# Patient Record
Sex: FEMALE | Race: WHITE | NOT HISPANIC OR LATINO | Employment: OTHER | URBAN - METROPOLITAN AREA
[De-identification: names, ages, dates, MRNs, and addresses within clinical notes are randomized per-mention and may not be internally consistent; named-entity substitution may affect disease eponyms.]

---

## 2023-03-01 PROBLEM — H43.399 VITREOUS FLOATERS: Status: ACTIVE | Noted: 2023-03-01

## 2023-03-01 PROBLEM — M19.049 ARTHRITIS OF HAND: Status: ACTIVE | Noted: 2023-03-01

## 2023-03-01 PROBLEM — I48.92 PAROXYSMAL ATRIAL FLUTTER (MULTI): Status: ACTIVE | Noted: 2023-03-01

## 2023-03-01 PROBLEM — G40.909 EPILEPSY, UNSPECIFIED, NOT INTRACTABLE, WITHOUT STATUS EPILEPTICUS (MULTI): Status: ACTIVE | Noted: 2023-03-01

## 2023-03-01 PROBLEM — M19.90 OSTEOARTHRITIS: Status: ACTIVE | Noted: 2023-03-01

## 2023-03-01 PROBLEM — Z97.3 WEARS EYEGLASSES: Status: ACTIVE | Noted: 2023-03-01

## 2023-03-01 PROBLEM — E78.5 HYPERLIPIDEMIA: Status: ACTIVE | Noted: 2023-03-01

## 2023-03-01 PROBLEM — H52.203 ASTIGMATISM OF BOTH EYES: Status: ACTIVE | Noted: 2023-03-01

## 2023-03-01 PROBLEM — E06.3 HASHIMOTO'S THYROIDITIS: Status: ACTIVE | Noted: 2023-03-01

## 2023-03-01 PROBLEM — H40.1190 POAG (PRIMARY OPEN-ANGLE GLAUCOMA): Status: ACTIVE | Noted: 2023-03-01

## 2023-03-01 PROBLEM — H26.499 PCO (POSTERIOR CAPSULAR OPACIFICATION): Status: ACTIVE | Noted: 2023-03-01

## 2023-03-01 PROBLEM — Z96.1 PRESENCE OF ARTIFICIAL INTRA-OCULAR LENS: Status: ACTIVE | Noted: 2023-03-01

## 2023-03-01 PROBLEM — G47.33 OBSTRUCTIVE SLEEP APNEA OF ADULT: Status: ACTIVE | Noted: 2023-03-01

## 2023-03-01 PROBLEM — G40.409: Status: ACTIVE | Noted: 2023-03-01

## 2023-03-01 PROBLEM — H04.123 DRY EYE SYNDROME OF BOTH LACRIMAL GLANDS: Status: ACTIVE | Noted: 2023-03-01

## 2023-03-01 PROBLEM — I10 HYPERTENSION: Status: ACTIVE | Noted: 2023-03-01

## 2023-03-01 PROBLEM — M81.0 OSTEOPOROSIS: Status: ACTIVE | Noted: 2023-03-01

## 2023-03-01 PROBLEM — F32.0 DEPRESSION, MAJOR, SINGLE EPISODE, MILD (CMS-HCC): Status: ACTIVE | Noted: 2023-03-01

## 2023-03-01 PROBLEM — N39.41 URGE INCONTINENCE OF URINE: Status: ACTIVE | Noted: 2023-03-01

## 2023-03-01 PROBLEM — N32.81 OVERACTIVE BLADDER: Status: ACTIVE | Noted: 2023-03-01

## 2023-03-01 PROBLEM — M10.9 GOUT: Status: ACTIVE | Noted: 2023-03-01

## 2023-03-01 PROBLEM — E03.9 HYPOTHYROIDISM: Status: ACTIVE | Noted: 2023-03-01

## 2023-03-01 RX ORDER — OXYBUTYNIN CHLORIDE 5 MG/1
5 TABLET ORAL DAILY
COMMUNITY
Start: 2022-10-06 | End: 2023-10-04 | Stop reason: SDUPTHER

## 2023-03-01 RX ORDER — ELECTROLYTES/DEXTROSE
SOLUTION, ORAL ORAL
COMMUNITY
End: 2024-03-07 | Stop reason: HOSPADM

## 2023-03-01 RX ORDER — ZONISAMIDE 100 MG/1
CAPSULE ORAL
COMMUNITY
Start: 2016-09-14

## 2023-03-01 RX ORDER — TRAZODONE HYDROCHLORIDE 50 MG/1
TABLET ORAL
COMMUNITY
Start: 2020-08-03 | End: 2023-11-08 | Stop reason: SDUPTHER

## 2023-03-01 RX ORDER — ACETAMINOPHEN, DIPHENHYDRAMINE HCL, PHENYLEPHRINE HCL 325; 25; 5 MG/1; MG/1; MG/1
1 TABLET ORAL NIGHTLY
COMMUNITY
Start: 2018-12-05 | End: 2024-02-19 | Stop reason: ALTCHOICE

## 2023-03-01 RX ORDER — AMLODIPINE BESYLATE 5 MG/1
TABLET ORAL
COMMUNITY
Start: 2013-03-11 | End: 2023-08-01 | Stop reason: SDUPTHER

## 2023-03-01 RX ORDER — ESCITALOPRAM OXALATE 5 MG/1
TABLET ORAL
COMMUNITY
Start: 2020-11-30 | End: 2023-11-08

## 2023-03-01 RX ORDER — ACETAMINOPHEN 500 MG
TABLET ORAL
COMMUNITY
End: 2024-03-07 | Stop reason: HOSPADM

## 2023-03-01 RX ORDER — ASPIRIN 325 MG
TABLET, DELAYED RELEASE (ENTERIC COATED) ORAL
COMMUNITY
Start: 2018-10-08 | End: 2024-03-07 | Stop reason: HOSPADM

## 2023-03-01 RX ORDER — LEVOTHYROXINE SODIUM 112 UG/1
TABLET ORAL
COMMUNITY
Start: 2016-06-01 | End: 2023-08-01 | Stop reason: SDUPTHER

## 2023-03-01 RX ORDER — TRIAMCINOLONE ACETONIDE 1 MG/G
OINTMENT TOPICAL
COMMUNITY
End: 2024-03-07 | Stop reason: HOSPADM

## 2023-03-01 RX ORDER — ASCORBIC ACID 500 MG
TABLET ORAL
COMMUNITY
Start: 2017-11-08 | End: 2024-01-25 | Stop reason: SDUPTHER

## 2023-03-01 RX ORDER — LANOLIN ALCOHOL/MO/W.PET/CERES
CREAM (GRAM) TOPICAL
COMMUNITY
Start: 2016-04-26 | End: 2024-03-07 | Stop reason: HOSPADM

## 2023-03-01 RX ORDER — LAMOTRIGINE 100 MG/1
TABLET ORAL
COMMUNITY
Start: 2022-10-12 | End: 2024-01-29 | Stop reason: SDUPTHER

## 2023-03-01 RX ORDER — LANOLIN ALCOHOL/MO/W.PET/CERES
CREAM (GRAM) TOPICAL
COMMUNITY
End: 2024-02-19 | Stop reason: ALTCHOICE

## 2023-03-01 RX ORDER — CHOLECALCIFEROL (VITAMIN D3) 125 MCG
CAPSULE ORAL
COMMUNITY
Start: 2021-08-13 | End: 2023-09-27 | Stop reason: WASHOUT

## 2023-03-01 RX ORDER — DESOXIMETASONE 0.5 MG/G
GEL TOPICAL
COMMUNITY
Start: 2012-12-29 | End: 2024-02-19 | Stop reason: ALTCHOICE

## 2023-04-25 RX ORDER — METOPROLOL SUCCINATE 25 MG/1
TABLET, EXTENDED RELEASE ORAL
COMMUNITY
End: 2023-04-26 | Stop reason: SDUPTHER

## 2023-04-26 ENCOUNTER — TELEPHONE (OUTPATIENT)
Dept: PRIMARY CARE | Facility: CLINIC | Age: 83
End: 2023-04-26
Payer: MEDICARE

## 2023-04-26 DIAGNOSIS — I10 HYPERTENSION, UNSPECIFIED TYPE: Primary | ICD-10-CM

## 2023-04-26 RX ORDER — METOPROLOL SUCCINATE 25 MG/1
25 TABLET, EXTENDED RELEASE ORAL DAILY
Qty: 30 TABLET | Refills: 3 | Status: SHIPPED | OUTPATIENT
Start: 2023-04-26 | End: 2023-05-08 | Stop reason: SDUPTHER

## 2023-05-08 ENCOUNTER — OFFICE VISIT (OUTPATIENT)
Dept: PRIMARY CARE | Facility: CLINIC | Age: 83
End: 2023-05-08
Payer: MEDICARE

## 2023-05-08 ENCOUNTER — LAB (OUTPATIENT)
Dept: LAB | Facility: LAB | Age: 83
End: 2023-05-08
Payer: MEDICARE

## 2023-05-08 VITALS
WEIGHT: 164 LBS | HEART RATE: 62 BPM | OXYGEN SATURATION: 96 % | BODY MASS INDEX: 28 KG/M2 | HEIGHT: 64 IN | SYSTOLIC BLOOD PRESSURE: 127 MMHG | DIASTOLIC BLOOD PRESSURE: 68 MMHG

## 2023-05-08 DIAGNOSIS — E11.9 NEWLY DIAGNOSED DIABETES (MULTI): ICD-10-CM

## 2023-05-08 DIAGNOSIS — I10 HYPERTENSION, UNSPECIFIED TYPE: Primary | ICD-10-CM

## 2023-05-08 DIAGNOSIS — E03.9 HYPOTHYROIDISM, UNSPECIFIED TYPE: ICD-10-CM

## 2023-05-08 DIAGNOSIS — E78.5 HYPERLIPIDEMIA, UNSPECIFIED HYPERLIPIDEMIA TYPE: ICD-10-CM

## 2023-05-08 DIAGNOSIS — S00.469A TICK BITE OF EAR, UNSPECIFIED LATERALITY, INITIAL ENCOUNTER: ICD-10-CM

## 2023-05-08 DIAGNOSIS — F32.0 DEPRESSION, MAJOR, SINGLE EPISODE, MILD (CMS-HCC): ICD-10-CM

## 2023-05-08 DIAGNOSIS — G47.33 OBSTRUCTIVE SLEEP APNEA OF ADULT: ICD-10-CM

## 2023-05-08 DIAGNOSIS — I10 HYPERTENSION, UNSPECIFIED TYPE: ICD-10-CM

## 2023-05-08 DIAGNOSIS — M19.90 OSTEOARTHRITIS, UNSPECIFIED OSTEOARTHRITIS TYPE, UNSPECIFIED SITE: ICD-10-CM

## 2023-05-08 DIAGNOSIS — M10.9 GOUT, UNSPECIFIED CAUSE, UNSPECIFIED CHRONICITY, UNSPECIFIED SITE: ICD-10-CM

## 2023-05-08 DIAGNOSIS — K59.00 CONSTIPATION, UNSPECIFIED CONSTIPATION TYPE: ICD-10-CM

## 2023-05-08 DIAGNOSIS — G40.909 NONINTRACTABLE EPILEPSY WITHOUT STATUS EPILEPTICUS, UNSPECIFIED EPILEPSY TYPE (MULTI): ICD-10-CM

## 2023-05-08 DIAGNOSIS — Z00.00 HEALTHCARE MAINTENANCE: ICD-10-CM
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
## 2023-05-08 DIAGNOSIS — Z13.6 SCREENING FOR CARDIOVASCULAR CONDITION: ICD-10-CM

## 2023-05-08 DIAGNOSIS — M81.0 OSTEOPOROSIS, UNSPECIFIED OSTEOPOROSIS TYPE, UNSPECIFIED PATHOLOGICAL FRACTURE PRESENCE: ICD-10-CM

## 2023-05-08 DIAGNOSIS — Z13.21 ENCOUNTER FOR VITAMIN DEFICIENCY SCREENING: ICD-10-CM

## 2023-05-08 DIAGNOSIS — R73.03 PREDIABETES: ICD-10-CM

## 2023-05-08 PROBLEM — G47.21 CIRCADIAN RHYTHM SLEEP DISORDER, DELAYED SLEEP PHASE TYPE: Status: ACTIVE | Noted: 2023-05-08

## 2023-05-08 PROBLEM — Z90.12 HISTORY OF LEFT MASTECTOMY: Status: ACTIVE | Noted: 2023-05-08

## 2023-05-08 PROBLEM — F41.9 CHRONIC ANXIETY: Status: ACTIVE | Noted: 2023-05-08

## 2023-05-08 LAB
ALANINE AMINOTRANSFERASE (SGPT) (U/L) IN SER/PLAS: 15 U/L (ref 7–45)
ALBUMIN (G/DL) IN SER/PLAS: 4.5 G/DL (ref 3.4–5)
ALKALINE PHOSPHATASE (U/L) IN SER/PLAS: 83 U/L (ref 33–136)
ANION GAP IN SER/PLAS: 15 MMOL/L (ref 10–20)
ASPARTATE AMINOTRANSFERASE (SGOT) (U/L) IN SER/PLAS: 16 U/L (ref 9–39)
BILIRUBIN TOTAL (MG/DL) IN SER/PLAS: 0.6 MG/DL (ref 0–1.2)
CALCIUM (MG/DL) IN SER/PLAS: 10.3 MG/DL (ref 8.6–10.6)
CARBON DIOXIDE, TOTAL (MMOL/L) IN SER/PLAS: 28 MMOL/L (ref 21–32)
CHLORIDE (MMOL/L) IN SER/PLAS: 106 MMOL/L (ref 98–107)
CHOLESTEROL (MG/DL) IN SER/PLAS: 170 MG/DL (ref 0–199)
CHOLESTEROL IN HDL (MG/DL) IN SER/PLAS: 65.9 MG/DL
CHOLESTEROL/HDL RATIO: 2.6
CREATININE (MG/DL) IN SER/PLAS: 0.77 MG/DL (ref 0.5–1.05)
ERYTHROCYTE DISTRIBUTION WIDTH (RATIO) BY AUTOMATED COUNT: 14.6 % (ref 11.5–14.5)
ERYTHROCYTE MEAN CORPUSCULAR HEMOGLOBIN CONCENTRATION (G/DL) BY AUTOMATED: 31.4 G/DL (ref 32–36)
ERYTHROCYTE MEAN CORPUSCULAR VOLUME (FL) BY AUTOMATED COUNT: 102 FL (ref 80–100)
ERYTHROCYTES (10*6/UL) IN BLOOD BY AUTOMATED COUNT: 4.61 X10E12/L (ref 4–5.2)
ESTIMATED AVERAGE GLUCOSE FOR HBA1C: 123 MG/DL
GFR FEMALE: 77 ML/MIN/1.73M2
GLUCOSE (MG/DL) IN SER/PLAS: 117 MG/DL (ref 74–99)
HEMATOCRIT (%) IN BLOOD BY AUTOMATED COUNT: 46.8 % (ref 36–46)
HEMOGLOBIN (G/DL) IN BLOOD: 14.7 G/DL (ref 12–16)
HEMOGLOBIN A1C/HEMOGLOBIN TOTAL IN BLOOD: 5.9 %
LDL: 89 MG/DL (ref 0–99)
LEUKOCYTES (10*3/UL) IN BLOOD BY AUTOMATED COUNT: 8.9 X10E9/L (ref 4.4–11.3)
NRBC (PER 100 WBCS) BY AUTOMATED COUNT: 0 /100 WBC (ref 0–0)
PLATELETS (10*3/UL) IN BLOOD AUTOMATED COUNT: 544 X10E9/L (ref 150–450)
POTASSIUM (MMOL/L) IN SER/PLAS: 4.7 MMOL/L (ref 3.5–5.3)
PROTEIN TOTAL: 6.9 G/DL (ref 6.4–8.2)
SODIUM (MMOL/L) IN SER/PLAS: 144 MMOL/L (ref 136–145)
THYROTROPIN (MIU/L) IN SER/PLAS BY DETECTION LIMIT <= 0.05 MIU/L: 1.32 MIU/L (ref 0.44–3.98)
TRIGLYCERIDE (MG/DL) IN SER/PLAS: 78 MG/DL (ref 0–149)
URATE (MG/DL) IN SER/PLAS: 4.5 MG/DL (ref 2.3–6.7)
UREA NITROGEN (MG/DL) IN SER/PLAS: 23 MG/DL (ref 6–23)
VLDL: 16 MG/DL (ref 0–40)

## 2023-05-08 PROCEDURE — 80061 LIPID PANEL: CPT

## 2023-05-08 PROCEDURE — 1126F AMNT PAIN NOTED NONE PRSNT: CPT | Performed by: INTERNAL MEDICINE

## 2023-05-08 PROCEDURE — 36415 COLL VENOUS BLD VENIPUNCTURE: CPT

## 2023-05-08 PROCEDURE — 83036 HEMOGLOBIN GLYCOSYLATED A1C: CPT

## 2023-05-08 PROCEDURE — 85027 COMPLETE CBC AUTOMATED: CPT

## 2023-05-08 PROCEDURE — 3074F SYST BP LT 130 MM HG: CPT | Performed by: INTERNAL MEDICINE

## 2023-05-08 PROCEDURE — 1159F MED LIST DOCD IN RCRD: CPT | Performed by: INTERNAL MEDICINE

## 2023-05-08 PROCEDURE — 99417 PROLNG OP E/M EACH 15 MIN: CPT | Performed by: INTERNAL MEDICINE

## 2023-05-08 PROCEDURE — 1160F RVW MEDS BY RX/DR IN RCRD: CPT | Performed by: INTERNAL MEDICINE

## 2023-05-08 PROCEDURE — 99215 OFFICE O/P EST HI 40 MIN: CPT | Performed by: INTERNAL MEDICINE

## 2023-05-08 PROCEDURE — 82652 VIT D 1 25-DIHYDROXY: CPT

## 2023-05-08 PROCEDURE — 84550 ASSAY OF BLOOD/URIC ACID: CPT

## 2023-05-08 PROCEDURE — 84443 ASSAY THYROID STIM HORMONE: CPT

## 2023-05-08 PROCEDURE — 3078F DIAST BP <80 MM HG: CPT | Performed by: INTERNAL MEDICINE

## 2023-05-08 PROCEDURE — 80053 COMPREHEN METABOLIC PANEL: CPT

## 2023-05-08 RX ORDER — DENOSUMAB 60 MG/ML
60 INJECTION SUBCUTANEOUS ONCE
Qty: 1 ML | Refills: 0
Start: 2023-05-08 | End: 2024-03-22

## 2023-05-08 RX ORDER — DOXYCYCLINE 100 MG/1
CAPSULE ORAL
Qty: 20 CAPSULE | Refills: 0 | Status: SHIPPED | OUTPATIENT
Start: 2023-05-08 | End: 2023-11-07 | Stop reason: ALTCHOICE

## 2023-05-08 RX ORDER — METOPROLOL SUCCINATE 25 MG/1
25 TABLET, EXTENDED RELEASE ORAL DAILY
Qty: 90 TABLET | Refills: 1 | Status: SHIPPED | OUTPATIENT
Start: 2023-05-08 | End: 2024-01-06

## 2023-05-08 ASSESSMENT — ENCOUNTER SYMPTOMS
EYES NEGATIVE: 1
GASTROINTESTINAL NEGATIVE: 1
NEUROLOGICAL NEGATIVE: 1
PSYCHIATRIC NEGATIVE: 1
CARDIOVASCULAR NEGATIVE: 1
MUSCULOSKELETAL NEGATIVE: 1
DEPRESSION: 0
HEMATOLOGIC/LYMPHATIC NEGATIVE: 1
ENDOCRINE NEGATIVE: 1
RESPIRATORY NEGATIVE: 1
CONSTITUTIONAL NEGATIVE: 1

## 2023-05-08 ASSESSMENT — PATIENT HEALTH QUESTIONNAIRE - PHQ9
SUM OF ALL RESPONSES TO PHQ9 QUESTIONS 1 AND 2: 0
2. FEELING DOWN, DEPRESSED OR HOPELESS: NOT AT ALL
1. LITTLE INTEREST OR PLEASURE IN DOING THINGS: NOT AT ALL

## 2023-05-08 NOTE — PROGRESS NOTES
Subjective   Patient ID: Flower Lucas is a 82 y.o. female who presents for Follow-up (Pt here for fuv, pt complains of tick bite on left ear x 1 week and constipation issues ).  HPI    Co  constipation.    Takes magnesium  and calcium.   No stool softener.     Recent tick bite  ear.  5  d   thought it  was a scab.  Now  feels something a little  tender.           Refill meds.   Metorpolol  25 mg.       Get lab.       Review of Systems   Constitutional: Negative.    HENT: Negative.     Eyes: Negative.    Respiratory: Negative.     Cardiovascular: Negative.    Gastrointestinal: Negative.    Endocrine: Negative.    Musculoskeletal: Negative.    Skin: Negative.    Neurological: Negative.    Hematological: Negative.    Psychiatric/Behavioral: Negative.     All other systems reviewed and are negative.      Objective   Physical Exam  Vitals and nursing note reviewed.   Constitutional:       Appearance: Normal appearance.   HENT:      Head: Normocephalic and atraumatic.      Right Ear: Tympanic membrane, ear canal and external ear normal.      Left Ear: Tympanic membrane, ear canal and external ear normal.      Nose: Nose normal.      Mouth/Throat:      Mouth: Mucous membranes are moist.      Pharynx: Oropharynx is clear.   Eyes:      Extraocular Movements: Extraocular movements intact.      Conjunctiva/sclera: Conjunctivae normal.      Pupils: Pupils are equal, round, and reactive to light.   Cardiovascular:      Rate and Rhythm: Normal rate and regular rhythm.      Pulses: Normal pulses.      Heart sounds: Normal heart sounds.   Pulmonary:      Effort: Pulmonary effort is normal.      Breath sounds: Normal breath sounds.   Abdominal:      General: Abdomen is flat. Bowel sounds are normal.      Palpations: Abdomen is soft.   Musculoskeletal:         General: Normal range of motion.      Cervical back: Neck supple.   Skin:     General: Skin is warm and dry.      Capillary Refill: Capillary refill takes 2 to 3 seconds.    Neurological:      General: No focal deficit present.      Mental Status: She is alert and oriented to person, place, and time. Mental status is at baseline.   Psychiatric:         Mood and Affect: Mood normal.         Behavior: Behavior normal.         Thought Content: Thought content normal.         Judgment: Judgment normal.         Assessment/Plan   Problem List Items Addressed This Visit          Medium    Depression, major, single episode, mild (CMS/HCC)    Epilepsy, unspecified, not intractable, without status epilepticus (CMS/HCC)    Gout    Relevant Orders    Uric Acid (Completed)    Hyperlipidemia    Relevant Orders    Comprehensive Metabolic Panel (Completed)    Lipid Panel (Completed)    CBC (Completed)    Vitamin D 1,25 Dihydroxy (Completed)    TSH with reflex to Free T4 if abnormal (Completed)    Uric Acid (Completed)    Hemoglobin A1c (Completed)    Hypertension - Primary    Relevant Medications    metoprolol succinate XL (Toprol-XL) 25 mg 24 hr tablet    Other Relevant Orders    Comprehensive Metabolic Panel (Completed)    CBC (Completed)    TSH with reflex to Free T4 if abnormal (Completed)    Hypothyroidism    Relevant Orders    TSH with reflex to Free T4 if abnormal (Completed)    Newly diagnosed diabetes (CMS/[facility])    Obstructive sleep apnea of adult    Osteoarthritis    Osteoporosis    Relevant Orders    Comprehensive Metabolic Panel (Completed)    CBC (Completed)    Vitamin D 1,25 Dihydroxy (Completed)    TSH with reflex to Free T4 if abnormal (Completed)    Prediabetes    Relevant Orders    Hemoglobin A1c (Completed)    Tick bite of ear    Relevant Medications    doxycycline (Vibramycin) 100 mg capsule    Other Relevant Orders    Comprehensive Metabolic Panel (Completed)    TSH with reflex to Free T4 if abnormal (Completed)     Other Visit Diagnoses       Constipation, unspecified constipation type        Healthcare maintenance        Relevant Orders    Comprehensive Metabolic Panel  (Completed)    Lipid Panel (Completed)    CBC (Completed)    Vitamin D 1,25 Dihydroxy (Completed)    TSH with reflex to Free T4 if abnormal (Completed)    Uric Acid (Completed)    Hemoglobin A1c (Completed)    Screening for cardiovascular condition        Relevant Orders    Lipid Panel (Completed)    Encounter for vitamin deficiency screening        Relevant Orders    Vitamin D 1,25 Dihydroxy (Completed)          CHART  REVIEWED AND  RECONCILED WITH OLD DATA / UPDATED IN NEW SYSTEM:  MEDS,  PROBLEMS,  ALLERGIES, SOC HISTORY.  Chronic problems controlled  on current treatment  unless otherwise noted.

## 2023-05-10 ENCOUNTER — TELEPHONE (OUTPATIENT)
Dept: PRIMARY CARE | Facility: CLINIC | Age: 83
End: 2023-05-10
Payer: MEDICARE

## 2023-05-10 LAB — VITAMIN D 1,25-DIHYDROXY: 47.1 PG/ML (ref 19.9–79.3)

## 2023-05-10 NOTE — TELEPHONE ENCOUNTER
----- Message from Bhavna Costa MD sent at 5/10/2023  1:16 PM EDT -----  Please call the patient regarding her abnormal result.

## 2023-06-14 DIAGNOSIS — I10 HYPERTENSION, UNSPECIFIED TYPE: Primary | ICD-10-CM

## 2023-06-14 RX ORDER — HYDROCHLOROTHIAZIDE 12.5 MG/1
12.5 TABLET ORAL DAILY
Qty: 90 TABLET | Refills: 1 | Status: SHIPPED | OUTPATIENT
Start: 2023-06-14 | End: 2023-12-12 | Stop reason: SDUPTHER

## 2023-06-14 RX ORDER — HYDROCHLOROTHIAZIDE 12.5 MG/1
12.5 TABLET ORAL DAILY
COMMUNITY
End: 2023-06-14 | Stop reason: SDUPTHER

## 2023-07-19 ENCOUNTER — HOSPITAL ENCOUNTER (OUTPATIENT)
Dept: DATA CONVERSION | Facility: HOSPITAL | Age: 83
End: 2023-07-20
Attending: ORTHOPAEDIC SURGERY | Admitting: ORTHOPAEDIC SURGERY
Payer: MEDICARE

## 2023-07-19 DIAGNOSIS — I10 ESSENTIAL (PRIMARY) HYPERTENSION: ICD-10-CM

## 2023-07-19 DIAGNOSIS — E78.5 HYPERLIPIDEMIA, UNSPECIFIED: ICD-10-CM

## 2023-07-19 DIAGNOSIS — F41.9 ANXIETY DISORDER, UNSPECIFIED: ICD-10-CM

## 2023-07-19 DIAGNOSIS — E03.9 HYPOTHYROIDISM, UNSPECIFIED: ICD-10-CM

## 2023-07-19 DIAGNOSIS — M25.752 OSTEOPHYTE, LEFT HIP: ICD-10-CM

## 2023-07-19 DIAGNOSIS — M65.88 OTHER SYNOVITIS AND TENOSYNOVITIS, OTHER SITE: ICD-10-CM

## 2023-07-19 DIAGNOSIS — H40.9 UNSPECIFIED GLAUCOMA: ICD-10-CM

## 2023-07-19 DIAGNOSIS — F32.A DEPRESSION, UNSPECIFIED: ICD-10-CM

## 2023-07-19 DIAGNOSIS — G40.919 EPILEPSY, UNSPECIFIED, INTRACTABLE, WITHOUT STATUS EPILEPTICUS (MULTI): ICD-10-CM

## 2023-07-19 DIAGNOSIS — M85.88 OTHER SPECIFIED DISORDERS OF BONE DENSITY AND STRUCTURE, OTHER SITE: ICD-10-CM

## 2023-07-19 DIAGNOSIS — Z87.891 PERSONAL HISTORY OF NICOTINE DEPENDENCE: ICD-10-CM

## 2023-07-19 DIAGNOSIS — M16.12 UNILATERAL PRIMARY OSTEOARTHRITIS, LEFT HIP: ICD-10-CM

## 2023-07-19 LAB
ALANINE AMINOTRANSFERASE (SGPT) (U/L) IN SER/PLAS: 23 U/L (ref 7–45)
ALBUMIN (G/DL) IN SER/PLAS: 3.9 G/DL (ref 3.4–5)
ALKALINE PHOSPHATASE (U/L) IN SER/PLAS: 63 U/L (ref 33–136)
ANION GAP IN SER/PLAS: 12 MMOL/L (ref 10–20)
ASPARTATE AMINOTRANSFERASE (SGOT) (U/L) IN SER/PLAS: 28 U/L (ref 9–39)
BILIRUBIN TOTAL (MG/DL) IN SER/PLAS: 0.4 MG/DL (ref 0–1.2)
CALCIUM (MG/DL) IN SER/PLAS: 8.4 MG/DL (ref 8.6–10.3)
CARBON DIOXIDE, TOTAL (MMOL/L) IN SER/PLAS: 26 MMOL/L (ref 21–32)
CHLORIDE (MMOL/L) IN SER/PLAS: 105 MMOL/L (ref 98–107)
CREATININE (MG/DL) IN SER/PLAS: 0.78 MG/DL (ref 0.5–1.05)
ERYTHROCYTE DISTRIBUTION WIDTH (RATIO) BY AUTOMATED COUNT: 14.8 % (ref 11.5–14.5)
ERYTHROCYTE MEAN CORPUSCULAR HEMOGLOBIN CONCENTRATION (G/DL) BY AUTOMATED: 31.5 G/DL (ref 32–36)
ERYTHROCYTE MEAN CORPUSCULAR VOLUME (FL) BY AUTOMATED COUNT: 102 FL (ref 80–100)
ERYTHROCYTES (10*6/UL) IN BLOOD BY AUTOMATED COUNT: 3.69 X10E12/L (ref 4–5.2)
GFR FEMALE: 75 ML/MIN/1.73M2
GLUCOSE (MG/DL) IN SER/PLAS: 141 MG/DL (ref 74–99)
HEMATOCRIT (%) IN BLOOD BY AUTOMATED COUNT: 37.8 % (ref 36–46)
HEMOGLOBIN (G/DL) IN BLOOD: 11.9 G/DL (ref 12–16)
LACTATE (MMOL/L) IN SER/PLAS: 1.7 MMOL/L (ref 0.4–2)
LAMOTRIGINE LEVEL: NORMAL
LEUKOCYTES (10*3/UL) IN BLOOD BY AUTOMATED COUNT: 15.1 X10E9/L (ref 4.4–11.3)
MAGNESIUM (MG/DL) IN SER/PLAS: 1.85 MG/DL (ref 1.6–2.4)
PHOSPHATE (MG/DL) IN SER/PLAS: 3.3 MG/DL (ref 2.5–4.9)
PLATELETS (10*3/UL) IN BLOOD AUTOMATED COUNT: 367 X10E9/L (ref 150–450)
POCT GLUCOSE: 148 MG/DL (ref 74–99)
POTASSIUM (MMOL/L) IN SER/PLAS: 3.9 MMOL/L (ref 3.5–5.3)
PROTEIN TOTAL: 5.8 G/DL (ref 6.4–8.2)
SODIUM (MMOL/L) IN SER/PLAS: 139 MMOL/L (ref 136–145)
UREA NITROGEN (MG/DL) IN SER/PLAS: 22 MG/DL (ref 6–23)
ZONISAMIDE: NORMAL

## 2023-07-20 LAB
ANION GAP IN SER/PLAS: 12 MMOL/L (ref 10–20)
BASOPHILS (10*3/UL) IN BLOOD BY AUTOMATED COUNT: 0.03 X10E9/L (ref 0–0.1)
BASOPHILS/100 LEUKOCYTES IN BLOOD BY AUTOMATED COUNT: 0.3 % (ref 0–2)
CALCIUM (MG/DL) IN SER/PLAS: 8.6 MG/DL (ref 8.6–10.3)
CARBON DIOXIDE, TOTAL (MMOL/L) IN SER/PLAS: 24 MMOL/L (ref 21–32)
CHLORIDE (MMOL/L) IN SER/PLAS: 107 MMOL/L (ref 98–107)
CREATININE (MG/DL) IN SER/PLAS: 0.72 MG/DL (ref 0.5–1.05)
EOSINOPHILS (10*3/UL) IN BLOOD BY AUTOMATED COUNT: 0.11 X10E9/L (ref 0–0.4)
EOSINOPHILS/100 LEUKOCYTES IN BLOOD BY AUTOMATED COUNT: 1.1 % (ref 0–6)
ERYTHROCYTE DISTRIBUTION WIDTH (RATIO) BY AUTOMATED COUNT: 14.6 % (ref 11.5–14.5)
ERYTHROCYTE MEAN CORPUSCULAR HEMOGLOBIN CONCENTRATION (G/DL) BY AUTOMATED: 30.7 G/DL (ref 32–36)
ERYTHROCYTE MEAN CORPUSCULAR VOLUME (FL) BY AUTOMATED COUNT: 104 FL (ref 80–100)
ERYTHROCYTES (10*6/UL) IN BLOOD BY AUTOMATED COUNT: 3.62 X10E12/L (ref 4–5.2)
GFR FEMALE: 83 ML/MIN/1.73M2
GLUCOSE (MG/DL) IN SER/PLAS: 121 MG/DL (ref 74–99)
HEMATOCRIT (%) IN BLOOD BY AUTOMATED COUNT: 37.8 % (ref 36–46)
HEMOGLOBIN (G/DL) IN BLOOD: 11.6 G/DL (ref 12–16)
IMMATURE GRANULOCYTES/100 LEUKOCYTES IN BLOOD BY AUTOMATED COUNT: 0.7 % (ref 0–0.9)
LAMOTRIGINE LEVEL: 3.5 UG/ML (ref 2.5–15)
LEUKOCYTES (10*3/UL) IN BLOOD BY AUTOMATED COUNT: 10.2 X10E9/L (ref 4.4–11.3)
LYMPHOCYTES (10*3/UL) IN BLOOD BY AUTOMATED COUNT: 0.84 X10E9/L (ref 0.8–3)
LYMPHOCYTES/100 LEUKOCYTES IN BLOOD BY AUTOMATED COUNT: 8.3 % (ref 13–44)
MONOCYTES (10*3/UL) IN BLOOD BY AUTOMATED COUNT: 1.07 X10E9/L (ref 0.05–0.8)
MONOCYTES/100 LEUKOCYTES IN BLOOD BY AUTOMATED COUNT: 10.5 % (ref 2–10)
NEUTROPHILS (10*3/UL) IN BLOOD BY AUTOMATED COUNT: 8.04 X10E9/L (ref 1.6–5.5)
NEUTROPHILS/100 LEUKOCYTES IN BLOOD BY AUTOMATED COUNT: 79.1 % (ref 40–80)
PLATELETS (10*3/UL) IN BLOOD AUTOMATED COUNT: 352 X10E9/L (ref 150–450)
POTASSIUM (MMOL/L) IN SER/PLAS: 3.7 MMOL/L (ref 3.5–5.3)
SODIUM (MMOL/L) IN SER/PLAS: 139 MMOL/L (ref 136–145)
UREA NITROGEN (MG/DL) IN SER/PLAS: 17 MG/DL (ref 6–23)

## 2023-07-24 LAB — ZONISAMIDE: 7.6 MCG/ML (ref 10–40)

## 2023-07-24 RX ORDER — CARISOPRODOL 350 MG/1
TABLET ORAL
COMMUNITY
Start: 2023-07-19 | End: 2024-01-25 | Stop reason: WASHOUT

## 2023-07-24 RX ORDER — CELECOXIB 200 MG/1
CAPSULE ORAL
COMMUNITY
Start: 2023-07-19 | End: 2024-01-25 | Stop reason: WASHOUT

## 2023-07-24 RX ORDER — GABAPENTIN 300 MG/1
CAPSULE ORAL
COMMUNITY
Start: 2023-07-19 | End: 2024-02-19 | Stop reason: ALTCHOICE

## 2023-07-24 RX ORDER — OXYCODONE AND ACETAMINOPHEN 5; 325 MG/1; MG/1
TABLET ORAL
COMMUNITY
Start: 2023-07-19 | End: 2023-09-27 | Stop reason: WASHOUT

## 2023-08-01 DIAGNOSIS — I10 HYPERTENSION, UNSPECIFIED TYPE: Primary | ICD-10-CM

## 2023-08-01 DIAGNOSIS — E03.9 HYPOTHYROIDISM, UNSPECIFIED TYPE: ICD-10-CM

## 2023-08-01 RX ORDER — AMLODIPINE BESYLATE 5 MG/1
5 TABLET ORAL DAILY
Qty: 90 TABLET | Refills: 1 | Status: SHIPPED | OUTPATIENT
Start: 2023-08-01 | End: 2024-02-27 | Stop reason: SDUPTHER

## 2023-08-01 RX ORDER — LEVOTHYROXINE SODIUM 112 UG/1
112 TABLET ORAL DAILY
Qty: 90 TABLET | Refills: 1 | Status: SHIPPED | OUTPATIENT
Start: 2023-08-01 | End: 2024-02-12 | Stop reason: SDUPTHER

## 2023-08-16 LAB
CREATININE (MG/DL) IN SER/PLAS: 0.82 MG/DL (ref 0.5–1.05)
GFR FEMALE: 71 ML/MIN/1.73M2
POC CALCIUM IONIZED (MMOL/L) IN BLOOD: 1.29 MMOL/L (ref 1.1–1.33)

## 2023-08-24 PROBLEM — Z96.651 HISTORY OF TOTAL RIGHT KNEE REPLACEMENT: Status: ACTIVE | Noted: 2023-08-24

## 2023-08-24 PROBLEM — M70.71 BURSITIS OF RIGHT HIP: Status: ACTIVE | Noted: 2023-08-24

## 2023-08-24 PROBLEM — G40.209 COMPLEX PARTIAL SEIZURES WITH CONSCIOUSNESS IMPAIRED (MULTI): Status: ACTIVE | Noted: 2023-08-24

## 2023-08-24 PROBLEM — G45.9 TRANSIENT ISCHEMIC ATTACK: Status: ACTIVE | Noted: 2023-08-24

## 2023-08-24 PROBLEM — M25.559 PAIN IN JOINT INVOLVING PELVIC REGION AND THIGH: Status: ACTIVE | Noted: 2023-08-24

## 2023-08-24 PROBLEM — M19.072 OSTEOARTHRITIS OF LEFT FOOT: Status: ACTIVE | Noted: 2023-08-24

## 2023-08-24 PROBLEM — N39.0 ACUTE LOWER URINARY TRACT INFECTION: Status: ACTIVE | Noted: 2023-08-24

## 2023-08-24 PROBLEM — R41.82 ALTERED MENTAL STATUS: Status: ACTIVE | Noted: 2023-08-24

## 2023-08-24 RX ORDER — NAPROXEN 500 MG/1
500 TABLET ORAL
COMMUNITY
End: 2023-09-27 | Stop reason: WASHOUT

## 2023-08-24 RX ORDER — ASCORBIC ACID 500 MG
500 TABLET ORAL EVERY 24 HOURS
COMMUNITY
End: 2024-03-07 | Stop reason: HOSPADM

## 2023-09-05 ENCOUNTER — DOCUMENTATION (OUTPATIENT)
Dept: PRIMARY CARE | Facility: CLINIC | Age: 83
End: 2023-09-05
Payer: MEDICARE

## 2023-09-27 ENCOUNTER — OFFICE VISIT (OUTPATIENT)
Dept: PRIMARY CARE | Facility: CLINIC | Age: 83
End: 2023-09-27
Payer: MEDICARE

## 2023-09-27 VITALS
HEIGHT: 63 IN | HEART RATE: 75 BPM | WEIGHT: 165 LBS | SYSTOLIC BLOOD PRESSURE: 144 MMHG | OXYGEN SATURATION: 97 % | DIASTOLIC BLOOD PRESSURE: 76 MMHG | TEMPERATURE: 98.3 F | BODY MASS INDEX: 29.23 KG/M2

## 2023-09-27 DIAGNOSIS — N39.0 URINARY TRACT INFECTION WITHOUT HEMATURIA, SITE UNSPECIFIED: ICD-10-CM

## 2023-09-27 DIAGNOSIS — G40.909 NONINTRACTABLE EPILEPSY WITHOUT STATUS EPILEPTICUS, UNSPECIFIED EPILEPSY TYPE (MULTI): Primary | ICD-10-CM

## 2023-09-27 DIAGNOSIS — I24.89 DEMAND ISCHEMIA (MULTI): ICD-10-CM

## 2023-09-27 PROCEDURE — 99214 OFFICE O/P EST MOD 30 MIN: CPT | Performed by: INTERNAL MEDICINE

## 2023-09-27 PROCEDURE — 3077F SYST BP >= 140 MM HG: CPT | Performed by: INTERNAL MEDICINE

## 2023-09-27 PROCEDURE — 1159F MED LIST DOCD IN RCRD: CPT | Performed by: INTERNAL MEDICINE

## 2023-09-27 PROCEDURE — 90662 IIV NO PRSV INCREASED AG IM: CPT | Performed by: INTERNAL MEDICINE

## 2023-09-27 PROCEDURE — 1160F RVW MEDS BY RX/DR IN RCRD: CPT | Performed by: INTERNAL MEDICINE

## 2023-09-27 PROCEDURE — 3078F DIAST BP <80 MM HG: CPT | Performed by: INTERNAL MEDICINE

## 2023-09-27 PROCEDURE — 1126F AMNT PAIN NOTED NONE PRSNT: CPT | Performed by: INTERNAL MEDICINE

## 2023-09-27 PROCEDURE — G0008 ADMIN INFLUENZA VIRUS VAC: HCPCS | Performed by: INTERNAL MEDICINE

## 2023-09-27 RX ORDER — RESPIRATORY SYNCYTIAL VISUS VACCINE RECOMBINANT, ADJUVANTED 120MCG/0.5
0.5 KIT INTRAMUSCULAR ONCE
Qty: 0.5 ML | Refills: 0 | Status: SHIPPED | OUTPATIENT
Start: 2023-09-27 | End: 2023-09-27

## 2023-09-27 ASSESSMENT — LIFESTYLE VARIABLES
HOW OFTEN DO YOU HAVE SIX OR MORE DRINKS ON ONE OCCASION: NEVER
HOW MANY STANDARD DRINKS CONTAINING ALCOHOL DO YOU HAVE ON A TYPICAL DAY: PATIENT DOES NOT DRINK
AUDIT-C TOTAL SCORE: 0
SKIP TO QUESTIONS 9-10: 1
HOW OFTEN DO YOU HAVE A DRINK CONTAINING ALCOHOL: NEVER

## 2023-09-27 ASSESSMENT — PATIENT HEALTH QUESTIONNAIRE - PHQ9
2. FEELING DOWN, DEPRESSED OR HOPELESS: NOT AT ALL
SUM OF ALL RESPONSES TO PHQ9 QUESTIONS 1 AND 2: 0
1. LITTLE INTEREST OR PLEASURE IN DOING THINGS: NOT AT ALL

## 2023-09-27 ASSESSMENT — COLUMBIA-SUICIDE SEVERITY RATING SCALE - C-SSRS: 1. IN THE PAST MONTH, HAVE YOU WISHED YOU WERE DEAD OR WISHED YOU COULD GO TO SLEEP AND NOT WAKE UP?: NO

## 2023-09-27 NOTE — PROGRESS NOTES
"Subjective   Patient ID: Flower Lucas is a 82 y.o. female who presents for Follow-up ([facility] ) and Seizures.    HPI     Review of Systems    Objective   /76 (BP Location: Right arm, Patient Position: Sitting, BP Cuff Size: Adult)   Pulse 75   Temp 36.8 °C (98.3 °F)   Ht 1.6 m (5' 3\")   Wt 74.8 kg (165 lb)   SpO2 97%   BMI 29.23 kg/m²     Physical Exam    Assessment/Plan          "

## 2023-09-27 NOTE — PROGRESS NOTES
"Subjective   Patient ID: Flower Lucas is a 82 y.o. female who presents for Follow-up ([facility] ) and Seizures.    HPI patient presents to clinic after her discharge from [facility] [place].  She was admitted for seizure disorder, urinary tract infection, elevated troponin secondary to demand ischemia .during hospitalization she had carotid ultrasound done which came back negative for any critical stenosis, MRI of the brain was unremarkable and 2D echocardiogram done revealed EF of 60 to 65% without any evidence of ventricular thrombus or any pericardial effusion.  Please see discharge summary for detailed information.  She also underwent left hip arthroplasty secondary to oa on 7/19/23 at [facility] without any major complications.  She seems to doing fairly well and denies any chest pain, cough congestion headache dysuria nausea vomiting flank pain and seizure activity.  She has past history of epilepsy, gouty arthritis, hyperlipidemia, hypertension, hypothyroidism, osteoporosis, prediabetes osteoarthritis and obstructive sleep apnea.    Review of Systems   Constitutional: Negative.    HENT: Negative.     Eyes: Negative.    Respiratory: Negative.     Cardiovascular: Negative.    Gastrointestinal: Negative.    Endocrine: Negative.    Genitourinary: Negative.    Musculoskeletal: Negative.    Skin: Negative.    Allergic/Immunologic: Negative.    Neurological: Negative.    Hematological: Negative.    Psychiatric/Behavioral: Negative.         Objective   /76 (BP Location: Right arm, Patient Position: Sitting, BP Cuff Size: Adult)   Pulse 75   Temp 36.8 °C (98.3 °F)   Ht 1.6 m (5' 3\")   Wt 74.8 kg (165 lb)   SpO2 97%   BMI 29.23 kg/m²     Physical Exam  Constitutional:       Appearance: Normal appearance. She is normal weight.   HENT:      Right Ear: Tympanic membrane normal.      Left Ear: Tympanic membrane and ear canal normal.      Nose: Nose normal.   Neck:      Vascular: No carotid bruit. "   Cardiovascular:      Rate and Rhythm: Normal rate.   Pulmonary:      Effort: No respiratory distress.      Breath sounds: No stridor. No wheezing.   Abdominal:      Palpations: Abdomen is soft.      Tenderness: There is no guarding or rebound.   Skin:     Coloration: Skin is not jaundiced.   Neurological:      General: No focal deficit present.      Mental Status: She is alert and oriented to person, place, and time.      Comments:     Psychiatric:         Mood and Affect: Mood normal.         Assessment/Plan    patient is given reassurance.  She will be given influenza vaccine for health maintenance.  She is also given prescription for RSV vaccination.  She is encouraged to follow-up with cardiology and neurology clinic regarding elevated troponin and seizure disorder.  She will continue other medication and will follow-up with Dr. Lundberg to her scheduled appointment.

## 2023-09-28 ENCOUNTER — HOSPITAL ENCOUNTER (OUTPATIENT)
Dept: DATA CONVERSION | Facility: HOSPITAL | Age: 83
Discharge: HOME | End: 2023-09-28
Payer: MEDICARE

## 2023-09-29 VITALS
HEART RATE: 66 BPM | SYSTOLIC BLOOD PRESSURE: 142 MMHG | HEIGHT: 65 IN | WEIGHT: 165.34 LBS | BODY MASS INDEX: 27.55 KG/M2 | RESPIRATION RATE: 18 BRPM | DIASTOLIC BLOOD PRESSURE: 80 MMHG | TEMPERATURE: 97.9 F

## 2023-09-29 DIAGNOSIS — Z96.642 STATUS POST TOTAL HIP REPLACEMENT, LEFT: Primary | ICD-10-CM

## 2023-09-30 ASSESSMENT — ENCOUNTER SYMPTOMS
RESPIRATORY NEGATIVE: 1
EYES NEGATIVE: 1
NEUROLOGICAL NEGATIVE: 1
CARDIOVASCULAR NEGATIVE: 1
PSYCHIATRIC NEGATIVE: 1
ALLERGIC/IMMUNOLOGIC NEGATIVE: 1
ENDOCRINE NEGATIVE: 1
GASTROINTESTINAL NEGATIVE: 1
CONSTITUTIONAL NEGATIVE: 1
HEMATOLOGIC/LYMPHATIC NEGATIVE: 1
MUSCULOSKELETAL NEGATIVE: 1

## 2023-09-30 NOTE — DISCHARGE SUMMARY
Send Summary:   Discharge Summary Providers:  Provider Role Provider Name   · Attending BANDAR RAMIREZ   · Referring BANDAR RAMIREZ   · Consulting James Olvera   · Primary Bhavna Costa       Note Recipients: Bhavna Costa MD - [phone] [Preferred]  BANDAR RAMIREZ DO       Discharge:    Summary:   Admission Date: .19-Jul-2023 06:07:00   Discharge Date: 20-Jul-2023   Admission Reason: Post op Left DAA total hip arthroplasty   Final Discharge Diagnoses: Post op Left DAA total  hip arthroplasty   Procedures: Left DAA ISMA   Vital Signs:          Date:            Weight/Scale Type:  Height:   19-Jul-2023 06:50  75  kg         165.1  cm  Physical Exam:    Bandages were appreciated, clean and dry, 5/5 dorsi/plantar flexion. Neurovascularly intact bilaterally. Calves are supple and soft bilaterally, negative homans.  Hospital Course:    The patient is a pleasant 82-year-old female who underwent an elective Left direct anterior approach total hip arthroplasty performed by Dr. Ramirez at [facility] on 7/19/2023.  Patient had a routine uncomplicated preoperative, intraoperative and immediate postoperative surgical course.  As planned postoperatively the patient was admitted to the regular nursing floor at [facility].  While in the  regular nursing floor patient received consultations from both internal medicine as well as physical therapy.  Patient received preoperative and postoperative intravenous antibiotics.  Pain control is with a combination of both oral and IV narcotic and  nonnarcotic medications.  DVT prophylaxis was with sequentials early ambulation and Aspirin therapy.  Anticoagulation medications will be continued for minimum of 30 days postsurgery.  Patient was discharged home with home physical therapy and home health  care outpatient follow-up is being arranged for 2 weeks in the outpatient clinic postdischarge.          Discharge Information:    and Continuing Care:   Lab Results -  Pending:    None  Radiology Results - Pending: None   Plan of Care Reviewed With: patient   Discharge Instructions:    Activity:           activity as tolerated.          May shower..            May not drive.            Weight-bearing Instructions: weight-bearing as tolerated left leg.            Total Hip Precautions Anterior approach: FOR 2 WEEKS: NO straight leg raises, NO backward kicks and NO bending over to put your shoes/socks or reach the floor. No crossing your legs or ankles.  Use ice frequently day and night for 2 weeks.   Continue to wear compression stockings everyday. May take stockings off at night to sleep but reapply each morning for the next 2 weeks until seen by your surgeon at your follow up appt.    Nutrition/Diet:           resume normal diet    Wound Care:           Wound Site:   Left Total Hip Replacement          Wound Type:   surgical incision          Cover With:   Mepilex          Instructions:   no lotions, creams, or tub soaks          Other Instructions:   Do not remove Mepilex AG dressing from the hip  until follow up visit in 2 weeks with doctor. If dressing becomes saturated and starts leaking  notify your surgeon.   Call the office if having increased redness, pain, swelling,  drainage at incision or if you have fever and chills.  May shower. pat dressing dry, no soap, no lotions and  no soaking.    Home Care Certification:           Home Care Agency:    [facility] [phone]          Skilled Disciplines Ordered:   PT    Home Care Services:           Home Care Skilled Service:   Rehab (PT/OT/SP eval and treat)    Follow Up Appointments:    FU in 2 weeks at [facility].  Discharge Medications: See med rec.   Issues to Discuss at Follow-up / Goals for Continuing Care:    FU in 2 weeks at [facility].    DNR Status:   ·  Code Status Code Status order at time of discharge: Full Code     Attestation:   Note Completion:  I am a:  Advanced Practice Provider   Attending Only - Shared  Visit with Advanced Practice Provider This is a shared visit.  I have reviewed the Advanced Practice Provider?s encounter note, approve the Advanced Practice Provider?s documentation,  and provide the following additional information from my personal encounter.   Comments/ Additional Findings    ACK        Electronic Signatures:  Feliciano Vargas (PAC)  (Signed 19-Jul-2023 07:23)   Authored: Send Summary, Summary Content, Ongoing Care,  DNR Status, Note Completion  BANDAR LUGO (DO)  (Signed 19-Jul-2023 10:27)   Authored: Summary Content   Co-Signer: Send Summary, Summary Content, Ongoing Care, DNR Status, Note Completion      Last Updated: 19-Jul-2023 10:27 by BANDAR LUGO (DO)

## 2023-09-30 NOTE — PROGRESS NOTES
Service: Medicine     Subjective Data:   CARYN WEIR is a 82 year old Female who is Hospital Day # 2 and POD #1 for Left direct anterior approach total hip arthroplasty.     Patient was walking with Son and PT. She is endorsing some pain on ambulation but reports doing well overall. Has not had a bowel movement since surgery.    Overnight Events: Acute events in the past 24 hours  include   Additional Information:    Yesterday, patient had a witnessed absence seizure while ambulating to the bathroom. On interview, she notes having these seizures for approximately 40 years with  one witnessed grand mal seizure 30 years ago. Son was in room to help provide collateral. They believe her last witnessed seizure was 3 months ago although she lives alone. Son and patient believe that her seizures are precipitated by lack of sleep, pain,  and dehydration. She follows with Dr. Smalls a neurologist at . She takes Lamictal and ziconasamide both 100 mg BID.     Objective Data:     Objective Information:      T   P  R  BP   MAP  SpO2   Value  36.2  67  18  138/64   81  98%  Date/Time 7/20 4:35 7/20 4:35 7/20 4:35 7/20 4:35  7/19 23:40 7/20 4:35  Range  (36.1C - 36.4C )  (67 - 88 )  (18 - 18 )  (120 - 138 )/ (56 - 75 )  (81 - 86 )  (91% - 98% )   As of 19-Jul-2023 15:45:00, patient is on 2 L/min of oxygen via room air.      Pain reported at 7/20 9:51: 7 = Severe    ---- Intake and Output  -----  Mn/Dy/Year Time  Intake   Output  Net  Jul 20, 2023 6:00 am  0   0  0  Jul 19, 2023 10:00 pm  1180   0  1180  Jul 19, 2023 2:00 pm  1600   850  750    The Intake and Output Totals for the last 24 hours are:      Intake   Output  Net      2780   850  1930        Pain reported at 7/20 9:51: 7 = Severe    Physical Exam by System:    Constitutional: Well developed, awake/alert/oriented  x3, no distress, alert and cooperative   Respiratory/Thorax: Patent airways, CTAB, normal  breath sounds with good chest expansion, thorax symmetric    Cardiovascular: Regular, rate and rhythm, no murmurs,  2+ equal pulses of the extremities, normal S 1and S 2   Gastrointestinal: Nondistended, soft, non-tender,  no rebound tenderness or guarding, no masses palpable, no organomegaly, +BS, no bruits   Musculoskeletal: + swelling and tenderness in the  left hip. good muscular strength on ambulation with walker.   Extremities: normal extremities, no cyanosis edema,  contusions or wounds, no clubbing    +2 neurovascularly intake in the upper and lower extremities.   Neurological: alert and oriented x3, intact senses,  motor, response and reflexes, normal strength   Psychological: Appropriate mood and behavior   Skin: Warm and dry, no lesions, no rashes     Medication:    Medications:          Continuous Medications       --------------------------------    1. Lactated Ringers Infusion:  1000  mL  IntraVenous  <Continuous>         Scheduled Medications       --------------------------------    1. Acetaminophen:  975  mg  Oral  Every 6 Hours    2. amLODIPine (NORVASC):  5  mg  Oral  Daily    3. Aspirin Enteric Coated:  325  mg  Oral  2 Times a Day    4. Docusate:  100  mg  Oral  2 Times a Day    5. hydroCHLOROthiazide:  12.5  mg  Oral  Daily    6. lamoTRIgine (LAMICTAL):  100  mg  Oral  2 Times a Day    7. Levothyroxine:  112  microgram(s)  Oral  Daily    8. Metoprolol Succinate Extended Release:  25  mg  Oral  Daily    9. Oxybutynin  - PEDS:  5  mg  Oral  Every Night    10. Polyethylene Glycol:  17  gram(s)  Oral  2 Times a Day    11. Zonisamide:  100  mg  Oral  2 Times a Day         PRN Medications       --------------------------------    1. diphenhydrAMINE:  25  mg  Oral  Every 6 Hours    2. LORazepam Injectable:  2  mg  IntraVenous Push  Once    3. Magnesium Hydroxide -Al Hydrox -Simethicone Oral Liquid:  30  mL  Oral  Every 6 Hours    4. Metoclopramide Injectable:  5  mg  IntraVenous Push  Every 6 Hours    5. Morphine Injectable:  2  mg  IntraVenous Push  Every 4  Hours    6. Ondansetron Injectable:  4  mg  IntraVenous Push  Every 6 Hours    7. oxyCODONE Immediate Release:  5  mg  Oral  Every 6 Hours    8. oxyCODONE Immediate Release:  10  mg  Oral  Every 6 Hours    9. Sore Throat Lozenge:  1  lozenge(s)  Oral  Every 4 Hours    10. traMADol:  50  mg  Oral  Every 6 Hours    11. traZODone:  50  mg  Oral  At Bedtime        Recent Lab Results:    Results:    CBC: 7/20/2023 05:33              \     Hgb     /                              \     11.6 L    /  WBC  ----------------  Plt               10.2       ----------------    352              /     Hct     \                              /     37.8       \            RBC: 3.62 L    MCV: 104 H    Neutrophil  %: 79.1      BMP: 7/20/2023 05:33  NA+        Cl-     BUN  /                         139    107    17  /  --------------------------------  Glucose                ---------------------------  121 H    K+     HCO3-   Creat \                         3.7  24    0.72  \  Calcium : 8.6     Anion Gap : 12      CMP: 7/19/2023 17:04  NA+        Cl-     BUN  /                         139    105    22  /  --------------------------------  Glucose                ---------------------------  141 H    K+     HCO3-   Creat \                         3.9    26    0.78  \           \  T Bili  /                    \  0.4  /  AST  x ---- x ALT        28 x ---- x 23         /  Alk P   \               /  63  \  Calcium : 8.4 L    Anion Gap : 12     Albumin : 3.9     T Protein : 5.8 L           Radiology Results:    Results:        Impression:     Left total hip arthroplasty in anatomic alignment on this frontal  view.        Signed by Reyes Guallpa MD     Xray Hip 2 View [Jul 19 2023 12:06PM]      Impression:  Xray Hip 2 View [Jul 19 2023 11:20AM]      Assessment and Plan:        Admitting Dx:   Status post total replacement of hip: Entered Date: 19-Jul-2023  07:04       Additional Dx:   Breakthrough seizure: Entered Date:  19-Jul-2023 20:12    Code Status:  ·  Code Status Full Code     Advance Care Planning:  Advance Care Planning: I evaluated the patient  and determined the patient's capacity to understand the risks, benefits and alternatives to treatment. I elicited the patient's goals for treatment and reviewed advance directives and medical orders for life sustaining treatment. The patient was given  an opportunity to review a blank advance directive as appropriate.     Assessment:    Assessment:    81yo CF with PMH of epilepsy, hypothyroidism, depression with anxiety, HTN, HLD, glaucoma, and OA that presented for left hip surgery. We are being consulted for  a witnessed absence seizure.     Left hip osteoarthritis  - POD#7 left direct approach total hip arthroplasty  - continue multimodal pain regimen  - PT/OT skilled for low intensity PT  - mgmt per primary ortho team    Possible breakthrough seizure  -lamotrigine level 3.5 in therapeutic window  -ziconasmide levels are still pending   -continue lamictal 100 mg BID and ziconasmide 100 mg BID  -Ativan 2 mg IV for seizure break-through   -follow up with Dr. Smalls on discharge  -continue adequate hydration, proper pain management, and adequate rest for seizure prevention     Hypothyroidism  - continue home levothyroxine    Hypertension  - continue home amlodipine 5mg and hydrochlorothiazide 12.5 mg and metoprolol 25 mg daily     DVT Proph: SCDs and aspiring 325 mg BID    Dispo: Patient require observation in setting of possible breakthrough seizure, discharge per primary team.       Attestation:   Note Completion:  I am a:  Resident/Fellow   Attending Attestation I saw and evaluated the patient.  I personally obtained the key and critical portions of the history and physical exam or was physically present for key and  critical portions performed by the resident/fellow. I reviewed the resident/fellow?s documentation and discussed the patient with the resident/fellow.  I agree with  the resident/fellow?s medical decision making as documented in the note.     I personally evaluated the patient on 20-Jul-2023   Comments/ Additional Findings    I saw and evaluated the patient. I personally obtained the key and critical portions of the history and exam. I reviewed the resident's documentation  and agree with my notations made within body of note. Patient appears medically stable, would not adjust seizure medications at this time but did recommend patient follow up with PCP and neurology within next week for likely daphnie-operative breakthrough  seizure.          Electronic Signatures:  Lizzie Ohara ()  (Signed 20-Jul-2023 14:02)   Authored: Note Completion   Co-Signer: Service, Subjective Data, Objective Data, Assessment and Plan, Note Completion  Romel Aburto ( (Resident))  (Signed 20-Jul-2023 10:43)   Authored: Service, Subjective Data, Objective Data, Assessment  and Plan, Note Completion      Last Updated: 20-Jul-2023 14:02 by Lizzie Ohara ()

## 2023-09-30 NOTE — H&P
History of Present Illness:   History Present Illness:  Reason for surgery: Left hip osteoarthritis   HPI:    82 year old female that was evaluated and failed conservative care and opted to move forward with a left DAA ISMA.    Allergies:        Allergies:  ·  No Known Allergies :     Home Medication Review:   Home Medications Reviewed: yes     Impression/Procedure:   ·  Impression and Planned Procedure: Left hip OA, plan for Left DAA ISMA       ERAS (Enhanced Recovery After Surgery):  ·  ERAS Patient: no     Review of Systems:   Review of Systems:  All Other Systems: All other systems reviewed and  are negative       Vital Signs:  Temperature C: 36.6 degrees C   Temperature F: 97.8 degrees F   Heart Rate: 66 beats per minute   Respiratory Rate: 18 breath per minute   Blood Pressure Systolic: 142 mm/Hg   Blood Pressure Diastolic: 80 mm/Hg     Physical Exam Narrative:  ·  Physical Exam:    82 year old female in no apparent distress, A&Ox3, No JVD appreciated, trachea mid-line, respirations non labored, HR regular.     Physical Exam by System:    Respiratory/Thorax: Patent airways, CTAB, normal  breath sounds with good chest expansion, thorax symmetric   Cardiovascular: Regular, rate and rhythm, no murmurs,  2+ equal pulses of the extremities, normal S 1and S 2     Consent:   COVID-19 Consent:  ·  COVID-19 Risk Consent Surgeon has reviewed key risks related to the risk of zainab COVID-19 and if they contract COVID-19 what the risks are.     Attestation:   Note Completion:  I am a:  Advanced Practice Provider   Attending Only - Shared Visit with Advanced Practice Provider This is a shared visit.  I have reviewed the Advanced Practice Provider?s encounter note, approve the Advanced Practice Provider?s documentation,  and provide the following additional information from my personal encounter.   Comments/ Additional Findings    ACK        Electronic Signatures:  Feliciano Vargas (PAC)   (Signed 19-Jul-2023  07:44)   Authored: History of Present Illness, Allergies, Home Medication Review, Impression/Procedure, Review of Systems, Physical Exam, ERAS, Consent, Note Completion  BANDAR LUGO ()   (Signed 19-Jul-2023 10:28)   Co-Signer: History of Present Illness, Allergies, Home Medication Review, Impression/Procedure, Review of Systems, Physical Exam, ERAS, Consent, Note Completion    Last Updated: 19-Jul-2023 10:28 by BANDAR LUGO ()

## 2023-10-02 ENCOUNTER — TREATMENT (OUTPATIENT)
Dept: PHYSICAL THERAPY | Facility: CLINIC | Age: 83
End: 2023-10-02
Payer: MEDICARE

## 2023-10-02 DIAGNOSIS — Z96.642 STATUS POST TOTAL HIP REPLACEMENT, LEFT: Primary | ICD-10-CM

## 2023-10-02 DIAGNOSIS — M25.552 LEFT HIP PAIN: ICD-10-CM

## 2023-10-02 PROCEDURE — 97110 THERAPEUTIC EXERCISES: CPT | Mod: GP

## 2023-10-02 ASSESSMENT — PAIN - FUNCTIONAL ASSESSMENT: PAIN_FUNCTIONAL_ASSESSMENT: 0-10

## 2023-10-02 NOTE — PROGRESS NOTES
"Physical Therapy    Physical Therapy Treatment    Patient Name: Flower Lucas  MRN: [id number]  Today's Date: 10/2/2023  Time Calculation  Start Time: 1130  Stop Time: 1210  Time Calculation (min): 40 min      Assessment:   Visit focused on LE strengthening, conditioning and gait mobility. Able to perform all activities without onset pain or difficulty.    Plan:  OP PT Plan  Treatment/Interventions: Therapeutic activities, Therapeutic exercises, Neuromuscular re-education, Manual therapy  PT Plan: Skilled PT  PT Frequency: 2 times per week  Duration: 8 weeks  Certification Period Start Date: 08/24/23  Certification Period End Date: 11/21/23  Number of Treatments Authorized: 11    Current Problem  1. Status post total hip replacement, left  Follow Up In Physical Therapy      2. Left hip pain            Subjective   General   Reports she is doing very well. No issues with L ISMA. No device for household distances and infrequently uses for community distances. Has been able to ambulate [place] 2 hours [place] [employer]. Still needs to follow step to gait pattern negotiating stairs.     Precautions  Precautions  Precautions Comment: ISMA precautions (anterior approach)     Pain  Pain Assessment: 0-10  Pain: 0    Objective   Independent with ambulation with reciprocal gait and no device     Treatments:  NuStep, Level 2 x 10 min with UE  Forward step up, 6\" x 15 R/L with 1 UE  Step up/over 6\" x 5 R/L with 1 UE  Lateral step up, 6\" x 15 R/L with 2 UE  Biceps stretch for R shoulder discomfort    OP EDUCATION:   Activity as tolerated    Goals:  Pt will demonstrate sit to stand transfer without UE x 10 to demonstrate improved LE strength  Pt will ambulate with least restrictive device to demonstrate safe community ambulation  Pt will demonstrate compliance surgical precautions to promote optimal surgical outcomes and healing  Pt will demonstrate TUG </= 10 seconds to least restrictive device to decrease fall risk  Pt will perform " reciprocal stair negotiation with SA support to demonstrate improved LE strength  Pt will be independent with HEP to promote self management

## 2023-10-02 NOTE — OP NOTE
Post Operative Note:     PreOp Diagnosis: Left hip osteoarthritis   Post-Procedure Diagnosis: Left hip osteoarthritis   Procedure: Left direct anterior approach total hip  arthroplasty   Surgeon: James   Resident/Fellow/Other Assistant: Feliciano Vargas PA-C   Anesthesia: Spinal /MAC   I.V. Fluids: See anesthesia record   Estimated Blood Loss (mL): 350cc   Blood Replacement: None   Specimen: no   Complications: None   Findings: See operative report   Patient Returned To/Condition: Stable to PACU   Urine Output: See anesthesia record   Drains and/or Catheters: None   Implants: Chitra     Operative Report Dictated:  Dictation: yes   Date of Dictation: 19-Jul-2023     Attestation:   Note Completion:  Attending Attestation I performed the procedure without a resident         Electronic Signatures:  BANDAR LUGO ()  (Signed 19-Jul-2023 10:31)   Authored: Post Operative Note, Note Completion      Last Updated: 19-Jul-2023 10:31 by BANDAR LUGO ()

## 2023-10-03 ENCOUNTER — OFFICE VISIT (OUTPATIENT)
Dept: CARDIOLOGY | Facility: CLINIC | Age: 83
End: 2023-10-03
Payer: MEDICARE

## 2023-10-03 ENCOUNTER — OFFICE VISIT (OUTPATIENT)
Dept: RHEUMATOLOGY | Facility: CLINIC | Age: 83
End: 2023-10-03
Payer: MEDICARE

## 2023-10-03 VITALS
HEART RATE: 59 BPM | SYSTOLIC BLOOD PRESSURE: 130 MMHG | OXYGEN SATURATION: 96 % | BODY MASS INDEX: 28.87 KG/M2 | WEIGHT: 163 LBS | DIASTOLIC BLOOD PRESSURE: 62 MMHG

## 2023-10-03 VITALS — SYSTOLIC BLOOD PRESSURE: 128 MMHG | BODY MASS INDEX: 28.52 KG/M2 | WEIGHT: 161 LBS | DIASTOLIC BLOOD PRESSURE: 70 MMHG

## 2023-10-03 DIAGNOSIS — I10 PRIMARY HYPERTENSION: Primary | ICD-10-CM

## 2023-10-03 DIAGNOSIS — M81.0 OSTEOPOROSIS, UNSPECIFIED OSTEOPOROSIS TYPE, UNSPECIFIED PATHOLOGICAL FRACTURE PRESENCE: Primary | ICD-10-CM

## 2023-10-03 PROCEDURE — 3075F SYST BP GE 130 - 139MM HG: CPT | Performed by: INTERNAL MEDICINE

## 2023-10-03 PROCEDURE — 3074F SYST BP LT 130 MM HG: CPT | Performed by: INTERNAL MEDICINE

## 2023-10-03 PROCEDURE — 99213 OFFICE O/P EST LOW 20 MIN: CPT | Performed by: INTERNAL MEDICINE

## 2023-10-03 PROCEDURE — 1160F RVW MEDS BY RX/DR IN RCRD: CPT | Performed by: INTERNAL MEDICINE

## 2023-10-03 PROCEDURE — 3078F DIAST BP <80 MM HG: CPT | Performed by: INTERNAL MEDICINE

## 2023-10-03 PROCEDURE — 1159F MED LIST DOCD IN RCRD: CPT | Performed by: INTERNAL MEDICINE

## 2023-10-03 PROCEDURE — 1126F AMNT PAIN NOTED NONE PRSNT: CPT | Performed by: INTERNAL MEDICINE

## 2023-10-03 PROCEDURE — 1036F TOBACCO NON-USER: CPT | Performed by: INTERNAL MEDICINE

## 2023-10-03 ASSESSMENT — PAIN SCALES - GENERAL: PAINLEVEL: 0-NO PAIN

## 2023-10-03 ASSESSMENT — ENCOUNTER SYMPTOMS
OCCASIONAL FEELINGS OF UNSTEADINESS: 0
LOSS OF SENSATION IN FEET: 0
DEPRESSION: 0

## 2023-10-03 ASSESSMENT — PATIENT HEALTH QUESTIONNAIRE - PHQ9
2. FEELING DOWN, DEPRESSED OR HOPELESS: NOT AT ALL
1. LITTLE INTEREST OR PLEASURE IN DOING THINGS: NOT AT ALL
SUM OF ALL RESPONSES TO PHQ9 QUESTIONS 1 AND 2: 0

## 2023-10-03 NOTE — PATIENT INSTRUCTIONS
We discussed her mild troponin elevation during her recent hospitalization for her seizure disorder and her situation is consistent with a transient type II troponin elevation not an actual heart attack per se.  No recurrent seizure she had with a likely cause of her troponin elevation, and her echocardiogram showed normal LV systolic function without segmental wall motion abnormality.  Those findings are consistent type II troponin elevation.    She can continue her daily activities and her workout sessions and she should do things in moderation not excessively.  She can follow-up with me in 1 years time.

## 2023-10-03 NOTE — ASSESSMENT & PLAN NOTE
Blood pressure is well controlled on her present medications she will continue her daily physical activity

## 2023-10-03 NOTE — PROGRESS NOTES
Subjective      Chief Complaint   Patient presents with    Hospital Follow-up        82-year-old woman who was seen at [facility] in September 2023 in the setting of multiple seizure facilities and chronic seizure disorder and she had recently missed some of her seizure medication.  She had a neurology consultation to manage her seizures.  We were consulted because she had troponin elevations of 321, 252, 384 without angina and no ischemic EKG changes, consistent with a type II troponin elevation from her seizure disorder.  She had an unremarkable physical exam.    Her echocardiogram September 15, 2023 showed normal LV systolic function with left ventricular ejection fraction 60 to 65% and no significant structural heart disease.    Since her hospital discharge she has not had any recurrent seizures.  She has a normal functional capacity with no history of previous myocardial infarction, heart failure, valvular heart disease, or sustained arrhythmia.         Review of Systems   All other systems reviewed and are negative.       Objective   Physical Exam  Constitutional:       Appearance: Normal appearance.   HENT:      Head: Normocephalic and atraumatic.   Eyes:      Pupils: Pupils are equal, round, and reactive to light.   Cardiovascular:      Rate and Rhythm: Normal rate and regular rhythm.      Pulses: Normal pulses.      Heart sounds: Normal heart sounds.   Pulmonary:      Effort: Pulmonary effort is normal.      Breath sounds: Normal breath sounds.   Abdominal:      General: Abdomen is flat. Bowel sounds are normal.      Palpations: Abdomen is soft.   Musculoskeletal:         General: Normal range of motion.      Cervical back: Normal range of motion.   Skin:     General: Skin is warm and dry.   Neurological:      General: No focal deficit present.   Psychiatric:         Mood and Affect: Mood normal.         Judgment: Judgment normal.          Lab Review:   not applicable  Not  applicable    Hypertension  Blood pressure is well controlled on her present medications she will continue her daily physical activity
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
## 2023-10-04 DIAGNOSIS — N32.81 OAB (OVERACTIVE BLADDER): Primary | ICD-10-CM

## 2023-10-04 RX ORDER — OXYBUTYNIN CHLORIDE 5 MG/1
5 TABLET ORAL DAILY
Qty: 90 TABLET | Refills: 1 | Status: SHIPPED | OUTPATIENT
Start: 2023-10-04 | End: 2024-04-01 | Stop reason: SDUPTHER

## 2023-10-05 ENCOUNTER — TREATMENT (OUTPATIENT)
Dept: PHYSICAL THERAPY | Facility: CLINIC | Age: 83
End: 2023-10-05
Payer: MEDICARE

## 2023-10-05 DIAGNOSIS — M25.552 LEFT HIP PAIN: Primary | ICD-10-CM

## 2023-10-05 DIAGNOSIS — Z96.642 STATUS POST TOTAL HIP REPLACEMENT, LEFT: ICD-10-CM

## 2023-10-05 PROCEDURE — 97116 GAIT TRAINING THERAPY: CPT | Mod: GP

## 2023-10-05 PROCEDURE — 97110 THERAPEUTIC EXERCISES: CPT | Mod: GP
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
## 2023-10-09 ENCOUNTER — TREATMENT (OUTPATIENT)
Dept: PHYSICAL THERAPY | Facility: CLINIC | Age: 83
End: 2023-10-09
Payer: MEDICARE

## 2023-10-09 DIAGNOSIS — Z96.642 STATUS POST TOTAL HIP REPLACEMENT, LEFT: ICD-10-CM

## 2023-10-09 DIAGNOSIS — M25.552 LEFT HIP PAIN: ICD-10-CM

## 2023-10-09 PROCEDURE — 97110 THERAPEUTIC EXERCISES: CPT | Mod: GP
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
## 2023-10-12 ENCOUNTER — TREATMENT (OUTPATIENT)
Dept: PHYSICAL THERAPY | Facility: CLINIC | Age: 83
End: 2023-10-12
Payer: MEDICARE

## 2023-10-12 DIAGNOSIS — M25.552 LEFT HIP PAIN: Primary | ICD-10-CM

## 2023-10-12 DIAGNOSIS — Z96.642 STATUS POST TOTAL HIP REPLACEMENT, LEFT: ICD-10-CM

## 2023-10-12 PROCEDURE — 97110 THERAPEUTIC EXERCISES: CPT | Mod: GP,CQ

## 2023-10-12 NOTE — PROGRESS NOTES
"Physical Therapy    Physical Therapy Treatment    Patient Name: Flower Lucas  MRN: [id number]  Today's Date: 10/12/2023    Time Calculation  Start Time: 1015  Stop Time: 1100  Time Calculation (min): 45 min    Visit #: 9 out of 11 authorized visits  Evaluation date: 8/21/23    Current Problem:   1. Left hip pain        2. Status post total hip replacement, left  Follow Up In Physical Therapy          ASSESSMENT:   Continues to progress well toward goals, gaining confidence and stability in ambulation on soft surfaces(grass) without device .No increase in L knee pain with interventions.        PLAN:   Progressing towards all goals   OP PT PLAN:  Education/Instruction , Gait training , Self care/home management , Therapeutic activities , and Therapeutic exercise    Skilled PT   2 times per week   Duration: 8 weeks  Certification Period Start Date: 8/24/23  Certification Period End Date: 11/21/23  Visits Approved: 11  Good  Patient       SUBJECTIVE:   Pt able to walk in backyard on grass without can, felt a little unsteady but denies near falls \"I did pretty good\"       Precautions: Falls risk     Pain: Current pain level: 0/10    OBJECTIVE: refer to goal assessment     Treatments:  Therapeutic Exercise: (46 minutes)  LAQ 2# 15 x 2 L/R  Sit to stand from chair (no UE) x15  6inch step up fwd with opp leg hike R x15, L x15 (SA support)  AIREX balance beam side steps x6 (no arm support)  Foam modified tandem balance x5 ( no arm support)  Walking marching for 20ft x6  Bridge + adduction ball 2x15   Hooklying hip adduction stretch 15 sec x 4 L     OP Education:   Continue with straight cane PRN for community and outdoor distances     HEP:  No updates made this visit    Goals:   SHORT TERM GOALS  Pt will demonstrate sit to stand transfer without UE x 10 to demonstrate improved LE strength; --ACHIEVED  Pt will amb with least restrictive device to demonstrate safe community ambulation; PROGRESSING  Pt will demonstrate " compliance with surgical precautions to promote optimal surgical outcomes and healing; ACHIEVED  LONG TERM GOALS  Pt will demonstrate TUG </= 10 seconds with least restrictive device to decrease falls risk; --ACHIEVED  Pt will perform reciprocal stair negotiation with SA support to demonstrate improved LE strength; PARTIAL MET (secondary to L knee bothering her will do step to pattern descending)  Pt will be independent with HEP to promote self management; ACHIEVED

## 2023-10-19 ENCOUNTER — APPOINTMENT (OUTPATIENT)
Dept: PHYSICAL THERAPY | Facility: CLINIC | Age: 83
End: 2023-10-19
Payer: MEDICARE

## 2023-10-23 ENCOUNTER — ANCILLARY PROCEDURE (OUTPATIENT)
Dept: RADIOLOGY | Facility: CLINIC | Age: 83
End: 2023-10-23
Payer: MEDICARE

## 2023-10-23 DIAGNOSIS — M81.0 OSTEOPOROSIS, UNSPECIFIED OSTEOPOROSIS TYPE, UNSPECIFIED PATHOLOGICAL FRACTURE PRESENCE: ICD-10-CM

## 2023-10-23 PROCEDURE — 77080 DXA BONE DENSITY AXIAL: CPT | Performed by: RADIOLOGY

## 2023-10-23 PROCEDURE — 77080 DXA BONE DENSITY AXIAL: CPT

## 2023-10-26 ENCOUNTER — APPOINTMENT (OUTPATIENT)
Dept: RADIOLOGY | Facility: CLINIC | Age: 83
End: 2023-10-26
Payer: MEDICARE

## 2023-10-26 ENCOUNTER — APPOINTMENT (OUTPATIENT)
Dept: PHYSICAL THERAPY | Facility: CLINIC | Age: 83
End: 2023-10-26
Payer: MEDICARE

## 2023-10-26 ENCOUNTER — TREATMENT (OUTPATIENT)
Dept: PHYSICAL THERAPY | Facility: CLINIC | Age: 83
End: 2023-10-26
Payer: MEDICARE

## 2023-10-26 DIAGNOSIS — M25.552 LEFT HIP PAIN: ICD-10-CM

## 2023-10-26 PROCEDURE — 97110 THERAPEUTIC EXERCISES: CPT | Mod: GP

## 2023-10-26 NOTE — PROGRESS NOTES
"Physical Therapy    Physical Therapy Treatment    Patient Name: Flower Lucas  MRN: [id number]  Today's Date: 10/26/2023    Time Calculation  Start Time: 1037  Stop Time: 1117  Time Calculation (min): 40 min    Visit #: 10 out of 11 authorized visits  Evaluation date: 8/21/23    Current Problem:   1. Left hip pain  Follow Up In Physical Therapy          ASSESSMENT:   Pt has made good progress through POC achieving all short term goals and 2/3 long term goals. Pt remains limited by L knee pain impacting functional mobility. Interventions to continue to promote improved strength and balance. Pt continues to require rest breaks throughout tx session.  Updated I HEP to promote independence. Pt plans to return for one more appointment next week.      PLAN:   Progressing towards all goals   OP PT PLAN:  Education/Instruction , Gait training , Self care/home management , Therapeutic activities , and Therapeutic exercise    Skilled PT   2 times per week   Duration: 8 weeks  Certification Period Start Date: 8/24/23  Certification Period End Date: 11/21/23  Visits Approved: 11  Good  Patient       SUBJECTIVE:   Pt reports that she was sick last week, and had a busy week this week. Pt had her birthday yesterday and went out to celebrate. Hip feeling good, L knee is just bothering her. Pt goes to see Dr. Ramirez next for follow up on hip and plans to ask about knee. Balance is iffy sometimes but overall can walk in stores as she wants.      Precautions: Falls risk     Pain: Current pain level: 0/10    OBJECTIVE: refer to goal assessment   Pt ambulates with mild L trendelenburg     Treatments:  Therapeutic Exercise: (40 minutes)  Sit to stand from mat table x15  4\" lateral step up x15 (DA/SA support at bar)   4\" fwd step up x15 ea.  AIREX lateral steps x5 (intermittent UE support as needed)   Marching 2x20  Standing hip ABD (cues for technique to improve gait mechanics) 2x10 ea.   LAQ 2# AW 2x15 ea.      OP Education:   Continue " with straight cane PRN for community and outdoor distances     HEP:  Access Code: [email or web address]  URL: [email or web address]  Date: 10/26/2023  Prepared by: Lori    Exercises  - Supine Bridge  - 1 x daily - 4 x weekly - 2 sets - 10 reps  - Seated Long Arc Quad  - 1 x daily - 4 x weekly - 2 sets - 10 reps  - Standing Hip Abduction with Counter Support  - 1 x daily - 4 x weekly - 2 sets - 10 reps  - Standing Marching  - 1 x daily - 4 x weekly - 2 sets - 10 reps  - Heel Raises with Counter Support  - 1 x daily - 4 x weekly - 2 sets - 10 reps  - Sit to Stand  - 1 x daily - 4 x weekly - 2 sets - 10 reps  - Mini Squat with Counter Support  - 1 x daily - 4 x weekly - 2 sets - 10 reps    Goals:   SHORT TERM GOALS  Pt will demonstrate sit to stand transfer without UE x 10 to demonstrate improved LE strength; --ACHIEVED  Pt will amb with least restrictive device to demonstrate safe community ambulation; ACHEIVED  Pt will demonstrate compliance with surgical precautions to promote optimal surgical outcomes and healing; ACHIEVED  LONG TERM GOALS  Pt will demonstrate TUG </= 10 seconds with least restrictive device to decrease falls risk; --ACHIEVED  Pt will perform reciprocal stair negotiation with SA support to demonstrate improved LE strength; PARTIAL MET (secondary to L knee bothering her will do step to pattern descending)  Pt will be independent with HEP to promote self management; ACHIEVED

## 2023-11-02 ENCOUNTER — TREATMENT (OUTPATIENT)
Dept: PHYSICAL THERAPY | Facility: CLINIC | Age: 83
End: 2023-11-02
Payer: MEDICARE

## 2023-11-02 DIAGNOSIS — M25.552 LEFT HIP PAIN: Primary | ICD-10-CM

## 2023-11-02 DIAGNOSIS — Z96.642 STATUS POST TOTAL HIP REPLACEMENT, LEFT: ICD-10-CM

## 2023-11-02 PROCEDURE — 97110 THERAPEUTIC EXERCISES: CPT | Mod: GP
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
## 2023-11-07 NOTE — PROGRESS NOTES
"6  mo fu  Subjective   Patient ID: Flower Lucas is a 83 y.o. female who presents for Follow-up (6 mo fuv ).  HPI  Discuss meds  Wants to resume  trazodone for  sleep  due to increase  stress due to breast  cancer.   She would like to  resume  anxiety meds   like  lexapro which she was on prev for  stress.     Tkr  will l be in January.  Using   sleeve for   pain.         Below is the patient's most recent value for Albumin, ALT, AST, BUN, Calcium, Chloride, Cholesterol, CO2, Creatinine, GFR, Glucose, HDL, Hematocrit, Hemoglobin, Hemoglobin A1C, LDL, Magnesium, Phosphorus, Platelets, Potassium, PSA, Sodium, Triglycerides, and WBC.   Lab Results   Component Value Date    ALBUMIN 4.4 09/14/2023    ALT 14 09/14/2023    AST 32 09/14/2023    BUN 22 09/19/2023    CALCIUM 9.2 09/19/2023     09/19/2023    CHOL 170 05/08/2023    CO2 23 (L) 09/19/2023    CREATININE 0.8 09/19/2023    HDL 65.9 05/08/2023    HCT 41.3 09/19/2023    HGB 13.5 09/19/2023    HGBA1C 5.9 (A) 05/08/2023    MG 1.85 07/19/2023    PHOS 3.3 07/19/2023     09/19/2023    K 3.8 09/19/2023     09/19/2023    TRIG 78 05/08/2023    WBC 6.7 09/19/2023     Note: for a comprehensive list of the patient's lab results, access the Results Review activity.  Review of Systems   Constitutional: Negative.    Psychiatric/Behavioral:  Positive for agitation, dysphoric mood and sleep disturbance.    All other systems reviewed and are negative.      Objective   BP Readings from Last 3 Encounters:   11/08/23 135/79   10/03/23 130/62   10/03/23 128/70      Wt Readings from Last 3 Encounters:   11/08/23 73 kg (161 lb)   10/03/23 73.9 kg (163 lb)   10/03/23 73 kg (161 lb)      BMI: Estimated body mass index is 28.52 kg/m² as calculated from the following:    Height as of this encounter: 1.6 m (5' 3\").    Weight as of this encounter: 73 kg (161 lb).    BSA: Estimated body surface area is 1.8 meters squared as calculated from the following:    Height as of this " "encounter: 1.6 m (5' 3\").    Weight as of this encounter: 73 kg (161 lb).  Physical Exam  Vitals and nursing note reviewed.   Constitutional:       Appearance: Normal appearance.   HENT:      Head: Normocephalic and atraumatic.      Nose: Nose normal.   Eyes:      Conjunctiva/sclera: Conjunctivae normal.   Cardiovascular:      Rate and Rhythm: Normal rate and regular rhythm.   Pulmonary:      Effort: Pulmonary effort is normal.      Breath sounds: Normal breath sounds.   Abdominal:      General: Abdomen is flat.   Skin:     General: Skin is dry.   Neurological:      General: No focal deficit present.      Mental Status: She is alert and oriented to person, place, and time. Mental status is at baseline.   Psychiatric:         Mood and Affect: Mood normal.         Behavior: Behavior normal.           Assessment/Plan   Problem List Items Addressed This Visit             ICD-10-CM       Medium    Hashimoto's thyroiditis E06.3    History of left mastectomy Z90.12    Hyperlipidemia E78.5    Hypertension I10    Hypothyroidism E03.9    Malignant neoplasm of female breast, unspecified estrogen receptor status, unspecified laterality, unspecified site of breast (CMS/[facility]) C50.919    Newly diagnosed diabetes (CMS/[facility]) E11.9    Osteoporosis M81.0    Paroxysmal atrial flutter (CMS/[facility]) I48.92     Other Visit Diagnoses         Codes    Controlled type 2 diabetes mellitus without complication, without long-term current use of insulin (CMS/[facility])    -  Primary E11.9    controlled by last  a1c     Relevant Orders    Hemoglobin A1C    Albumin , Urine Random    Other specified menopausal and perimenopausal disorders     N95.8    Screening for osteoporosis     Z13.820    Primary insomnia     F51.01    Relevant Medications    traZODone (Desyrel) 50 mg tablet    PTSD (post-traumatic stress disorder)     F43.10    Relevant Medications    escitalopram (Lexapro) 10 mg tablet    Other Relevant Orders    Referral to [facility]    Anxiety disorder, unspecified type     F41.9    Relevant Orders    Referral to [facility]          Chronic problems controlled  on current treatment  unless otherwise noted.     CHART  REVIEWED AND  RECONCILED WITH OLD DATA / UPDATED IN NEW SYSTEM:  MEDS,  PROBLEMS,  ALLERGIES, SOC HISTORY.

## 2023-11-08 ENCOUNTER — LAB (OUTPATIENT)
Dept: LAB | Facility: LAB | Age: 83
End: 2023-11-08
Payer: MEDICARE

## 2023-11-08 ENCOUNTER — OFFICE VISIT (OUTPATIENT)
Dept: PRIMARY CARE | Facility: CLINIC | Age: 83
End: 2023-11-08
Payer: MEDICARE

## 2023-11-08 VITALS
BODY MASS INDEX: 28.53 KG/M2 | SYSTOLIC BLOOD PRESSURE: 135 MMHG | HEIGHT: 63 IN | OXYGEN SATURATION: 94 % | WEIGHT: 161 LBS | HEART RATE: 66 BPM | DIASTOLIC BLOOD PRESSURE: 79 MMHG

## 2023-11-08 DIAGNOSIS — Z90.12 HISTORY OF LEFT MASTECTOMY: ICD-10-CM

## 2023-11-08 DIAGNOSIS — E03.9 HYPOTHYROIDISM, UNSPECIFIED TYPE: ICD-10-CM

## 2023-11-08 DIAGNOSIS — E11.9 NEWLY DIAGNOSED DIABETES (MULTI): ICD-10-CM

## 2023-11-08 DIAGNOSIS — E78.5 HYPERLIPIDEMIA, UNSPECIFIED HYPERLIPIDEMIA TYPE: ICD-10-CM

## 2023-11-08 DIAGNOSIS — E11.9 CONTROLLED TYPE 2 DIABETES MELLITUS WITHOUT COMPLICATION, WITHOUT LONG-TERM CURRENT USE OF INSULIN (MULTI): ICD-10-CM

## 2023-11-08 DIAGNOSIS — E06.3 HASHIMOTO'S THYROIDITIS: ICD-10-CM

## 2023-11-08 DIAGNOSIS — I10 HYPERTENSION, UNSPECIFIED TYPE: ICD-10-CM

## 2023-11-08 DIAGNOSIS — F43.10 PTSD (POST-TRAUMATIC STRESS DISORDER): ICD-10-CM

## 2023-11-08 DIAGNOSIS — I48.92 PAROXYSMAL ATRIAL FLUTTER (MULTI): ICD-10-CM

## 2023-11-08 DIAGNOSIS — E11.9 CONTROLLED TYPE 2 DIABETES MELLITUS WITHOUT COMPLICATION, WITHOUT LONG-TERM CURRENT USE OF INSULIN (MULTI): Primary | ICD-10-CM

## 2023-11-08 DIAGNOSIS — F41.9 ANXIETY DISORDER, UNSPECIFIED TYPE: ICD-10-CM

## 2023-11-08 DIAGNOSIS — N95.8 OTHER SPECIFIED MENOPAUSAL AND PERIMENOPAUSAL DISORDERS: ICD-10-CM

## 2023-11-08 DIAGNOSIS — C50.919 MALIGNANT NEOPLASM OF FEMALE BREAST, UNSPECIFIED ESTROGEN RECEPTOR STATUS, UNSPECIFIED LATERALITY, UNSPECIFIED SITE OF BREAST (MULTI): ICD-10-CM

## 2023-11-08 DIAGNOSIS — F51.01 PRIMARY INSOMNIA: ICD-10-CM

## 2023-11-08 DIAGNOSIS — M81.0 OSTEOPOROSIS, UNSPECIFIED OSTEOPOROSIS TYPE, UNSPECIFIED PATHOLOGICAL FRACTURE PRESENCE: ICD-10-CM

## 2023-11-08 DIAGNOSIS — Z13.820 SCREENING FOR OSTEOPOROSIS: ICD-10-CM

## 2023-11-08 PROBLEM — R73.03 PREDIABETES: Status: RESOLVED | Noted: 2023-05-08 | Resolved: 2023-11-08

## 2023-11-08 LAB
CREAT UR-MCNC: 81.5 MG/DL (ref 20–320)
EST. AVERAGE GLUCOSE BLD GHB EST-MCNC: 126 MG/DL
HBA1C MFR BLD: 6 %
MICROALBUMIN UR-MCNC: 12 MG/L
MICROALBUMIN/CREAT UR: 14.7 UG/MG CREAT

## 2023-11-08 PROCEDURE — 99214 OFFICE O/P EST MOD 30 MIN: CPT | Performed by: INTERNAL MEDICINE

## 2023-11-08 PROCEDURE — 36415 COLL VENOUS BLD VENIPUNCTURE: CPT

## 2023-11-08 PROCEDURE — 1160F RVW MEDS BY RX/DR IN RCRD: CPT | Performed by: INTERNAL MEDICINE

## 2023-11-08 PROCEDURE — 82043 UR ALBUMIN QUANTITATIVE: CPT

## 2023-11-08 PROCEDURE — 3075F SYST BP GE 130 - 139MM HG: CPT | Performed by: INTERNAL MEDICINE

## 2023-11-08 PROCEDURE — 3078F DIAST BP <80 MM HG: CPT | Performed by: INTERNAL MEDICINE

## 2023-11-08 PROCEDURE — 1159F MED LIST DOCD IN RCRD: CPT | Performed by: INTERNAL MEDICINE

## 2023-11-08 PROCEDURE — 83036 HEMOGLOBIN GLYCOSYLATED A1C: CPT

## 2023-11-08 PROCEDURE — 1036F TOBACCO NON-USER: CPT | Performed by: INTERNAL MEDICINE

## 2023-11-08 PROCEDURE — 82570 ASSAY OF URINE CREATININE: CPT

## 2023-11-08 PROCEDURE — 1126F AMNT PAIN NOTED NONE PRSNT: CPT | Performed by: INTERNAL MEDICINE

## 2023-11-08 RX ORDER — ESCITALOPRAM OXALATE 10 MG/1
10 TABLET ORAL DAILY
Qty: 30 TABLET | Refills: 5 | Status: SHIPPED | OUTPATIENT
Start: 2023-11-08 | End: 2024-01-29 | Stop reason: SDUPTHER

## 2023-11-08 RX ORDER — TRAZODONE HYDROCHLORIDE 50 MG/1
50 TABLET ORAL NIGHTLY
Qty: 30 TABLET | Refills: 3 | Status: SHIPPED | OUTPATIENT
Start: 2023-11-08 | End: 2024-02-19 | Stop reason: ALTCHOICE

## 2023-11-08 ASSESSMENT — ENCOUNTER SYMPTOMS
SLEEP DISTURBANCE: 1
CONSTITUTIONAL NEGATIVE: 1
AGITATION: 1
DEPRESSION: 0
DYSPHORIC MOOD: 1

## 2023-11-08 ASSESSMENT — COLUMBIA-SUICIDE SEVERITY RATING SCALE - C-SSRS
1. IN THE PAST MONTH, HAVE YOU WISHED YOU WERE DEAD OR WISHED YOU COULD GO TO SLEEP AND NOT WAKE UP?: NO
6. HAVE YOU EVER DONE ANYTHING, STARTED TO DO ANYTHING, OR PREPARED TO DO ANYTHING TO END YOUR LIFE?: NO
2. HAVE YOU ACTUALLY HAD ANY THOUGHTS OF KILLING YOURSELF?: NO

## 2023-11-09 ENCOUNTER — TELEPHONE (OUTPATIENT)
Dept: PRIMARY CARE | Facility: CLINIC | Age: 83
End: 2023-11-09
Payer: MEDICARE

## 2023-11-09 NOTE — TELEPHONE ENCOUNTER
Spk w pt abt good results----- Message from Bhavna Costa MD sent at 11/9/2023  9:56 AM EST -----  Call  results message below.

## 2023-12-12 DIAGNOSIS — I10 HYPERTENSION, UNSPECIFIED TYPE: ICD-10-CM

## 2023-12-12 RX ORDER — HYDROCHLOROTHIAZIDE 12.5 MG/1
12.5 TABLET ORAL DAILY
Qty: 90 TABLET | Refills: 1 | Status: SHIPPED | OUTPATIENT
Start: 2023-12-12 | End: 2024-01-19 | Stop reason: SDUPTHER

## 2023-12-21 ENCOUNTER — PREP FOR PROCEDURE (OUTPATIENT)
Dept: ORTHOPEDIC SURGERY | Facility: CLINIC | Age: 83
End: 2023-12-21
Payer: MEDICARE

## 2023-12-21 DIAGNOSIS — M17.12 PRIMARY OSTEOARTHRITIS OF LEFT KNEE: Primary | ICD-10-CM

## 2023-12-21 RX ORDER — GABAPENTIN 300 MG/1
600 CAPSULE ORAL ONCE
Status: CANCELLED | OUTPATIENT
Start: 2023-12-21 | End: 2023-12-21

## 2023-12-21 RX ORDER — CELECOXIB 400 MG/1
400 CAPSULE ORAL ONCE
Status: CANCELLED | OUTPATIENT
Start: 2023-12-21 | End: 2023-12-21

## 2023-12-21 RX ORDER — SODIUM CHLORIDE, SODIUM LACTATE, POTASSIUM CHLORIDE, CALCIUM CHLORIDE 600; 310; 30; 20 MG/100ML; MG/100ML; MG/100ML; MG/100ML
100 INJECTION, SOLUTION INTRAVENOUS CONTINUOUS
Status: CANCELLED | OUTPATIENT
Start: 2023-12-21

## 2023-12-21 RX ORDER — ONDANSETRON 4 MG/1
4 TABLET, ORALLY DISINTEGRATING ORAL ONCE
Status: CANCELLED | OUTPATIENT
Start: 2023-12-21 | End: 2023-12-21

## 2023-12-21 RX ORDER — OXYCODONE HYDROCHLORIDE 5 MG/1
10 TABLET ORAL ONCE
Status: CANCELLED | OUTPATIENT
Start: 2023-12-21 | End: 2023-12-21

## 2024-01-06 DIAGNOSIS — I10 HYPERTENSION, UNSPECIFIED TYPE: ICD-10-CM

## 2024-01-06 RX ORDER — METOPROLOL SUCCINATE 25 MG/1
25 TABLET, EXTENDED RELEASE ORAL DAILY
Qty: 90 TABLET | Refills: 0 | Status: SHIPPED | OUTPATIENT
Start: 2024-01-06 | End: 2024-04-01 | Stop reason: SDUPTHER

## 2024-01-11 ENCOUNTER — APPOINTMENT (OUTPATIENT)
Dept: RADIOLOGY | Facility: CLINIC | Age: 84
End: 2024-01-11
Payer: MEDICARE

## 2024-01-19 ENCOUNTER — APPOINTMENT (OUTPATIENT)
Dept: RADIOLOGY | Facility: HOSPITAL | Age: 84
End: 2024-01-19
Payer: MEDICARE

## 2024-01-19 DIAGNOSIS — I10 HYPERTENSION, UNSPECIFIED TYPE: ICD-10-CM

## 2024-01-19 RX ORDER — HYDROCHLOROTHIAZIDE 12.5 MG/1
12.5 TABLET ORAL DAILY
Qty: 90 TABLET | Refills: 0 | Status: SHIPPED | OUTPATIENT
Start: 2024-01-19

## 2024-01-21 ENCOUNTER — HOSPITAL ENCOUNTER (EMERGENCY)
Facility: HOSPITAL | Age: 84
Discharge: HOME | End: 2024-01-21
Attending: STUDENT IN AN ORGANIZED HEALTH CARE EDUCATION/TRAINING PROGRAM
Payer: MEDICARE

## 2024-01-21 ENCOUNTER — APPOINTMENT (OUTPATIENT)
Dept: RADIOLOGY | Facility: HOSPITAL | Age: 84
End: 2024-01-21
Payer: MEDICARE

## 2024-01-21 VITALS
HEART RATE: 93 BPM | WEIGHT: 172.4 LBS | TEMPERATURE: 97.5 F | BODY MASS INDEX: 28.72 KG/M2 | RESPIRATION RATE: 18 BRPM | SYSTOLIC BLOOD PRESSURE: 143 MMHG | OXYGEN SATURATION: 95 % | DIASTOLIC BLOOD PRESSURE: 72 MMHG | HEIGHT: 65 IN

## 2024-01-21 DIAGNOSIS — S09.90XA HEAD INJURY, INITIAL ENCOUNTER: ICD-10-CM

## 2024-01-21 DIAGNOSIS — S01.81XA FACIAL LACERATION, INITIAL ENCOUNTER: ICD-10-CM
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
## 2024-01-21 PROCEDURE — 90715 TDAP VACCINE 7 YRS/> IM: CPT | Performed by: PHYSICIAN ASSISTANT

## 2024-01-21 PROCEDURE — 2500000004 HC RX 250 GENERAL PHARMACY W/ HCPCS (ALT 636 FOR OP/ED): Performed by: PHYSICIAN ASSISTANT

## 2024-01-21 PROCEDURE — 70450 CT HEAD/BRAIN W/O DYE: CPT

## 2024-01-21 PROCEDURE — 2500000001 HC RX 250 WO HCPCS SELF ADMINISTERED DRUGS (ALT 637 FOR MEDICARE OP): Performed by: PHYSICIAN ASSISTANT

## 2024-01-21 PROCEDURE — 72125 CT NECK SPINE W/O DYE: CPT | Mod: FOREIGN READ | Performed by: RADIOLOGY

## 2024-01-21 PROCEDURE — 72125 CT NECK SPINE W/O DYE: CPT

## 2024-01-21 PROCEDURE — 70450 CT HEAD/BRAIN W/O DYE: CPT | Mod: FOREIGN READ | Performed by: RADIOLOGY

## 2024-01-21 PROCEDURE — 12031 INTMD RPR S/A/T/EXT 2.5 CM/<: CPT

## 2024-01-21 PROCEDURE — 99285 EMERGENCY DEPT VISIT HI MDM: CPT | Performed by: STUDENT IN AN ORGANIZED HEALTH CARE EDUCATION/TRAINING PROGRAM

## 2024-01-21 PROCEDURE — 90471 IMMUNIZATION ADMIN: CPT | Performed by: PHYSICIAN ASSISTANT

## 2024-01-21 RX ORDER — DEXTROMETHORPHAN HYDROBROMIDE, GUAIFENESIN 5; 100 MG/5ML; MG/5ML
650 LIQUID ORAL 2 TIMES DAILY
COMMUNITY
End: 2024-03-07 | Stop reason: HOSPADM

## 2024-01-21 RX ORDER — ACETAMINOPHEN 325 MG/1
975 TABLET ORAL ONCE
Status: DISCONTINUED | OUTPATIENT
Start: 2024-01-21 | End: 2024-01-22 | Stop reason: HOSPADM

## 2024-01-21 RX ADMIN — LIDOCAINE-EPINEPHRINE-TETRACAINE GEL 4-0.05-0.5% 1 APPLICATION: 4-0.05-0.5 GEL at 20:30

## 2024-01-21 RX ADMIN — TETANUS TOXOID, REDUCED DIPHTHERIA TOXOID AND ACELLULAR PERTUSSIS VACCINE, ADSORBED 0.5 ML: 5; 2.5; 8; 8; 2.5 SUSPENSION INTRAMUSCULAR at 22:12

## 2024-01-21 ASSESSMENT — PAIN DESCRIPTION - DESCRIPTORS: DESCRIPTORS: THROBBING

## 2024-01-21 ASSESSMENT — LIFESTYLE VARIABLES
EVER FELT BAD OR GUILTY ABOUT YOUR DRINKING: NO
EVER HAD A DRINK FIRST THING IN THE MORNING TO STEADY YOUR NERVES TO GET RID OF A HANGOVER: NO
EVER FELT BAD OR GUILTY ABOUT YOUR DRINKING: NO
HAVE YOU EVER FELT YOU SHOULD CUT DOWN ON YOUR DRINKING: NO
REASON UNABLE TO ASSESS: NO
REASON UNABLE TO ASSESS: NO
HAVE PEOPLE ANNOYED YOU BY CRITICIZING YOUR DRINKING: NO

## 2024-01-21 ASSESSMENT — COLUMBIA-SUICIDE SEVERITY RATING SCALE - C-SSRS
1. IN THE PAST MONTH, HAVE YOU WISHED YOU WERE DEAD OR WISHED YOU COULD GO TO SLEEP AND NOT WAKE UP?: NO
6. HAVE YOU EVER DONE ANYTHING, STARTED TO DO ANYTHING, OR PREPARED TO DO ANYTHING TO END YOUR LIFE?: NO
1. IN THE PAST MONTH, HAVE YOU WISHED YOU WERE DEAD OR WISHED YOU COULD GO TO SLEEP AND NOT WAKE UP?: NO
6. HAVE YOU EVER DONE ANYTHING, STARTED TO DO ANYTHING, OR PREPARED TO DO ANYTHING TO END YOUR LIFE?: NO
2. HAVE YOU ACTUALLY HAD ANY THOUGHTS OF KILLING YOURSELF?: NO
2. HAVE YOU ACTUALLY HAD ANY THOUGHTS OF KILLING YOURSELF?: NO

## 2024-01-21 ASSESSMENT — PAIN DESCRIPTION - LOCATION: LOCATION: HEAD

## 2024-01-21 ASSESSMENT — PAIN DESCRIPTION - PAIN TYPE: TYPE: ACUTE PAIN

## 2024-01-21 ASSESSMENT — PAIN SCALES - GENERAL: PAINLEVEL_OUTOF10: 4

## 2024-01-21 ASSESSMENT — PAIN - FUNCTIONAL ASSESSMENT: PAIN_FUNCTIONAL_ASSESSMENT: 0-10

## 2024-01-21 ASSESSMENT — PAIN DESCRIPTION - PROGRESSION: CLINICAL_PROGRESSION: NOT CHANGED

## 2024-01-21 ASSESSMENT — PAIN DESCRIPTION - FREQUENCY: FREQUENCY: CONSTANT/CONTINUOUS

## 2024-01-22 NOTE — DISCHARGE INSTRUCTIONS
Keep wound clean and dry.  Your sutures are dissolvable.  Please remove with a metal dissolved within 14 days.

## 2024-01-22 NOTE — ED TRIAGE NOTES
Mechanical fall without LOC. No blood thinners.  Laceration above left eye, abrasion to nose.  Bleeding controled.  Pt a/ox3   no

## 2024-01-22 NOTE — ED PROVIDER NOTES
HPI   No chief complaint on file.      83-year-old female presented emergency department with a chief complaint mechanical trip and fall.  Struck her head, not anticoagulated.  Sustained laceration.  Complains of head and neck pain.  Denies preceding lightheadedness dizziness chest pain shortness of breath well-appearing nontoxic afebrile.  No other complaint.                          No data recorded                Patient History   Past Medical History:   Diagnosis Date   • Abnormal finding of blood chemistry, unspecified 10/04/2016    Abnormal blood chemistry   • Body mass index (BMI) 27.0-27.9, adult 08/03/2022    BMI 27.0-27.9,adult   • Body mass index (BMI) 28.0-28.9, adult 06/29/2022    BMI 28.0-28.9,adult   • Body mass index (BMI) 28.0-28.9, adult 08/13/2021    BMI 28.0-28.9,adult   • Body mass index (BMI) 28.0-28.9, adult 05/22/2022    BMI 28.0-28.9,adult   • Body mass index (BMI) 29.0-29.9, adult 08/26/2020    BMI 29.0-29.9,adult   • Body mass index (BMI)30.0-30.9, adult 11/21/2019    BMI 30.0-30.9,adult   • Disorder of bone, unspecified 04/26/2016    Disorder of bone and articular cartilage   • Disorder of bone, unspecified 02/11/2021    Humerus lesion, left   • Displaced comminuted supracondylar fracture without intercondylar fracture of right humerus, initial encounter for closed fracture 08/13/2021    Closed displaced comminuted supracondylar fracture of right humerus without intercondylar fracture, initial encounter   • Encounter for general adult medical examination without abnormal findings 08/03/2020    Medicare annual wellness visit, initial   • Epistaxis 04/25/2022    Frequent epistaxis   • Flatulence 04/25/2022    Excessive gas   • Hypo-osmolality and hyponatremia 09/20/2016    Hyponatremia   • Other abnormality of red blood cells 08/13/2021    Elevated hematocrit   • Other displaced fracture of upper end of left humerus, sequela 08/13/2021    Other closed displaced fracture of proximal end of  left humerus, sequela   • Pain in left hip 08/13/2021    Hip pain, left   • Pain in left knee 08/13/2021    Knee pain, left   • Pain in unspecified joint 08/13/2021    Pain in joint involving multiple sites   • Personal history of diseases of the skin and subcutaneous tissue 02/25/2020    History of sebaceous cyst   • Personal history of other diseases of the circulatory system     History of hypertension   • Personal history of other diseases of the digestive system 12/08/2015    History of constipation   • Personal history of other diseases of the nervous system and sense organs     History of cataract   • Personal history of other drug therapy 04/26/2016    History of pneumococcal vaccination   • Personal history of other drug therapy 11/01/2021    History of influenza vaccination   • Personal history of other drug therapy 10/27/2018    History of influenza vaccination   • Personal history of other endocrine, nutritional and metabolic disease 03/08/2018    History of thyroid nodule   • Personal history of other mental and behavioral disorders 08/05/2020    History of adjustment disorder   • Personal history of other specified conditions 11/30/2020    History of insomnia   • Personal history of other specified conditions 12/28/2020    History of fatigue   • Polyp of colon 12/08/2015    Hyperplastic colon polyp   • Presence of spectacles and contact lenses     Wears glasses   • Sebaceous cyst 05/17/2018    Infected sebaceous cyst   • Unspecified fall, initial encounter 08/13/2021    Accidental fall, initial encounter     Past Surgical History:   Procedure Laterality Date   • CATARACT EXTRACTION  09/04/2018    Cataract Surgery   • COLONOSCOPY  12/08/2015    Complete Colonoscopy   • OTHER SURGICAL HISTORY  02/25/2020    Mohs surgery nose   • OTHER SURGICAL HISTORY  08/13/2021    Humerus fracture repair   • REVISION TOTAL HIP ARTHROPLASTY Left 07/19/2023   • TOTAL KNEE ARTHROPLASTY  01/13/2014    Knee Replacement      Family History   Problem Relation Name Age of Onset   • Other (Malignant Neoplasm) Mother     • Lung cancer Mother     • Other (Laryngeal Cancer) Father     • Other (Malignant Neoplasm) Father       Social History     Tobacco Use   • Smoking status: Former     Types: Cigarettes     Quit date: 1967     Years since quittin.0   • Smokeless tobacco: Never   Substance Use Topics   • Alcohol use: Yes     Alcohol/week: 6.0 standard drinks of alcohol     Types: 6 Cans of beer per week   • Drug use: Never       Physical Exam   ED Triage Vitals   Temp Pulse Resp BP   -- -- -- --      SpO2 Temp src Heart Rate Source Patient Position   -- -- -- --      BP Location FiO2 (%)     -- --       Physical Exam  Vitals reviewed.   Constitutional:       Appearance: Normal appearance.   HENT:      Head: Normocephalic.      Nose: Nose normal.      Mouth/Throat:      Mouth: Mucous membranes are moist.   Cardiovascular:      Rate and Rhythm: Normal rate and regular rhythm.   Pulmonary:      Effort: Pulmonary effort is normal.      Breath sounds: Normal breath sounds.   Abdominal:      Palpations: Abdomen is soft.   Musculoskeletal:         General: No swelling or tenderness. Normal range of motion.      Cervical back: Normal range of motion.   Skin:     Comments: 2.5 cm V-shaped laceration to superior left eyebrow   Neurological:      General: No focal deficit present.      Mental Status: She is alert and oriented to person, place, and time. Mental status is at baseline.         ED Course & MDM   Diagnoses as of 24   Fall, initial encounter   Head injury, initial encounter   Facial laceration, initial encounter       Medical Decision Making  I have seen and evaluated this patient.  The attending physician has also seen and evaluated this patient.  Vital signs, laboratory testing and diagnostic images if applicable have been reviewed.  All laboratory and imaging is interpreted by myself unless otherwise stated.  Radiology  studies are also formally interpreted by radiologist.    Repair without complication, tetanus up-to-date, negative CT brain.  Anticipate discharge if C-spine is negative.  Released in good condition.    Labs Reviewed - No data to display  CT head wo IV contrast    (Results Pending)   CT cervical spine wo IV contrast    (Results Pending)     Medications   diphth,pertus(acell),tetanus (BoostRIX) 2.5-8-5 Lf-mcg-Lf/0.5mL vaccine 0.5 mL (has no administration in time range)   acetaminophen (Tylenol) tablet 975 mg (975 mg oral Not Given 1/21/24 2030)   lidocaine-racepinep-tetracaine (LET) 4-0.05-0.5 % gel 1 Application (1 Application Topical Given 1/21/24 2030)     New Prescriptions    No medications on file         Procedure  Laceration Repair    Performed by: Lamonte Nguyễn PA-C  Authorized by: Nely Moralez MD    Consent:     Consent obtained:  Verbal  Laceration details:     Location:  Scalp    Scalp location:  Frontal    Length (cm):  2.5    Depth (mm):  0.2  Pre-procedure details:     Preparation:  Patient was prepped and draped in usual sterile fashion  Treatment:     Area cleansed with:  Chlorhexidine    Amount of cleaning:  Extensive  Skin repair:     Repair method:  Sutures    Suture size:  5-0    Suture material:  Fast-absorbing gut    Suture technique:  Simple interrupted    Number of sutures:  3  Approximation:     Approximation:  Close  Repair type:     Repair type:  Intermediate  Post-procedure details:     Dressing:  Antibiotic ointment    Procedure completion:  Tolerated       Lamonte Nguyễn PA-C  01/21/24 [id number]

## 2024-01-24 ENCOUNTER — HOSPITAL ENCOUNTER (OUTPATIENT)
Dept: RADIOLOGY | Facility: HOSPITAL | Age: 84
Discharge: HOME | End: 2024-01-24
Payer: MEDICARE

## 2024-01-24 DIAGNOSIS — M17.12 UNILATERAL PRIMARY OSTEOARTHRITIS, LEFT KNEE: ICD-10-CM

## 2024-01-24 PROCEDURE — 73700 CT LOWER EXTREMITY W/O DYE: CPT | Mod: LT

## 2024-01-25 ENCOUNTER — OFFICE VISIT (OUTPATIENT)
Dept: PRIMARY CARE | Facility: CLINIC | Age: 84
End: 2024-01-25
Payer: MEDICARE

## 2024-01-25 VITALS
WEIGHT: 160 LBS | HEART RATE: 63 BPM | SYSTOLIC BLOOD PRESSURE: 115 MMHG | OXYGEN SATURATION: 92 % | DIASTOLIC BLOOD PRESSURE: 71 MMHG | BODY MASS INDEX: 26.66 KG/M2 | HEIGHT: 65 IN
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
## 2024-01-25 DIAGNOSIS — S09.90XD INJURY OF HEAD, SUBSEQUENT ENCOUNTER: ICD-10-CM
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
## 2024-01-25 PROCEDURE — 3074F SYST BP LT 130 MM HG: CPT | Performed by: INTERNAL MEDICINE

## 2024-01-25 PROCEDURE — 1160F RVW MEDS BY RX/DR IN RCRD: CPT | Performed by: INTERNAL MEDICINE

## 2024-01-25 PROCEDURE — 99213 OFFICE O/P EST LOW 20 MIN: CPT | Performed by: INTERNAL MEDICINE

## 2024-01-25 PROCEDURE — 1126F AMNT PAIN NOTED NONE PRSNT: CPT | Performed by: INTERNAL MEDICINE

## 2024-01-25 PROCEDURE — 3078F DIAST BP <80 MM HG: CPT | Performed by: INTERNAL MEDICINE

## 2024-01-25 PROCEDURE — 1036F TOBACCO NON-USER: CPT | Performed by: INTERNAL MEDICINE

## 2024-01-25 PROCEDURE — 1159F MED LIST DOCD IN RCRD: CPT | Performed by: INTERNAL MEDICINE

## 2024-01-25 ASSESSMENT — COLUMBIA-SUICIDE SEVERITY RATING SCALE - C-SSRS
6. HAVE YOU EVER DONE ANYTHING, STARTED TO DO ANYTHING, OR PREPARED TO DO ANYTHING TO END YOUR LIFE?: NO
1. IN THE PAST MONTH, HAVE YOU WISHED YOU WERE DEAD OR WISHED YOU COULD GO TO SLEEP AND NOT WAKE UP?: NO
2. HAVE YOU ACTUALLY HAD ANY THOUGHTS OF KILLING YOURSELF?: NO

## 2024-01-25 ASSESSMENT — ENCOUNTER SYMPTOMS
OCCASIONAL FEELINGS OF UNSTEADINESS: 0
DEPRESSION: 0
LOSS OF SENSATION IN FEET: 0

## 2024-01-25 NOTE — PROGRESS NOTES
"Subjective   Patient ID: Flower Lucas is a 83 y.o. female who presents for Hospital Follow-up.    HPI     Review of Systems    Objective   /71   Pulse 63   Ht 1.651 m (5' 5\")   Wt 72.6 kg (160 lb)   SpO2 92%   BMI 26.63 kg/m²     Physical Exam    Assessment/Plan          "

## 2024-01-25 NOTE — PROGRESS NOTES
"Subjective   Patient ID: Flower Lucas is a 83 y.o. female who presents for Hospital Follow-up.    HPI patient presents to clinic as follow-up visit from [facility], [place] emergency room after having fallen at home.  She hit her heart on the cabinet without having any loss of consciousness.  She was worked up in the emergency room with CT of the head and cervical spine which came back negative for any subdural hematoma and showed some cervical arthritis without any fractures or dislocation.  She did have minor laceration on left side of her face which was repaired.  She seems to be doing fairly well and denies any headache, facial pain, hearing loss, visual changes paresthesia and swelling of the face.   She has past history of epilepsy, gouty arthritis, hyperlipidemia, hypertension, hypothyroidism, osteoporosis, status post left hip arthroplasty secondary to osteoarthritis on 7/19/2023, prediabetes osteoarthritis and obstructive sleep apnea.     Review of Systems   Constitutional: Negative.    HENT: Negative.     Eyes: Negative.    Respiratory: Negative.     Cardiovascular: Negative.    Gastrointestinal: Negative.    Endocrine: Negative.    Genitourinary: Negative.    Musculoskeletal: Negative.    Skin: Negative.    Allergic/Immunologic: Negative.    Neurological: Negative.    Hematological: Negative.    Psychiatric/Behavioral: Negative.         Objective   /71   Pulse 63   Ht 1.651 m (5' 5\")   Wt 72.6 kg (160 lb)   SpO2 92%   BMI 26.63 kg/m²     Physical Exam  Constitutional:       Appearance: Normal appearance. She is obese.   HENT:      Head:      Comments: Mild bruising on the left side of the foot     Right Ear: Tympanic membrane normal.      Left Ear: Tympanic membrane and ear canal normal.      Nose: Nose normal.   Neck:      Vascular: No carotid bruit.   Cardiovascular:      Rate and Rhythm: Normal rate.   Pulmonary:      Effort: No respiratory distress.      Breath sounds: No stridor. No wheezing. "   Abdominal:      Palpations: Abdomen is soft.      Tenderness: There is no guarding or rebound.   Skin:     Coloration: Skin is not jaundiced.   Neurological:      Mental Status: She is alert.   Psychiatric:         Mood and Affect: Mood normal.         Assessment/Plan    patient is given reassurance.  She is advised to apply ice on the left side of her face in view of mild ecchymosis and take  Tylenol for pain.  She will continue other home medication and will notify the clinic if she has any change in her clinical condition.

## 2024-01-28 ASSESSMENT — ENCOUNTER SYMPTOMS
EYES NEGATIVE: 1
PSYCHIATRIC NEGATIVE: 1
RESPIRATORY NEGATIVE: 1
ALLERGIC/IMMUNOLOGIC NEGATIVE: 1
MUSCULOSKELETAL NEGATIVE: 1
ENDOCRINE NEGATIVE: 1
CONSTITUTIONAL NEGATIVE: 1
CARDIOVASCULAR NEGATIVE: 1
GASTROINTESTINAL NEGATIVE: 1
NEUROLOGICAL NEGATIVE: 1
HEMATOLOGIC/LYMPHATIC NEGATIVE: 1

## 2024-01-29 DIAGNOSIS — F43.10 PTSD (POST-TRAUMATIC STRESS DISORDER): ICD-10-CM

## 2024-01-29 RX ORDER — LAMOTRIGINE 100 MG/1
100 TABLET ORAL DAILY
Qty: 30 TABLET | Refills: 1 | Status: SHIPPED | OUTPATIENT
Start: 2024-01-29 | End: 2024-02-12 | Stop reason: SDUPTHER

## 2024-01-29 RX ORDER — ESCITALOPRAM OXALATE 10 MG/1
10 TABLET ORAL DAILY
Qty: 30 TABLET | Refills: 5 | Status: SHIPPED | OUTPATIENT
Start: 2024-01-29 | End: 2024-07-27

## 2024-02-12 DIAGNOSIS — I10 HYPERTENSION, UNSPECIFIED TYPE: ICD-10-CM

## 2024-02-12 DIAGNOSIS — E03.9 HYPOTHYROIDISM, UNSPECIFIED TYPE: ICD-10-CM

## 2024-02-12 DIAGNOSIS — F43.10 PTSD (POST-TRAUMATIC STRESS DISORDER): ICD-10-CM

## 2024-02-12 RX ORDER — LAMOTRIGINE 100 MG/1
100 TABLET ORAL 2 TIMES DAILY
Qty: 60 TABLET | Refills: 1 | Status: SHIPPED | OUTPATIENT
Start: 2024-02-12 | End: 2024-04-01 | Stop reason: SDUPTHER

## 2024-02-12 RX ORDER — LEVOTHYROXINE SODIUM 112 UG/1
112 TABLET ORAL DAILY
Qty: 90 TABLET | Refills: 1 | Status: SHIPPED | OUTPATIENT
Start: 2024-02-12 | End: 2024-06-03 | Stop reason: SDUPTHER

## 2024-02-16 DIAGNOSIS — M81.0 OSTEOPOROSIS, UNSPECIFIED OSTEOPOROSIS TYPE, UNSPECIFIED PATHOLOGICAL FRACTURE PRESENCE: Primary | ICD-10-CM

## 2024-02-16 RX ORDER — ALBUTEROL SULFATE 0.83 MG/ML
3 SOLUTION RESPIRATORY (INHALATION) AS NEEDED
Status: CANCELLED | OUTPATIENT
Start: 2024-02-19

## 2024-02-16 RX ORDER — DIPHENHYDRAMINE HYDROCHLORIDE 50 MG/ML
50 INJECTION INTRAMUSCULAR; INTRAVENOUS AS NEEDED
Status: CANCELLED | OUTPATIENT
Start: 2024-02-19

## 2024-02-16 RX ORDER — FAMOTIDINE 10 MG/ML
20 INJECTION INTRAVENOUS ONCE AS NEEDED
Status: CANCELLED | OUTPATIENT
Start: 2024-02-19

## 2024-02-16 RX ORDER — EPINEPHRINE 0.3 MG/.3ML
0.3 INJECTION SUBCUTANEOUS EVERY 5 MIN PRN
Status: CANCELLED | OUTPATIENT
Start: 2024-02-19

## 2024-02-19 ENCOUNTER — PRE-ADMISSION TESTING (OUTPATIENT)
Dept: PREADMISSION TESTING | Facility: HOSPITAL | Age: 84
End: 2024-02-19
Payer: MEDICARE

## 2024-02-19 VITALS
RESPIRATION RATE: 16 BRPM | HEIGHT: 65 IN | SYSTOLIC BLOOD PRESSURE: 104 MMHG | OXYGEN SATURATION: 96 % | TEMPERATURE: 97.5 F | BODY MASS INDEX: 26.08 KG/M2 | WEIGHT: 156.53 LBS | DIASTOLIC BLOOD PRESSURE: 63 MMHG | HEART RATE: 65 BPM

## 2024-02-19 DIAGNOSIS — Z01.818 PRE-OP EXAMINATION: ICD-10-CM

## 2024-02-19 DIAGNOSIS — M17.12 PRIMARY OSTEOARTHRITIS OF LEFT KNEE: ICD-10-CM

## 2024-02-19 LAB
ABO GROUP (TYPE) IN BLOOD: NORMAL
ANION GAP SERPL CALC-SCNC: 11 MMOL/L (ref 10–20)
ANTIBODY SCREEN: NORMAL
APPEARANCE UR: CLEAR
BASOPHILS # BLD AUTO: 0.06 X10*3/UL (ref 0–0.1)
BASOPHILS NFR BLD AUTO: 0.8 %
BILIRUB UR STRIP.AUTO-MCNC: NEGATIVE MG/DL
BUN SERPL-MCNC: 18 MG/DL (ref 6–23)
CALCIUM SERPL-MCNC: 9.3 MG/DL (ref 8.6–10.3)
CHLORIDE SERPL-SCNC: 102 MMOL/L (ref 98–107)
CO2 SERPL-SCNC: 28 MMOL/L (ref 21–32)
COLOR UR: YELLOW
CREAT SERPL-MCNC: 0.8 MG/DL (ref 0.5–1.05)
EGFRCR SERPLBLD CKD-EPI 2021: 73 ML/MIN/1.73M*2
EOSINOPHIL # BLD AUTO: 0.2 X10*3/UL (ref 0–0.4)
EOSINOPHIL NFR BLD AUTO: 2.8 %
ERYTHROCYTE [DISTWIDTH] IN BLOOD BY AUTOMATED COUNT: 14.3 % (ref 11.5–14.5)
GLUCOSE SERPL-MCNC: 107 MG/DL (ref 74–99)
GLUCOSE UR STRIP.AUTO-MCNC: NEGATIVE MG/DL
HCT VFR BLD AUTO: 43.1 % (ref 36–46)
HGB BLD-MCNC: 13.9 G/DL (ref 12–16)
HOLD SPECIMEN: NORMAL
IMM GRANULOCYTES # BLD AUTO: 0.02 X10*3/UL (ref 0–0.5)
IMM GRANULOCYTES NFR BLD AUTO: 0.3 % (ref 0–0.9)
KETONES UR STRIP.AUTO-MCNC: NEGATIVE MG/DL
LEUKOCYTE ESTERASE UR QL STRIP.AUTO: NEGATIVE
LYMPHOCYTES # BLD AUTO: 1.42 X10*3/UL (ref 0.8–3)
LYMPHOCYTES NFR BLD AUTO: 19.9 %
MCH RBC QN AUTO: 32.6 PG (ref 26–34)
MCHC RBC AUTO-ENTMCNC: 32.3 G/DL (ref 32–36)
MCV RBC AUTO: 101 FL (ref 80–100)
MONOCYTES # BLD AUTO: 0.59 X10*3/UL (ref 0.05–0.8)
MONOCYTES NFR BLD AUTO: 8.3 %
NEUTROPHILS # BLD AUTO: 4.83 X10*3/UL (ref 1.6–5.5)
NEUTROPHILS NFR BLD AUTO: 67.9 %
NITRITE UR QL STRIP.AUTO: NEGATIVE
NRBC BLD-RTO: 0 /100 WBCS (ref 0–0)
PH UR STRIP.AUTO: 6 [PH]
PLATELET # BLD AUTO: 363 X10*3/UL (ref 150–450)
POTASSIUM SERPL-SCNC: 4.3 MMOL/L (ref 3.5–5.3)
PROT UR STRIP.AUTO-MCNC: NEGATIVE MG/DL
RBC # BLD AUTO: 4.26 X10*6/UL (ref 4–5.2)
RBC # UR STRIP.AUTO: NEGATIVE /UL
RH FACTOR (ANTIGEN D): NORMAL
SODIUM SERPL-SCNC: 137 MMOL/L (ref 136–145)
SP GR UR STRIP.AUTO: 1.01
UROBILINOGEN UR STRIP.AUTO-MCNC: <2 MG/DL
WBC # BLD AUTO: 7.1 X10*3/UL (ref 4.4–11.3)

## 2024-02-19 PROCEDURE — 87081 CULTURE SCREEN ONLY: CPT | Mod: GEALAB

## 2024-02-19 PROCEDURE — 86900 BLOOD TYPING SEROLOGIC ABO: CPT | Mod: 91

## 2024-02-19 PROCEDURE — 85025 COMPLETE CBC W/AUTO DIFF WBC: CPT

## 2024-02-19 PROCEDURE — 99204 OFFICE O/P NEW MOD 45 MIN: CPT | Performed by: REGISTERED NURSE

## 2024-02-19 PROCEDURE — 82435 ASSAY OF BLOOD CHLORIDE: CPT

## 2024-02-19 PROCEDURE — 93010 ELECTROCARDIOGRAM REPORT: CPT | Performed by: INTERNAL MEDICINE

## 2024-02-19 PROCEDURE — 36415 COLL VENOUS BLD VENIPUNCTURE: CPT

## 2024-02-19 PROCEDURE — 93005 ELECTROCARDIOGRAM TRACING: CPT

## 2024-02-19 PROCEDURE — 81003 URINALYSIS AUTO W/O SCOPE: CPT

## 2024-02-19 RX ORDER — CHLORHEXIDINE GLUCONATE ORAL RINSE 1.2 MG/ML
15 SOLUTION DENTAL 2 TIMES DAILY
Qty: 30 ML | Refills: 0 | Status: SHIPPED | OUTPATIENT
Start: 2024-02-19 | End: 2024-02-20

## 2024-02-19 ASSESSMENT — PAIN SCALES - GENERAL: PAINLEVEL_OUTOF10: 1

## 2024-02-19 ASSESSMENT — CHADS2 SCORE
DIABETES: YES
HYPERTENSION: YES
PRIOR STROKE OR TIA OR THROMBOEMBOLISM: NO
CHF: NO
CHADS2 SCORE: 3
AGE GREATER THAN OR EQUAL TO 75: YES

## 2024-02-19 ASSESSMENT — ENCOUNTER SYMPTOMS
NEUROLOGICAL NEGATIVE: 1
CARDIOVASCULAR NEGATIVE: 1
EYES NEGATIVE: 1
GASTROINTESTINAL NEGATIVE: 1
RESPIRATORY NEGATIVE: 1
ARTHRALGIAS: 1
CONSTITUTIONAL NEGATIVE: 1
NECK STIFFNESS: 1

## 2024-02-19 ASSESSMENT — DUKE ACTIVITY SCORE INDEX (DASI)
CAN YOU DO YARD WORK LIKE RAKING LEAVES, WEEDING OR PUSHING A MOWER: NO
CAN YOU DO HEAVY WORK AROUND THE HOUSE LIKE SCRUBBING FLOORS OR LIFTING AND MOVING HEAVY FURNITURE: NO
CAN YOU PARTICIPATE IN MODERATE RECREATIONAL ACTIVITIES LIKE GOLF, BOWLING, DANCING, DOUBLES TENNIS OR THROWING A BASEBALL OR FOOTBALL: NO
CAN YOU CLIMB A FLIGHT OF STAIRS OR WALK UP A HILL: YES
CAN YOU DO LIGHT WORK AROUND THE HOUSE LIKE DUSTING OR WASHING DISHES: YES
TOTAL_SCORE: 18.95
CAN YOU RUN A SHORT DISTANCE: NO
CAN YOU WALK A BLOCK OR TWO ON LEVEL GROUND: YES
DASI METS SCORE: 5.1
CAN YOU HAVE SEXUAL RELATIONS: NO
CAN YOU PARTICIPATE IN STRENOUS SPORTS LIKE SWIMMING, SINGLES TENNIS, FOOTBALL, BASKETBALL, OR SKIING: NO
CAN YOU WALK INDOORS, SUCH AS AROUND YOUR HOUSE: YES
CAN YOU TAKE CARE OF YOURSELF (EAT, DRESS, BATHE, OR USE TOILET): YES
CAN YOU DO MODERATE WORK AROUND THE HOUSE LIKE VACUUMING, SWEEPING FLOORS OR CARRYING GROCERIES: YES

## 2024-02-19 ASSESSMENT — PAIN - FUNCTIONAL ASSESSMENT: PAIN_FUNCTIONAL_ASSESSMENT: 0-10

## 2024-02-19 ASSESSMENT — LIFESTYLE VARIABLES: SMOKING_STATUS: NONSMOKER

## 2024-02-19 NOTE — PREPROCEDURE INSTRUCTIONS
Medication List            Accurate as of February 19, 2024 10:09 AM. Always use your most recent med list.                acetaminophen 650 mg ER tablet  Commonly known as: Tylenol 8 HOUR  Medication Adjustments for Surgery: Take morning of surgery with sip of water, no other fluids     amLODIPine 5 mg tablet  Commonly known as: Norvasc  Take 1 tablet (5 mg) by mouth once daily.  Medication Adjustments for Surgery: Take morning of surgery with sip of water, no other fluids     biotin 5 mg capsule  Medication Adjustments for Surgery: Stop 7 days before surgery     calcium carbonate-vitamin D3 600 mg-20 mcg (800 unit) tablet  Medication Adjustments for Surgery: Stop 7 days before surgery     cholecalciferol 50,000 unit capsule  Commonly known as: Vitamin D-3  Medication Adjustments for Surgery: Stop 7 days before surgery     cyanocobalamin 1,000 mcg tablet  Commonly known as: Vitamin B-12  Medication Adjustments for Surgery: Stop 7 days before surgery     docusate sodium 100 mg capsule  Commonly known as: Colace  TAKE 1 CAPSULE BY MOUTH TWO TIMES A DAY AS NEEDED  Medication Adjustments for Surgery: Continue until night before surgery     escitalopram 10 mg tablet  Commonly known as: Lexapro  Take 1 tablet (10 mg) by mouth once daily.  Medication Adjustments for Surgery: Continue until night before surgery     hydroCHLOROthiazide 12.5 mg tablet  Commonly known as: HYDRODiuril  Take 1 tablet (12.5 mg) by mouth once daily.  Medication Adjustments for Surgery: Stop 1 day before surgery     lamoTRIgine 100 mg tablet  Commonly known as: LaMICtal  Take 1 tablet (100 mg) by mouth 2 times a day.  Medication Adjustments for Surgery: Take morning of surgery with sip of water, no other fluids     levothyroxine 112 mcg tablet  Commonly known as: Synthroid, Levoxyl  Take 1 tablet (112 mcg) by mouth once daily.  Medication Adjustments for Surgery: Take morning of surgery with sip of water, no other fluids     metoprolol  succinate XL 25 mg 24 hr tablet  Commonly known as: Toprol-XL  Take 1 tablet by mouth once daily  Medication Adjustments for Surgery: Continue until night before surgery     oxybutynin 5 mg tablet  Commonly known as: Ditropan  Take 1 tablet (5 mg) by mouth once daily.  Medication Adjustments for Surgery: Continue until night before surgery     * Prolia 60 mg/mL syringe  Generic drug: denosumab  Inject 1 mL (60 mg) under the skin 1 time for 1 dose.     * DENOSUMAB SUBQ     triamcinolone 0.1 % ointment  Commonly known as: Kenalog  Medication Adjustments for Surgery: Stop 1 day before surgery     Vitamin C 500 mg tablet  Generic drug: ascorbic acid  Medication Adjustments for Surgery: Stop 7 days before surgery     zonisamide 100 mg capsule  Commonly known as: Zonegran  Medication Adjustments for Surgery: Continue until night before surgery           * This list has 2 medication(s) that are the same as other medications prescribed for you. Read the directions carefully, and ask your doctor or other care provider to review them with you.                SURGERY PRE-OPERATIVE INSTRUCTIONS    *You will receive a phone call the day before your procedure  after 2pm, (or the Friday before your surgery if scheduled on a Monday.) Generally the hospital will be calling you with this information after that time.    *You are not to eat after midnight the night before the surgery. You may have 8oz of a clear liquid up until 2 hours prior to arriving to the hospital. The exception is with medications you were instructed to take day of surgery.    *You may take tylenol for pain/discomfort as needed.     *Stop taking all aspirin products, ibuprofen (motrin/advil), naproxen (aleve/naprosyn) for one week prior to surgery.    *Stop taking all vitamins and supplements one week prior to surgery.     *You should not have alcoholic beverages for 24 hours before surgery.     *You should not smoke 24 hours prior to surgery.     *To help  prevent surgical infections bathe/shower with Dial soap the evening before surgery.    *You can wear deodorant but no lotion, powder, or perfume/cologne. You should remove all make-up and nail polish at home.    *If you wear glasses, please bring a case for the glasses with you.    *You will be asked to remove dentures and contacts.     *Please leave all valuables at home.    *You should wear loose, comfortable clothing that will accommodate bandages and/or casts.    *You should notify your doctor of any change in your condition (fever, cold, rash, etc). Surgery may need to be re-scheduled until a time you are in better health.    *A responsible adult is required to accompany you to and from the hospital if you are receiving anesthesia or a sedative. Patients are not permitted to drive for 24 hours after anesthesia.     *You can use the [place] if you wish.     *If you have any further questions please call [facility] [phone].        CHG BODY WASH INSTRUCTIONS    *Begin using your CHG soap five days prior to your scheduled surgery.  Allow the CHG soap to sit on skin for 3 minutes. Do not wash with regular soap after you have used the CHG soap. Pat yourself dry with a clean, fresh towel.    *Wash your face with normal soap and water. Apply the CHG solution to a clean, wet washcloth. Firmly lather your entire body from the neck down. Do not use on your face.     *Do not apply powders, deodorants, or lotions after using CHG wash.    *Dress in clean, freshly laundered night clothes.    *Be sure to sleep with clean, freshly laundered sheets.     *Be aware CHG wash may cause stains on fabrics. Rinse your washcloth and other linens that come in contact with CHG completely. Use non-chlorine detergents to launder items used.     *The morning of surgery is the fifth day, repeat the CHG wash and wear fresh laundered clothes.     *If you have any questions about the CHG soap, call [phone].        CHG oral rinse is used  to kill a bacteria in the mouth known as Staphylococcus aureus. This reduces the risks of surgical site infections.         Using dental rinse: use the CHG oral rinse after you brush your teeth the night before and the morning of the surgery.     Use 1 capful (15ml), swish and gargle for at least 30 seconds. Do not swallow. Spit rinse out.         Do not rinse mouth with water, eat or drink after using CHG mouth rinse.         Possible side effects: CHG rinse will stick to plaque on teeth. Brush and floss just before use. Teeth brushing will help to avoid staining of plaque during use.         Any questions, please call [phone]            NPO Instructions:        Additional Instructions:

## 2024-02-19 NOTE — CPM/PAT H&P
CPM/PAT Evaluation       Name: Flower Lucas (Flower Lucas)  /Age: 1940/83 y.o.     In-Person       Chief Complaint: Evaluation prior to surgery    HPI  83 year old female scheduled for ROBB ARTHROPLASTY TOTAL KNEE - Left on 3/6/24 with Dr. Ramirez secondary to Primary osteoarthritis of left knee. PMHx includes HTN, HLD, DM, hypothyroid, Paroxysmal atrial flutter, PTSD, Anxiety and Insomnia. Presents to [facility] today for preoperative risk stratification and optimization.   Past Medical History:   Diagnosis Date    Abnormal finding of blood chemistry, unspecified 10/04/2016    Abnormal blood chemistry    Anxiety     Arrhythmia     Arthritis     Disorder of bone, unspecified 2016    Disorder of bone and articular cartilage    Disorder of bone, unspecified 2021    Humerus lesion, left    Displaced comminuted supracondylar fracture without intercondylar fracture of right humerus, initial encounter for closed fracture 2021    Closed displaced comminuted supracondylar fracture of right humerus without intercondylar fracture, initial encounter    Encounter for general adult medical examination without abnormal findings 2020    Medicare annual wellness visit, initial    Epistaxis 2022    Frequent epistaxis    Flatulence 2022    Excessive gas    Hypo-osmolality and hyponatremia 2016    Hyponatremia    Hypothyroidism     Other abnormality of red blood cells 2021    Elevated hematocrit    Other displaced fracture of upper end of left humerus, sequela 2021    Other closed displaced fracture of proximal end of left humerus, sequela    Pain in left hip 2021    Hip pain, left    Pain in left knee 2021    Knee pain, left    Pain in unspecified joint 2021    Pain in joint involving multiple sites    Personal history of diseases of the skin and subcutaneous tissue 2020    History of sebaceous cyst    Personal history of other diseases of the  circulatory system     History of hypertension    Personal history of other diseases of the digestive system 12/08/2015    History of constipation    Personal history of other diseases of the nervous system and sense organs     History of cataract    Personal history of other drug therapy 04/26/2016    History of pneumococcal vaccination    Personal history of other drug therapy 11/01/2021    History of influenza vaccination    Personal history of other drug therapy 10/27/2018    History of influenza vaccination    Personal history of other endocrine, nutritional and metabolic disease 03/08/2018    History of thyroid nodule    Personal history of other mental and behavioral disorders 08/05/2020    History of adjustment disorder    Personal history of other specified conditions 11/30/2020    History of insomnia    Personal history of other specified conditions 12/28/2020    History of fatigue    Polyp of colon 12/08/2015    Hyperplastic colon polyp    Presence of spectacles and contact lenses     Wears glasses    Sebaceous cyst 05/17/2018    Infected sebaceous cyst    Seizure disorder (CMS/HCC)     Sleep apnea     Unspecified fall, initial encounter 08/13/2021    Accidental fall, initial encounter       Past Surgical History:   Procedure Laterality Date    APPENDECTOMY      ruptured with peritonitis as a child    CATARACT EXTRACTION  09/04/2018    Cataract Surgery    COLONOSCOPY  12/08/2015    Complete Colonoscopy    HIP ARTHROPLASTY Left     MASTECTOMY Left 2004    with  reconstruction    OTHER SURGICAL HISTORY  02/25/2020    Mohs surgery nose    OTHER SURGICAL HISTORY Left     Humerus fracture repair    REVISION TOTAL HIP ARTHROPLASTY Left 07/19/2023    TOTAL KNEE ARTHROPLASTY Right     Knee Replacement       Patient Sexual activity questions deferred to the physician.    Family History   Problem Relation Name Age of Onset    Other (Malignant Neoplasm) Mother      Lung cancer Mother      Other (Laryngeal Cancer)  Father      Other (Malignant Neoplasm) Father         No Known Allergies    Prior to Admission medications    Medication Sig Start Date End Date Taking? Authorizing Provider   acetaminophen (Tylenol 8 HOUR) 650 mg ER tablet Take 1 tablet (650 mg) by mouth 2 times a day. Do not crush, chew, or split.    Historical Provider, MD   amLODIPine (Norvasc) 5 mg tablet Take 1 tablet (5 mg) by mouth once daily. 8/1/23   Bhavna Costa MD   ascorbic acid (Vitamin C) 500 mg tablet Take 1 tablet (500 mg) by mouth once every 24 hours.    Historical Provider, MD   biotin 5 mg capsule 1 cap(s) orally once a day    Historical Provider, MD   calcium carbonate-vitamin D3 600 mg-20 mcg (800 unit) tablet TAKE 1 TABLET Every twelve hours    Historical Provider, MD   cholecalciferol (Vitamin D-3) 1,250 mcg (50,000 unit) capsule TAKE 1 CAPSULE BY MOUTH ONCE A WEEK 10/8/18   Historical Provider, MD   cyanocobalamin (Vitamin B-12) 1,000 mcg tablet TAKE 1 TABLET DAILY AS DIRECTED. 4/26/16   Historical Provider, MD   denosumab (Prolia) 60 mg/mL syringe Inject 1 mL (60 mg) under the skin 1 time for 1 dose. 5/8/23 1/25/24  Bhavna Costa MD   DENOSUMAB SUBQ Inject under the skin. INJECT EVERY 6 MONTHS PER DIRECTED    Historical Provider, MD   docusate sodium (Colace) 100 mg capsule TAKE 1 CAPSULE BY MOUTH TWO TIMES A DAY AS NEEDED  Patient taking differently: Take 1 capsule (100 mg) by mouth 1 time. 7/19/23 7/18/24  Augustine Ramirez,    escitalopram (Lexapro) 10 mg tablet Take 1 tablet (10 mg) by mouth once daily. 1/29/24 7/27/24  Jon Jamil MD   hydroCHLOROthiazide (HYDRODiuril) 12.5 mg tablet Take 1 tablet (12.5 mg) by mouth once daily. 1/19/24   Vilma Ryder MD   lamoTRIgine (LaMICtal) 100 mg tablet Take 1 tablet (100 mg) by mouth 2 times a day. 2/12/24 4/12/24  Jon Jamil MD   levothyroxine (Synthroid, Levoxyl) 112 mcg tablet Take 1 tablet (112 mcg) by mouth once daily. 2/12/24   Jon Jamil MD   metoprolol succinate XL  (Toprol-XL) 25 mg 24 hr tablet Take 1 tablet by mouth once daily 1/6/24   Jon Jamil MD   oxybutynin (Ditropan) 5 mg tablet Take 1 tablet (5 mg) by mouth once daily. 10/4/23   Bhavna Costa MD   triamcinolone (Kenalog) 0.1 % ointment APPLY AND GENTLY MASSAGE INTO AFFECTED AREA(S) TWICE DAILY.    Historical Provider, MD   zonisamide (Zonegran) 100 mg capsule Take 1 capsule twice daily 9/14/16   Historical Provider, MD   desoximetasone (Topicort) 0.05 % topical gel APPLY AND GENTLY MASSAGE INTO AFFECTED AREA(S) TWICE DAILY. 12/29/12 2/19/24  Historical Provider, MD   gabapentin (Neurontin) 300 mg capsule  7/19/23 2/19/24  Historical Provider, MD   magnesium oxide (Mag-Ox) 400 mg (241.3 mg magnesium) tablet Magnesium Oxide 400 (240 Mg) MG Oral Tablet   Refills: 0       Active  2/19/24  Historical Provider, MD   melatonin 10 mg tablet Take 1 tablet (10 mg) by mouth once daily at bedtime. 12/5/18 2/19/24  Historical Provider, MD   traZODone (Desyrel) 50 mg tablet Take 1 tablet (50 mg) by mouth once daily at bedtime.  Patient not taking: Reported on 1/25/2024 11/8/23 2/19/24  Bhavna Costa MD        PAT ROS:   Constitutional:   neg    Neuro/Psych:   neg    Eyes:   neg    Ears:    Uses hearing aids   hearing loss  Nose:   Mouth:   neg    Throat:   neg    Neck:    Some stiffness with ROM   neck stiffness  Cardio:   neg    Respiratory:   neg    Endocrine:   GI:   neg    :   neg    Musculoskeletal:    arthralgias  Hematologic:   neg    Skin:      Physical Exam  Vitals reviewed.   Constitutional:       Appearance: Normal appearance.   HENT:      Head: Normocephalic and atraumatic.      Nose: Nose normal.      Mouth/Throat:      Mouth: Mucous membranes are moist.      Pharynx: Oropharynx is clear.   Eyes:      Pupils: Pupils are equal, round, and reactive to light.   Neck:      Vascular: Carotid bruit present.      Comments: Some stiffness with ROM  Cardiovascular:      Rate and Rhythm: Normal rate and regular rhythm.       Pulses: Normal pulses.      Heart sounds: Normal heart sounds.   Pulmonary:      Effort: Pulmonary effort is normal.      Breath sounds: Normal breath sounds.   Abdominal:      Palpations: Abdomen is soft.   Musculoskeletal:         General: Tenderness present.      Cervical back: Neck supple.      Comments: L knee   Skin:     General: Skin is warm and dry.      Capillary Refill: Capillary refill takes less than 2 seconds.   Neurological:      Mental Status: She is alert and oriented to person, place, and time.   Psychiatric:         Mood and Affect: Mood normal.         Behavior: Behavior normal.         Thought Content: Thought content normal.         Judgment: Judgment normal.          PAT AIRWAY:   Airway:     Mallampati::  II    TM distance::  >3 FB    Neck ROM::  Limited   Broken left lower tooth      There were no vitals taken for this visit.    DASI Risk Score    No data to display       Caprini DVT Assessment      Flowsheet Row Most Recent Value   DVT Score 12   Current Status Major surgery planned, including arthroscopic and laproscopic (1-2 hours), Elective major lower extremity arthroplasty   History Prior major surgery   Age Over 75 years   BMI 30 or less          Modified Frailty Index    No data to display       CHADS2 Stroke Risk  Current as of 13 minutes ago        N/A 3 - 100%: High Risk   2 - 3%: Medium Risk   0 - 2%: Low Risk     Last Change: N/A          This score determines the patient's risk of having a stroke if the patient has atrial fibrillation.        This score is not applicable to this patient. Components are not calculated.          Revised Cardiac Risk Index      Flowsheet Row Most Recent Value   Revised Cardiac Risk Calculator 0          Apfel Simplified Score      Flowsheet Row Most Recent Value   Apfel Simplified Score Calculator 3          Risk Analysis Index Results This Encounter    No data found in the last 1 encounters.         Assessment and Plan:     Anesthesia:  The  patient notes anesthesia complications in the past related to dizziness with anesthesia during previous total hip arthroplasty    Neuro:   The patient has a history of PTSD, Insomnia, Anxiety and Epilepsy on Desyrel and Lexapro. The patient is at increased risk for postoperative delirium secondary to age 65 or older. The patient is at increased risk for perioperative stroke secondary to hypertension , increased age, hyperlipidemia, female gender, diabetes mellitus.    HEENT/Airway  No diagnoses, significant findings on chart review, clinical presentation, or evaluation.    Cardiovascular  The patient is scheduled for non-cardiac surgery associated with elevated risk.    9/15/23 Vascular Duplex Cardio  DUPLEX IMAGES:     Right Carotid System: No hemodynamically significant stenosis.     Left Carotid System: No hemodynamically significant stenosis.     Patent vertebral arteries bilaterally with normal antegrade flow.     Interpretation is based upon the IAC recommendations which are the 2021  modified diagnostic criteria adopted from the 2003 Society of Radiologist  and Ultrasound Consensus Conference.     IMPRESSION:  Carotid ultrasound shows no evidence for hemodynamically significant  stenosis bilaterally.  RCRI  The patient meets 0-1 RCRI criteria and therefore has a less than 1% risk of major adverse cardiac complications.  METS  The patient's functional capacity capacity is greater than 4 METS.  EKG  EKG 2/19/24 NSR Possible left atrial enlargement rate 60. EKG 9/18/23 NSR Septal Infarct Rate64  Heart Failure  The patient has no known history of heart failure.  Additionally, the patient reports no symptoms of heart failure and demonstrates no signs of heart failure.  Hypertension Evaluation  The patient has a known history of hypertension that is controlled with medication.     CARDS EVAL  The patient was seen by Dr. Minor following ECHO on 10/3/23, can follow up in 1 year from date. Echocardiogram showed  normal LV systolic function without segmental wall motion abnormality.   ROSA score which indicates a 0.9% risk of intraoperative or 30-day postoperative.    Pulmonary   The patient has findings on chart review, clinical presentation and evaluation significant for suspected REGINO does not use machine. The patient is at increased risk of perioperative pulmonary complications secondary to REGINO, advanced age greater than 60.  The patient has a stop bang score of 3, which places patient at intermediate risk for having REGINO.    ARISCAT 16, low, 1.6% risk of in-hospital postoperative pulmonary complications  PRODIGY 21, high risk of respiratory depression episode.    Hematology  No diagnoses or significant findings on chart review or clinical presentation and evaluation.  Antiplatelet management   The patient is not currently receiving antiplatelet therapy.  Anticoagulation management  The patient is not currently receiving anticoagulation therapy.  Caprini score 12, high risk of perioperative VTE    Gastrointestinal  No diagnoses or significant findings on chart review or clinical presentation and evaluation.  Eat 10- 0,  self-perceived oropharyngeal dysphagia scale (0-40)     Genitourinary  No diagnoses or significant findings on chart review or clinical presentation and evaluation.    Renal  The patient has no known history of chronic kidney disease. The patient has specific risk factors associated with increased risk of perioperative renal complications due to age greater than 55, hypertension, diabetes mellitus.    Musculoskeletal  The patient has diagnoses or significant findings on chart review or clinical presentation and evaluation significant for osteoarthritis of left knee  Previous Right knee arthroplasty and left hip arthroplasty.   1/24/24 CT left knee  FINDINGS:  The patient is status post total left hip arthroplasty. There is no  sign of daphnie-implant fracture or suspicious lucency. The implant  appears well  aligned.      There are findings of tricompartmental osteoarthritis of the left  knee. Chondrocalcinosis is suspected at the medial and lateral  compartments. There is a small joint effusion. There is no CT  evidence of fracture or dislocation at the left knee.      Images through the ankle reveal no acute abnormality.          IMPRESSION:  The study is performed utilizing Nito protocol for preoperative  planning. See above  Endocrine  Diabetes Evaluation  The patient has history of diabetes mellitus controlled and not on medication, A1C 11/8/23 6.0  Thyroid Disease Evaluation  The patient has a history of thyroid disease that appears controlled on synthroid. TSH 9/15/23 2.46    ID  MRSA screening obtained. Instructions given for Hibiclens and Peridex. Prescription given for Peridex.    -Preoperative medication instructions were provided and reviewed with the patient.  Any additional testing or evaluation was explained to the patient.  NPO Instructions were discussed, and the patient's questions were answered prior to conclusion of this encounter

## 2024-02-19 NOTE — H&P (VIEW-ONLY)
CPM/PAT Evaluation       Name: Flower uLcas (Flower Lucas)  /Age: 1940/83 y.o.     In-Person       Chief Complaint: Evaluation prior to surgery    HPI  83 year old female scheduled for ROBB ARTHROPLASTY TOTAL KNEE - Left on 3/6/24 with Dr. Ramirez secondary to Primary osteoarthritis of left knee. PMHx includes HTN, HLD, DM, hypothyroid, Paroxysmal atrial flutter, PTSD, Anxiety and Insomnia. Presents to [facility] today for preoperative risk stratification and optimization.   Past Medical History:   Diagnosis Date    Abnormal finding of blood chemistry, unspecified 10/04/2016    Abnormal blood chemistry    Anxiety     Arrhythmia     Arthritis     Disorder of bone, unspecified 2016    Disorder of bone and articular cartilage    Disorder of bone, unspecified 2021    Humerus lesion, left    Displaced comminuted supracondylar fracture without intercondylar fracture of right humerus, initial encounter for closed fracture 2021    Closed displaced comminuted supracondylar fracture of right humerus without intercondylar fracture, initial encounter    Encounter for general adult medical examination without abnormal findings 2020    Medicare annual wellness visit, initial    Epistaxis 2022    Frequent epistaxis    Flatulence 2022    Excessive gas    Hypo-osmolality and hyponatremia 2016    Hyponatremia    Hypothyroidism     Other abnormality of red blood cells 2021    Elevated hematocrit    Other displaced fracture of upper end of left humerus, sequela 2021    Other closed displaced fracture of proximal end of left humerus, sequela    Pain in left hip 2021    Hip pain, left    Pain in left knee 2021    Knee pain, left    Pain in unspecified joint 2021    Pain in joint involving multiple sites    Personal history of diseases of the skin and subcutaneous tissue 2020    History of sebaceous cyst    Personal history of other diseases of the  circulatory system     History of hypertension    Personal history of other diseases of the digestive system 12/08/2015    History of constipation    Personal history of other diseases of the nervous system and sense organs     History of cataract    Personal history of other drug therapy 04/26/2016    History of pneumococcal vaccination    Personal history of other drug therapy 11/01/2021    History of influenza vaccination    Personal history of other drug therapy 10/27/2018    History of influenza vaccination    Personal history of other endocrine, nutritional and metabolic disease 03/08/2018    History of thyroid nodule    Personal history of other mental and behavioral disorders 08/05/2020    History of adjustment disorder    Personal history of other specified conditions 11/30/2020    History of insomnia    Personal history of other specified conditions 12/28/2020    History of fatigue    Polyp of colon 12/08/2015    Hyperplastic colon polyp    Presence of spectacles and contact lenses     Wears glasses    Sebaceous cyst 05/17/2018    Infected sebaceous cyst    Seizure disorder (CMS/HCC)     Sleep apnea     Unspecified fall, initial encounter 08/13/2021    Accidental fall, initial encounter       Past Surgical History:   Procedure Laterality Date    APPENDECTOMY      ruptured with peritonitis as a child    CATARACT EXTRACTION  09/04/2018    Cataract Surgery    COLONOSCOPY  12/08/2015    Complete Colonoscopy    HIP ARTHROPLASTY Left     MASTECTOMY Left 2004    with  reconstruction    OTHER SURGICAL HISTORY  02/25/2020    Mohs surgery nose    OTHER SURGICAL HISTORY Left     Humerus fracture repair    REVISION TOTAL HIP ARTHROPLASTY Left 07/19/2023    TOTAL KNEE ARTHROPLASTY Right     Knee Replacement       Patient Sexual activity questions deferred to the physician.    Family History   Problem Relation Name Age of Onset    Other (Malignant Neoplasm) Mother      Lung cancer Mother      Other (Laryngeal Cancer)  Father      Other (Malignant Neoplasm) Father         No Known Allergies    Prior to Admission medications    Medication Sig Start Date End Date Taking? Authorizing Provider   acetaminophen (Tylenol 8 HOUR) 650 mg ER tablet Take 1 tablet (650 mg) by mouth 2 times a day. Do not crush, chew, or split.    Historical Provider, MD   amLODIPine (Norvasc) 5 mg tablet Take 1 tablet (5 mg) by mouth once daily. 8/1/23   Bhavna Costa MD   ascorbic acid (Vitamin C) 500 mg tablet Take 1 tablet (500 mg) by mouth once every 24 hours.    Historical Provider, MD   biotin 5 mg capsule 1 cap(s) orally once a day    Historical Provider, MD   calcium carbonate-vitamin D3 600 mg-20 mcg (800 unit) tablet TAKE 1 TABLET Every twelve hours    Historical Provider, MD   cholecalciferol (Vitamin D-3) 1,250 mcg (50,000 unit) capsule TAKE 1 CAPSULE BY MOUTH ONCE A WEEK 10/8/18   Historical Provider, MD   cyanocobalamin (Vitamin B-12) 1,000 mcg tablet TAKE 1 TABLET DAILY AS DIRECTED. 4/26/16   Historical Provider, MD   denosumab (Prolia) 60 mg/mL syringe Inject 1 mL (60 mg) under the skin 1 time for 1 dose. 5/8/23 1/25/24  Bhavna Costa MD   DENOSUMAB SUBQ Inject under the skin. INJECT EVERY 6 MONTHS PER DIRECTED    Historical Provider, MD   docusate sodium (Colace) 100 mg capsule TAKE 1 CAPSULE BY MOUTH TWO TIMES A DAY AS NEEDED  Patient taking differently: Take 1 capsule (100 mg) by mouth 1 time. 7/19/23 7/18/24  Augustine Ramirez,    escitalopram (Lexapro) 10 mg tablet Take 1 tablet (10 mg) by mouth once daily. 1/29/24 7/27/24  Jon Jamil MD   hydroCHLOROthiazide (HYDRODiuril) 12.5 mg tablet Take 1 tablet (12.5 mg) by mouth once daily. 1/19/24   Vilma Ryder MD   lamoTRIgine (LaMICtal) 100 mg tablet Take 1 tablet (100 mg) by mouth 2 times a day. 2/12/24 4/12/24  Jon Jamil MD   levothyroxine (Synthroid, Levoxyl) 112 mcg tablet Take 1 tablet (112 mcg) by mouth once daily. 2/12/24   Jon Jamil MD   metoprolol succinate XL  (Toprol-XL) 25 mg 24 hr tablet Take 1 tablet by mouth once daily 1/6/24   Jon Jamil MD   oxybutynin (Ditropan) 5 mg tablet Take 1 tablet (5 mg) by mouth once daily. 10/4/23   Bhavna Costa MD   triamcinolone (Kenalog) 0.1 % ointment APPLY AND GENTLY MASSAGE INTO AFFECTED AREA(S) TWICE DAILY.    Historical Provider, MD   zonisamide (Zonegran) 100 mg capsule Take 1 capsule twice daily 9/14/16   Historical Provider, MD   desoximetasone (Topicort) 0.05 % topical gel APPLY AND GENTLY MASSAGE INTO AFFECTED AREA(S) TWICE DAILY. 12/29/12 2/19/24  Historical Provider, MD   gabapentin (Neurontin) 300 mg capsule  7/19/23 2/19/24  Historical Provider, MD   magnesium oxide (Mag-Ox) 400 mg (241.3 mg magnesium) tablet Magnesium Oxide 400 (240 Mg) MG Oral Tablet   Refills: 0       Active  2/19/24  Historical Provider, MD   melatonin 10 mg tablet Take 1 tablet (10 mg) by mouth once daily at bedtime. 12/5/18 2/19/24  Historical Provider, MD   traZODone (Desyrel) 50 mg tablet Take 1 tablet (50 mg) by mouth once daily at bedtime.  Patient not taking: Reported on 1/25/2024 11/8/23 2/19/24  Bhavna Costa MD        PAT ROS:   Constitutional:   neg    Neuro/Psych:   neg    Eyes:   neg    Ears:    Uses hearing aids   hearing loss  Nose:   Mouth:   neg    Throat:   neg    Neck:    Some stiffness with ROM   neck stiffness  Cardio:   neg    Respiratory:   neg    Endocrine:   GI:   neg    :   neg    Musculoskeletal:    arthralgias  Hematologic:   neg    Skin:      Physical Exam  Vitals reviewed.   Constitutional:       Appearance: Normal appearance.   HENT:      Head: Normocephalic and atraumatic.      Nose: Nose normal.      Mouth/Throat:      Mouth: Mucous membranes are moist.      Pharynx: Oropharynx is clear.   Eyes:      Pupils: Pupils are equal, round, and reactive to light.   Neck:      Vascular: Carotid bruit present.      Comments: Some stiffness with ROM  Cardiovascular:      Rate and Rhythm: Normal rate and regular rhythm.       Pulses: Normal pulses.      Heart sounds: Normal heart sounds.   Pulmonary:      Effort: Pulmonary effort is normal.      Breath sounds: Normal breath sounds.   Abdominal:      Palpations: Abdomen is soft.   Musculoskeletal:         General: Tenderness present.      Cervical back: Neck supple.      Comments: L knee   Skin:     General: Skin is warm and dry.      Capillary Refill: Capillary refill takes less than 2 seconds.   Neurological:      Mental Status: She is alert and oriented to person, place, and time.   Psychiatric:         Mood and Affect: Mood normal.         Behavior: Behavior normal.         Thought Content: Thought content normal.         Judgment: Judgment normal.          PAT AIRWAY:   Airway:     Mallampati::  II    TM distance::  >3 FB    Neck ROM::  Limited   Broken left lower tooth      There were no vitals taken for this visit.    DASI Risk Score    No data to display       Caprini DVT Assessment      Flowsheet Row Most Recent Value   DVT Score 12   Current Status Major surgery planned, including arthroscopic and laproscopic (1-2 hours), Elective major lower extremity arthroplasty   History Prior major surgery   Age Over 75 years   BMI 30 or less          Modified Frailty Index    No data to display       CHADS2 Stroke Risk  Current as of 13 minutes ago        N/A 3 - 100%: High Risk   2 - 3%: Medium Risk   0 - 2%: Low Risk     Last Change: N/A          This score determines the patient's risk of having a stroke if the patient has atrial fibrillation.        This score is not applicable to this patient. Components are not calculated.          Revised Cardiac Risk Index      Flowsheet Row Most Recent Value   Revised Cardiac Risk Calculator 0          Apfel Simplified Score      Flowsheet Row Most Recent Value   Apfel Simplified Score Calculator 3          Risk Analysis Index Results This Encounter    No data found in the last 1 encounters.         Assessment and Plan:     Anesthesia:  The  patient notes anesthesia complications in the past related to dizziness with anesthesia during previous total hip arthroplasty    Neuro:   The patient has a history of PTSD, Insomnia, Anxiety and Epilepsy on Desyrel and Lexapro. The patient is at increased risk for postoperative delirium secondary to age 65 or older. The patient is at increased risk for perioperative stroke secondary to hypertension , increased age, hyperlipidemia, female gender, diabetes mellitus.    HEENT/Airway  No diagnoses, significant findings on chart review, clinical presentation, or evaluation.    Cardiovascular  The patient is scheduled for non-cardiac surgery associated with elevated risk.    9/15/23 Vascular Duplex Cardio  DUPLEX IMAGES:     Right Carotid System: No hemodynamically significant stenosis.     Left Carotid System: No hemodynamically significant stenosis.     Patent vertebral arteries bilaterally with normal antegrade flow.     Interpretation is based upon the IAC recommendations which are the 2021  modified diagnostic criteria adopted from the 2003 Society of Radiologist  and Ultrasound Consensus Conference.     IMPRESSION:  Carotid ultrasound shows no evidence for hemodynamically significant  stenosis bilaterally.  RCRI  The patient meets 0-1 RCRI criteria and therefore has a less than 1% risk of major adverse cardiac complications.  METS  The patient's functional capacity capacity is greater than 4 METS.  EKG  EKG 2/19/24 NSR Possible left atrial enlargement rate 60. EKG 9/18/23 NSR Septal Infarct Rate64  Heart Failure  The patient has no known history of heart failure.  Additionally, the patient reports no symptoms of heart failure and demonstrates no signs of heart failure.  Hypertension Evaluation  The patient has a known history of hypertension that is controlled with medication.     CARDS EVAL  The patient was seen by Dr. Minor following ECHO on 10/3/23, can follow up in 1 year from date. Echocardiogram showed  normal LV systolic function without segmental wall motion abnormality.   ROSA score which indicates a 0.9% risk of intraoperative or 30-day postoperative.    Pulmonary   The patient has findings on chart review, clinical presentation and evaluation significant for suspected REGINO does not use machine. The patient is at increased risk of perioperative pulmonary complications secondary to REGINO, advanced age greater than 60.  The patient has a stop bang score of 3, which places patient at intermediate risk for having REGINO.    ARISCAT 16, low, 1.6% risk of in-hospital postoperative pulmonary complications  PRODIGY 21, high risk of respiratory depression episode.    Hematology  No diagnoses or significant findings on chart review or clinical presentation and evaluation.  Antiplatelet management   The patient is not currently receiving antiplatelet therapy.  Anticoagulation management  The patient is not currently receiving anticoagulation therapy.  Caprini score 12, high risk of perioperative VTE    Gastrointestinal  No diagnoses or significant findings on chart review or clinical presentation and evaluation.  Eat 10- 0,  self-perceived oropharyngeal dysphagia scale (0-40)     Genitourinary  No diagnoses or significant findings on chart review or clinical presentation and evaluation.    Renal  The patient has no known history of chronic kidney disease. The patient has specific risk factors associated with increased risk of perioperative renal complications due to age greater than 55, hypertension, diabetes mellitus.    Musculoskeletal  The patient has diagnoses or significant findings on chart review or clinical presentation and evaluation significant for osteoarthritis of left knee  Previous Right knee arthroplasty and left hip arthroplasty.   1/24/24 CT left knee  FINDINGS:  The patient is status post total left hip arthroplasty. There is no  sign of daphnie-implant fracture or suspicious lucency. The implant  appears well  aligned.      There are findings of tricompartmental osteoarthritis of the left  knee. Chondrocalcinosis is suspected at the medial and lateral  compartments. There is a small joint effusion. There is no CT  evidence of fracture or dislocation at the left knee.      Images through the ankle reveal no acute abnormality.          IMPRESSION:  The study is performed utilizing Nito protocol for preoperative  planning. See above  Endocrine  Diabetes Evaluation  The patient has history of diabetes mellitus controlled and not on medication, A1C 11/8/23 6.0  Thyroid Disease Evaluation  The patient has a history of thyroid disease that appears controlled on synthroid. TSH 9/15/23 2.46    ID  MRSA screening obtained. Instructions given for Hibiclens and Peridex. Prescription given for Peridex.    -Preoperative medication instructions were provided and reviewed with the patient.  Any additional testing or evaluation was explained to the patient.  NPO Instructions were discussed, and the patient's questions were answered prior to conclusion of this encounter

## 2024-02-20 ENCOUNTER — APPOINTMENT (OUTPATIENT)
Dept: PREADMISSION TESTING | Facility: HOSPITAL | Age: 84
End: 2024-02-20
Payer: MEDICARE

## 2024-02-21 LAB — STAPHYLOCOCCUS SPEC CULT: ABNORMAL

## 2024-02-22 ENCOUNTER — LAB (OUTPATIENT)
Dept: LAB | Facility: LAB | Age: 84
End: 2024-02-22
Payer: MEDICARE

## 2024-02-22 DIAGNOSIS — M81.0 AGE-RELATED OSTEOPOROSIS WITHOUT CURRENT PATHOLOGICAL FRACTURE: Primary | ICD-10-CM

## 2024-02-22 LAB
CA-I BLD-SCNC: 1.24 MMOL/L (ref 1.1–1.33)
CREAT SERPL-MCNC: 1.04 MG/DL (ref 0.5–1.05)
EGFRCR SERPLBLD CKD-EPI 2021: 53 ML/MIN/1.73M*2

## 2024-02-22 PROCEDURE — 36415 COLL VENOUS BLD VENIPUNCTURE: CPT

## 2024-02-22 PROCEDURE — 82565 ASSAY OF CREATININE: CPT

## 2024-02-22 PROCEDURE — 82330 ASSAY OF CALCIUM: CPT

## 2024-02-23 DIAGNOSIS — R79.9 ABNORMAL BLOOD CHEMISTRY: ICD-10-CM

## 2024-02-27 ENCOUNTER — INFUSION (OUTPATIENT)
Dept: INFUSION THERAPY | Facility: CLINIC | Age: 84
End: 2024-02-27
Payer: MEDICARE

## 2024-02-27 ENCOUNTER — LAB (OUTPATIENT)
Dept: LAB | Facility: LAB | Age: 84
End: 2024-02-27
Payer: MEDICARE

## 2024-02-27 VITALS
TEMPERATURE: 98.8 F | HEART RATE: 66 BPM | OXYGEN SATURATION: 97 % | SYSTOLIC BLOOD PRESSURE: 153 MMHG | DIASTOLIC BLOOD PRESSURE: 76 MMHG | RESPIRATION RATE: 18 BRPM

## 2024-02-27 DIAGNOSIS — R79.9 ABNORMAL BLOOD CHEMISTRY: ICD-10-CM

## 2024-02-27 DIAGNOSIS — E03.9 HYPOTHYROIDISM, UNSPECIFIED TYPE: ICD-10-CM

## 2024-02-27 DIAGNOSIS — M81.0 OSTEOPOROSIS, UNSPECIFIED OSTEOPOROSIS TYPE, UNSPECIFIED PATHOLOGICAL FRACTURE PRESENCE: ICD-10-CM

## 2024-02-27 DIAGNOSIS — I10 HYPERTENSION, UNSPECIFIED TYPE: ICD-10-CM

## 2024-02-27 LAB
CREAT SERPL-MCNC: 0.8 MG/DL (ref 0.5–1.05)
EGFRCR SERPLBLD CKD-EPI 2021: 73 ML/MIN/1.73M*2

## 2024-02-27 PROCEDURE — 96372 THER/PROPH/DIAG INJ SC/IM: CPT | Performed by: INTERNAL MEDICINE

## 2024-02-27 PROCEDURE — 2500000004 HC RX 250 GENERAL PHARMACY W/ HCPCS (ALT 636 FOR OP/ED): Mod: JZ | Performed by: INTERNAL MEDICINE

## 2024-02-27 PROCEDURE — 82565 ASSAY OF CREATININE: CPT

## 2024-02-27 PROCEDURE — 36415 COLL VENOUS BLD VENIPUNCTURE: CPT

## 2024-02-27 RX ORDER — FAMOTIDINE 10 MG/ML
20 INJECTION INTRAVENOUS ONCE AS NEEDED
OUTPATIENT
Start: 2024-08-25

## 2024-02-27 RX ORDER — EPINEPHRINE 0.3 MG/.3ML
0.3 INJECTION SUBCUTANEOUS EVERY 5 MIN PRN
OUTPATIENT
Start: 2024-08-25

## 2024-02-27 RX ORDER — ALBUTEROL SULFATE 0.83 MG/ML
3 SOLUTION RESPIRATORY (INHALATION) AS NEEDED
OUTPATIENT
Start: 2024-08-25

## 2024-02-27 RX ORDER — DIPHENHYDRAMINE HYDROCHLORIDE 50 MG/ML
50 INJECTION INTRAMUSCULAR; INTRAVENOUS AS NEEDED
OUTPATIENT
Start: 2024-08-25

## 2024-02-27 RX ADMIN — DENOSUMAB 60 MG: 60 INJECTION SUBCUTANEOUS at 10:15

## 2024-02-27 NOTE — PROGRESS NOTES
[facility]   infusion Clinic Note   Date: 2024   Name: Flower Lucas  : 1940   MRN: [id number]         Reason for Visit: No chief complaint on file.         Visit Type: INJECTION       Ordered By: Acer      Accompanied by:Self      Diagnosis: No diagnosis found.       Allergies:   Allergies as of 2024    (No Known Allergies)         Current Medications has a current medication list which includes the following prescription(s): acetaminophen, amlodipine, ascorbic acid, biotin, calcium carbonate-vitamin d3, cholecalciferol, cyanocobalamin, prolia, denosumab, docusate sodium, escitalopram, hydrochlorothiazide, lamotrigine, levothyroxine, metoprolol succinate xl, oxybutynin, triamcinolone, and zonisamide.       Vitals:   There were no vitals filed for this visit.          Infusion Pre-procedure Checklist:   - Allergies reviewed: yes   - Medications reviewed: yes       - Previous reaction to current treatment: no      Assess patient for the concerns below. Document provider notification as appropriate.  - Active or recent infection with/without current antibiotic use: no  - Recent or planned invasive dental work: no  - Recent or planned surgeries: yes  - Recently received or plans to receive vaccinations: no  - Has treatment related toxicities: no  - Is pregnant:  no      Pain: 4   - Is the pain different from normal: no   - Is the pain tolerable: yes   - Is your Doctor aware:  yes      Labs: N/A         Fall Risk Screening:         Review Of Systems:  Review of Systems   All other systems reviewed and are negative.        Infusion Readiness:   - Assessment Concerns Related to Infusion: No  - Provider notified: no      Document Below Only If Indicated:   New Patient Education:    N/A (returning patient for continuation of therapy. Ongoing education provided as needed.)        Treatment Conditions & Drug Specific Questions:    Denosumab  (PROLIA. XGEVA)    (Unless  otherwise specified on patient specific therapy plan):     TREATMENT CONDITIONS:  Unless otherwise specified on patient specific therapy plan HOLD and notify provider prior to proceeding with today's injection if patients:  o Calcium is LESS THAN 8.6 mg/dL OR  Ionized Calcium LESS THAN 1.1 mmol/L  o Recent or planned invasive dental procedure (within 4 weeks)    Lab Results   Component Value Date    CALCIUM 9.3 02/19/2024    PHOS 3.3 07/19/2023      Lab Results   Component Value Date    CAION 1.24 02/22/2024       Labs reviewed and patient meets treatment conditions? Yes    DRUG SPECIFIC QUESTIONS:  Is the patient taking calcium and vitamin D? Yes  (Recommended)    Pt Instructed on following risks: (1) hypocalcemia, (2) osteonecrosis of the jaw, (3) atypical femoral fractures, (4) serious infections, and (5) dermatologic reactions?  Yes      REMINDER:  PREGNANCY CATEGORY X DRUG. OBTAIN NEGTATIVE PREGNANCY TEST PRIOR TO FIRST INFUSION FOR WOMEN OF CHILDBEARING ABILITY   REMS DRUG    Recommended Vitals/Observation:  Vitals: Obtain vitals prior to injection.  Observation: Patient may leave immediately following injection.        Weight Based Drug Calculations:    WEIGHT BASED DRUGS: NOT APPLICABLE / FLAT DOSE          Initiated By: Yris Florez RN   Time: 9:57 AM     Flower Lucas had no medications administered during this visit. Patient visit conducted today by Yris Florez RN for administration of Prolia Subcutaneous injection administered in right arm(s) and well-tolerated.

## 2024-02-27 NOTE — PATIENT INSTRUCTIONS
Today :Flower Lucas had no medications administered during this visit.     For:   1. Osteoporosis, unspecified osteoporosis type, unspecified pathological fracture presence         Your next appointment is due in:  8/27/24        Please read the  Medication Guide that was given to you and reviewed during todays visit.     (Tell all doctors including dentists that you are taking this medication)     Go to the emergency room or call 911 if:  -You have signs of allergic reaction:   -Rash, hives, itching.   -Swollen, blistered, peeling skin.   -Swelling of face, lips, mouth, tongue or throat.   -Tightness of chest, trouble breathing, swallowing or talking     Call your doctor:  - If IV / injection site gets red, warm, swollen, itchy or leaks fluid or pus.     (Leave dressing on your IV site for at least 2 hours and keep area clean and dry  - If you get sick or have symptoms of infection or are not feeling well for any reason.    (Wash your hands often, stay away from people who are sick)  - If you have side effects from your medication that do not go away or are bothersome.     (Refer to the teaching your nurse gave you for side effects to call your doctor about)    - Common side effects may include:  stuffy nose, headache, feeling tired, muscle aches, upset stomach  - Before receiving any vaccines     - Call the [facility] at   If:  - You get sick, are on antibiotics, have had a recent vaccine, have surgery or dental work and your doctor wants your visit rescheduled.  - You need to cancel and reschedule your visit for any reason. Call at least 2 days before your visit if you need to cancel.   - Your insurance changes before your next visit.    (We will need to get approval from your new insurance. This can take up to two weeks.)     The [facility] is opened Monday thru Friday. We are closed on weekends and holidays.   Voice mail will take your call if the center is closed. If you  leave a message please allow 24 hours for a call back during weekdays. If you leave a message on a weekend/holiday, we will call you back the next business day.

## 2024-02-28 RX ORDER — AMLODIPINE BESYLATE 5 MG/1
5 TABLET ORAL DAILY
Qty: 90 TABLET | Refills: 1 | Status: SHIPPED | OUTPATIENT
Start: 2024-02-28

## 2024-03-04 ENCOUNTER — ANESTHESIA EVENT (OUTPATIENT)
Dept: OPERATING ROOM | Facility: HOSPITAL | Age: 84
End: 2024-03-04
Payer: MEDICARE

## 2024-03-05 ENCOUNTER — TELEPHONE (OUTPATIENT)
Dept: INPATIENT UNIT | Facility: HOSPITAL | Age: 84
End: 2024-03-05
Payer: MEDICARE

## 2024-03-06 ENCOUNTER — HOSPITAL ENCOUNTER (OUTPATIENT)
Facility: HOSPITAL | Age: 84
Discharge: HOME | End: 2024-03-07
Attending: ORTHOPAEDIC SURGERY | Admitting: ORTHOPAEDIC SURGERY
Payer: MEDICARE

## 2024-03-06 ENCOUNTER — ANESTHESIA (OUTPATIENT)
Dept: OPERATING ROOM | Facility: HOSPITAL | Age: 84
End: 2024-03-06
Payer: MEDICARE

## 2024-03-06 ENCOUNTER — APPOINTMENT (OUTPATIENT)
Dept: RADIOLOGY | Facility: HOSPITAL | Age: 84
End: 2024-03-06
Payer: MEDICARE

## 2024-03-06 DIAGNOSIS — Z96.652 STATUS POST TOTAL KNEE REPLACEMENT, LEFT: ICD-10-CM

## 2024-03-06 DIAGNOSIS — Z01.818 PRE-OP EXAMINATION: Primary | ICD-10-CM

## 2024-03-06 LAB
ABO GROUP (TYPE) IN BLOOD: NORMAL
RH FACTOR (ANTIGEN D): NORMAL

## 2024-03-06 PROCEDURE — C1776 JOINT DEVICE (IMPLANTABLE): HCPCS | Performed by: ORTHOPAEDIC SURGERY

## 2024-03-06 PROCEDURE — 2500000004 HC RX 250 GENERAL PHARMACY W/ HCPCS (ALT 636 FOR OP/ED): Performed by: STUDENT IN AN ORGANIZED HEALTH CARE EDUCATION/TRAINING PROGRAM

## 2024-03-06 PROCEDURE — 7100000002 HC RECOVERY ROOM TIME - EACH INCREMENTAL 1 MINUTE: Performed by: ORTHOPAEDIC SURGERY

## 2024-03-06 PROCEDURE — 2500000005 HC RX 250 GENERAL PHARMACY W/O HCPCS: Performed by: NURSE ANESTHETIST, CERTIFIED REGISTERED

## 2024-03-06 PROCEDURE — C1713 ANCHOR/SCREW BN/BN,TIS/BN: HCPCS | Performed by: ORTHOPAEDIC SURGERY

## 2024-03-06 PROCEDURE — 7100000001 HC RECOVERY ROOM TIME - INITIAL BASE CHARGE: Performed by: ORTHOPAEDIC SURGERY

## 2024-03-06 PROCEDURE — 73560 X-RAY EXAM OF KNEE 1 OR 2: CPT | Mod: LT

## 2024-03-06 PROCEDURE — 3600000017 HC OR TIME - EACH INCREMENTAL 1 MINUTE - PROCEDURE LEVEL SIX: Performed by: ORTHOPAEDIC SURGERY

## 2024-03-06 PROCEDURE — 2500000004 HC RX 250 GENERAL PHARMACY W/ HCPCS (ALT 636 FOR OP/ED): Performed by: ANESTHESIOLOGY

## 2024-03-06 PROCEDURE — A6213 FOAM DRG >16<=48 SQ IN W/BDR: HCPCS | Performed by: ORTHOPAEDIC SURGERY

## 2024-03-06 PROCEDURE — 99222 1ST HOSP IP/OBS MODERATE 55: CPT | Performed by: NURSE PRACTITIONER

## 2024-03-06 PROCEDURE — 2500000002 HC RX 250 W HCPCS SELF ADMINISTERED DRUGS (ALT 637 FOR MEDICARE OP, ALT 636 FOR OP/ED)

## 2024-03-06 PROCEDURE — 3700000002 HC GENERAL ANESTHESIA TIME - EACH INCREMENTAL 1 MINUTE: Performed by: ORTHOPAEDIC SURGERY

## 2024-03-06 PROCEDURE — 36415 COLL VENOUS BLD VENIPUNCTURE: CPT | Performed by: ORTHOPAEDIC SURGERY

## 2024-03-06 PROCEDURE — 2500000004 HC RX 250 GENERAL PHARMACY W/ HCPCS (ALT 636 FOR OP/ED)

## 2024-03-06 PROCEDURE — 2500000004 HC RX 250 GENERAL PHARMACY W/ HCPCS (ALT 636 FOR OP/ED): Performed by: NURSE ANESTHETIST, CERTIFIED REGISTERED

## 2024-03-06 PROCEDURE — 73560 X-RAY EXAM OF KNEE 1 OR 2: CPT | Mod: LEFT SIDE | Performed by: RADIOLOGY

## 2024-03-06 PROCEDURE — 2500000004 HC RX 250 GENERAL PHARMACY W/ HCPCS (ALT 636 FOR OP/ED): Performed by: ORTHOPAEDIC SURGERY

## 2024-03-06 PROCEDURE — 3600000018 HC OR TIME - INITIAL BASE CHARGE - PROCEDURE LEVEL SIX: Performed by: ORTHOPAEDIC SURGERY

## 2024-03-06 PROCEDURE — A27447 PR TOTAL KNEE ARTHROPLASTY: Performed by: NURSE ANESTHETIST, CERTIFIED REGISTERED

## 2024-03-06 PROCEDURE — 2500000001 HC RX 250 WO HCPCS SELF ADMINISTERED DRUGS (ALT 637 FOR MEDICARE OP)

## 2024-03-06 PROCEDURE — 7100000011 HC EXTENDED STAY RECOVERY HOURLY - NURSING UNIT

## 2024-03-06 PROCEDURE — 2720000007 HC OR 272 NO HCPCS: Performed by: ORTHOPAEDIC SURGERY

## 2024-03-06 PROCEDURE — 94760 N-INVAS EAR/PLS OXIMETRY 1: CPT

## 2024-03-06 PROCEDURE — 2780000003 HC OR 278 NO HCPCS: Performed by: ORTHOPAEDIC SURGERY

## 2024-03-06 PROCEDURE — 2500000005 HC RX 250 GENERAL PHARMACY W/O HCPCS: Performed by: STUDENT IN AN ORGANIZED HEALTH CARE EDUCATION/TRAINING PROGRAM

## 2024-03-06 PROCEDURE — 3700000001 HC GENERAL ANESTHESIA TIME - INITIAL BASE CHARGE: Performed by: ORTHOPAEDIC SURGERY

## 2024-03-06 PROCEDURE — A4217 STERILE WATER/SALINE, 500 ML: HCPCS | Performed by: ORTHOPAEDIC SURGERY

## 2024-03-06 PROCEDURE — 64447 NJX AA&/STRD FEMORAL NRV IMG: CPT | Performed by: NURSE ANESTHETIST, CERTIFIED REGISTERED

## 2024-03-06 DEVICE — IMPLANTABLE DEVICE: Type: IMPLANTABLE DEVICE | Site: KNEE | Status: FUNCTIONAL

## 2024-03-06 DEVICE — SIMPLEX® HV WITH GENTAMICIN IS A FAST-SETTING ACRYLIC RESIN WITH ADDITION OF GENTAMICIN SULFATE FOR USE IN BONE SURGERY. MIXING THE TWO SEPARATE STERILE COMPONENTS PRODUCES A DUCTILE BONE CEMENT WHICH, AFTER HARDENING, FIXES THE IMPLANT AND TRANSFERS STRESSES PRODUCED DURING MOVEMENT EVENLY TO THE BONE. SIMPLEX® HV WITH GENTAMICINN CEMENT POWDER ALSO CONTAINS INSOLUBLE ZIRCONIUM DIOXIDE AS AN X-RAY CONTRAST MEDIUM. SIMPLEX® HV WITH GENTAMICIN DOES NOT EMIT A SIGNAL AND DOES NOT POSE A SAFETY RISK IN A MAGNETIC RESONANCE ENVIRONMENT.
Type: IMPLANTABLE DEVICE | Site: KNEE | Status: FUNCTIONAL
Brand: SIMPLEX HV WITH GENTAMICIN

## 2024-03-06 DEVICE — PATELLA, TRIATHLON, ASYMMETRIC X3, SZ-A32 10MM: Type: IMPLANTABLE DEVICE | Site: PATELLA | Status: FUNCTIONAL

## 2024-03-06 DEVICE — PLATE, TIBIAL TRIATH PRI CEM FXD BPLT P5: Type: IMPLANTABLE DEVICE | Site: KNEE | Status: FUNCTIONAL

## 2024-03-06 RX ORDER — OXYCODONE AND ACETAMINOPHEN 5; 325 MG/1; MG/1
1 TABLET ORAL EVERY 6 HOURS PRN
Qty: 5 TABLET | Refills: 0 | COMMUNITY
Start: 2024-03-06 | End: 2024-03-22 | Stop reason: ALTCHOICE

## 2024-03-06 RX ORDER — OXYCODONE HYDROCHLORIDE 5 MG/1
10 TABLET ORAL EVERY 4 HOURS PRN
Status: DISCONTINUED | OUTPATIENT
Start: 2024-03-06 | End: 2024-03-06 | Stop reason: HOSPADM

## 2024-03-06 RX ORDER — OXYCODONE HYDROCHLORIDE 5 MG/1
5 TABLET ORAL EVERY 4 HOURS PRN
Status: DISCONTINUED | OUTPATIENT
Start: 2024-03-06 | End: 2024-03-06 | Stop reason: HOSPADM

## 2024-03-06 RX ORDER — GABAPENTIN 300 MG/1
300 CAPSULE ORAL 3 TIMES DAILY PRN
Status: DISCONTINUED | OUTPATIENT
Start: 2024-03-06 | End: 2024-03-07 | Stop reason: HOSPADM

## 2024-03-06 RX ORDER — NALOXONE HYDROCHLORIDE 0.4 MG/ML
0.2 INJECTION, SOLUTION INTRAMUSCULAR; INTRAVENOUS; SUBCUTANEOUS EVERY 5 MIN PRN
Status: DISCONTINUED | OUTPATIENT
Start: 2024-03-06 | End: 2024-03-07 | Stop reason: HOSPADM

## 2024-03-06 RX ORDER — DOCUSATE SODIUM 100 MG/1
100 CAPSULE, LIQUID FILLED ORAL 2 TIMES DAILY PRN
COMMUNITY
Start: 2024-03-06

## 2024-03-06 RX ORDER — ONDANSETRON 4 MG/1
4 TABLET, ORALLY DISINTEGRATING ORAL EVERY 8 HOURS PRN
Status: DISCONTINUED | OUTPATIENT
Start: 2024-03-06 | End: 2024-03-07 | Stop reason: HOSPADM

## 2024-03-06 RX ORDER — LIDOCAINE HYDROCHLORIDE 10 MG/ML
0.1 INJECTION, SOLUTION EPIDURAL; INFILTRATION; INTRACAUDAL; PERINEURAL ONCE
Status: DISCONTINUED | OUTPATIENT
Start: 2024-03-06 | End: 2024-03-06 | Stop reason: HOSPADM

## 2024-03-06 RX ORDER — MORPHINE SULFATE 2 MG/ML
2 INJECTION, SOLUTION INTRAMUSCULAR; INTRAVENOUS
Status: DISCONTINUED | OUTPATIENT
Start: 2024-03-06 | End: 2024-03-07 | Stop reason: HOSPADM

## 2024-03-06 RX ORDER — SODIUM CHLORIDE, SODIUM LACTATE, POTASSIUM CHLORIDE, CALCIUM CHLORIDE 600; 310; 30; 20 MG/100ML; MG/100ML; MG/100ML; MG/100ML
100 INJECTION, SOLUTION INTRAVENOUS CONTINUOUS
Status: DISCONTINUED | OUTPATIENT
Start: 2024-03-06 | End: 2024-03-06 | Stop reason: HOSPADM

## 2024-03-06 RX ORDER — KETOROLAC TROMETHAMINE 15 MG/ML
15 INJECTION, SOLUTION INTRAMUSCULAR; INTRAVENOUS EVERY 6 HOURS
Status: DISPENSED | OUTPATIENT
Start: 2024-03-06 | End: 2024-03-07

## 2024-03-06 RX ORDER — BUPIVACAINE HYDROCHLORIDE 7.5 MG/ML
INJECTION, SOLUTION INTRASPINAL AS NEEDED
Status: DISCONTINUED | OUTPATIENT
Start: 2024-03-06 | End: 2024-03-06

## 2024-03-06 RX ORDER — HYDROCHLOROTHIAZIDE 25 MG/1
12.5 TABLET ORAL DAILY
Status: DISCONTINUED | OUTPATIENT
Start: 2024-03-06 | End: 2024-03-07 | Stop reason: HOSPADM

## 2024-03-06 RX ORDER — SODIUM CHLORIDE, SODIUM LACTATE, POTASSIUM CHLORIDE, CALCIUM CHLORIDE 600; 310; 30; 20 MG/100ML; MG/100ML; MG/100ML; MG/100ML
100 INJECTION, SOLUTION INTRAVENOUS CONTINUOUS
Status: DISCONTINUED | OUTPATIENT
Start: 2024-03-06 | End: 2024-03-07 | Stop reason: HOSPADM

## 2024-03-06 RX ORDER — ZONISAMIDE 100 MG/1
100 CAPSULE ORAL 2 TIMES DAILY
Status: DISCONTINUED | OUTPATIENT
Start: 2024-03-06 | End: 2024-03-07 | Stop reason: HOSPADM

## 2024-03-06 RX ORDER — DIPHENHYDRAMINE HYDROCHLORIDE 50 MG/ML
INJECTION INTRAMUSCULAR; INTRAVENOUS AS NEEDED
Status: DISCONTINUED | OUTPATIENT
Start: 2024-03-06 | End: 2024-03-06

## 2024-03-06 RX ORDER — ASPIRIN 325 MG
325 TABLET, DELAYED RELEASE (ENTERIC COATED) ORAL 2 TIMES DAILY
Status: DISCONTINUED | OUTPATIENT
Start: 2024-03-07 | End: 2024-03-07 | Stop reason: HOSPADM

## 2024-03-06 RX ORDER — LAMOTRIGINE 100 MG/1
100 TABLET ORAL 2 TIMES DAILY
Status: DISCONTINUED | OUTPATIENT
Start: 2024-03-06 | End: 2024-03-07 | Stop reason: HOSPADM

## 2024-03-06 RX ORDER — CELECOXIB 200 MG/1
1 CAPSULE ORAL 2 TIMES DAILY PRN
COMMUNITY
Start: 2024-03-06 | End: 2024-06-03 | Stop reason: WASHOUT

## 2024-03-06 RX ORDER — GABAPENTIN 300 MG/1
1 CAPSULE ORAL 3 TIMES DAILY PRN
COMMUNITY
Start: 2024-03-06 | End: 2024-06-03 | Stop reason: WASHOUT

## 2024-03-06 RX ORDER — OXYCODONE HYDROCHLORIDE 5 MG/1
5 TABLET ORAL EVERY 6 HOURS PRN
Status: DISCONTINUED | OUTPATIENT
Start: 2024-03-06 | End: 2024-03-07 | Stop reason: HOSPADM

## 2024-03-06 RX ORDER — MIDAZOLAM HYDROCHLORIDE 1 MG/ML
INJECTION INTRAMUSCULAR; INTRAVENOUS AS NEEDED
Status: DISCONTINUED | OUTPATIENT
Start: 2024-03-06 | End: 2024-03-06

## 2024-03-06 RX ORDER — CEFAZOLIN 1 G/1
INJECTION, POWDER, FOR SOLUTION INTRAVENOUS AS NEEDED
Status: DISCONTINUED | OUTPATIENT
Start: 2024-03-06 | End: 2024-03-06

## 2024-03-06 RX ORDER — OXYCODONE HYDROCHLORIDE 10 MG/1
10 TABLET ORAL EVERY 6 HOURS PRN
Status: DISCONTINUED | OUTPATIENT
Start: 2024-03-06 | End: 2024-03-07 | Stop reason: HOSPADM

## 2024-03-06 RX ORDER — AMLODIPINE BESYLATE 5 MG/1
5 TABLET ORAL DAILY
Status: DISCONTINUED | OUTPATIENT
Start: 2024-03-06 | End: 2024-03-07 | Stop reason: HOSPADM

## 2024-03-06 RX ORDER — PROPOFOL 10 MG/ML
INJECTION, EMULSION INTRAVENOUS AS NEEDED
Status: DISCONTINUED | OUTPATIENT
Start: 2024-03-06 | End: 2024-03-06

## 2024-03-06 RX ORDER — ESCITALOPRAM OXALATE 10 MG/1
10 TABLET ORAL DAILY
Status: DISCONTINUED | OUTPATIENT
Start: 2024-03-06 | End: 2024-03-07 | Stop reason: HOSPADM

## 2024-03-06 RX ORDER — TRAMADOL HYDROCHLORIDE 50 MG/1
50 TABLET ORAL EVERY 8 HOURS PRN
Status: DISCONTINUED | OUTPATIENT
Start: 2024-03-06 | End: 2024-03-07 | Stop reason: HOSPADM

## 2024-03-06 RX ORDER — METOPROLOL TARTRATE 1 MG/ML
INJECTION, SOLUTION INTRAVENOUS AS NEEDED
Status: DISCONTINUED | OUTPATIENT
Start: 2024-03-06 | End: 2024-03-06

## 2024-03-06 RX ORDER — FENTANYL CITRATE 50 UG/ML
INJECTION, SOLUTION INTRAMUSCULAR; INTRAVENOUS AS NEEDED
Status: DISCONTINUED | OUTPATIENT
Start: 2024-03-06 | End: 2024-03-06

## 2024-03-06 RX ORDER — ASCORBIC ACID 500 MG
500 TABLET ORAL 2 TIMES DAILY
COMMUNITY
Start: 2024-03-06

## 2024-03-06 RX ORDER — ONDANSETRON HYDROCHLORIDE 2 MG/ML
INJECTION, SOLUTION INTRAVENOUS AS NEEDED
Status: DISCONTINUED | OUTPATIENT
Start: 2024-03-06 | End: 2024-03-06

## 2024-03-06 RX ORDER — ONDANSETRON HYDROCHLORIDE 2 MG/ML
4 INJECTION, SOLUTION INTRAVENOUS EVERY 8 HOURS PRN
Status: DISCONTINUED | OUTPATIENT
Start: 2024-03-06 | End: 2024-03-07 | Stop reason: HOSPADM

## 2024-03-06 RX ORDER — ACETAMINOPHEN 325 MG/1
975 TABLET ORAL EVERY 6 HOURS SCHEDULED
Status: DISCONTINUED | OUTPATIENT
Start: 2024-03-06 | End: 2024-03-07 | Stop reason: HOSPADM

## 2024-03-06 RX ORDER — CEFAZOLIN SODIUM 2 G/100ML
2 INJECTION, SOLUTION INTRAVENOUS EVERY 8 HOURS
Status: COMPLETED | OUTPATIENT
Start: 2024-03-06 | End: 2024-03-07

## 2024-03-06 RX ORDER — DOCUSATE SODIUM 100 MG/1
100 CAPSULE, LIQUID FILLED ORAL 2 TIMES DAILY
Status: DISCONTINUED | OUTPATIENT
Start: 2024-03-06 | End: 2024-03-07 | Stop reason: HOSPADM

## 2024-03-06 RX ORDER — DEXAMETHASONE SODIUM PHOSPHATE 4 MG/ML
INJECTION, SOLUTION INTRA-ARTICULAR; INTRALESIONAL; INTRAMUSCULAR; INTRAVENOUS; SOFT TISSUE AS NEEDED
Status: DISCONTINUED | OUTPATIENT
Start: 2024-03-06 | End: 2024-03-06

## 2024-03-06 RX ORDER — CARISOPRODOL 350 MG/1
350 TABLET ORAL 3 TIMES DAILY PRN
Status: DISCONTINUED | OUTPATIENT
Start: 2024-03-06 | End: 2024-03-07 | Stop reason: HOSPADM

## 2024-03-06 RX ORDER — CARISOPRODOL 350 MG/1
350 TABLET ORAL 3 TIMES DAILY PRN
Refills: 0 | COMMUNITY
Start: 2024-03-06

## 2024-03-06 RX ORDER — TRANEXAMIC ACID 10 MG/ML
INJECTION, SOLUTION INTRAVENOUS AS NEEDED
Status: DISCONTINUED | OUTPATIENT
Start: 2024-03-06 | End: 2024-03-06

## 2024-03-06 RX ORDER — OXYBUTYNIN CHLORIDE 5 MG/1
5 TABLET ORAL DAILY
Status: DISCONTINUED | OUTPATIENT
Start: 2024-03-06 | End: 2024-03-07 | Stop reason: HOSPADM

## 2024-03-06 RX ORDER — PROPOFOL 10 MG/ML
INJECTION, EMULSION INTRAVENOUS CONTINUOUS PRN
Status: DISCONTINUED | OUTPATIENT
Start: 2024-03-06 | End: 2024-03-06

## 2024-03-06 RX ORDER — ONDANSETRON HYDROCHLORIDE 2 MG/ML
4 INJECTION, SOLUTION INTRAVENOUS ONCE AS NEEDED
Status: COMPLETED | OUTPATIENT
Start: 2024-03-06 | End: 2024-03-06

## 2024-03-06 RX ORDER — LEVOTHYROXINE SODIUM 112 UG/1
112 TABLET ORAL DAILY
Status: DISCONTINUED | OUTPATIENT
Start: 2024-03-07 | End: 2024-03-07 | Stop reason: HOSPADM

## 2024-03-06 RX ORDER — DROPERIDOL 2.5 MG/ML
0.62 INJECTION, SOLUTION INTRAMUSCULAR; INTRAVENOUS ONCE AS NEEDED
Status: DISCONTINUED | OUTPATIENT
Start: 2024-03-06 | End: 2024-03-06 | Stop reason: HOSPADM

## 2024-03-06 RX ORDER — SODIUM CHLORIDE 0.9 G/100ML
IRRIGANT IRRIGATION AS NEEDED
Status: DISCONTINUED | OUTPATIENT
Start: 2024-03-06 | End: 2024-03-06 | Stop reason: HOSPADM

## 2024-03-06 RX ORDER — DIPHENHYDRAMINE HYDROCHLORIDE 50 MG/ML
12.5 INJECTION INTRAMUSCULAR; INTRAVENOUS EVERY 6 HOURS PRN
Status: DISCONTINUED | OUTPATIENT
Start: 2024-03-06 | End: 2024-03-07 | Stop reason: HOSPADM

## 2024-03-06 RX ORDER — CEFAZOLIN SODIUM 2 G/100ML
2 INJECTION, SOLUTION INTRAVENOUS ONCE
Status: DISCONTINUED | OUTPATIENT
Start: 2024-03-06 | End: 2024-03-06 | Stop reason: HOSPADM

## 2024-03-06 RX ORDER — ACETAMINOPHEN 325 MG/1
650 TABLET ORAL EVERY 4 HOURS PRN
Status: DISCONTINUED | OUTPATIENT
Start: 2024-03-06 | End: 2024-03-06 | Stop reason: HOSPADM

## 2024-03-06 RX ORDER — ASPIRIN 325 MG
325 TABLET, DELAYED RELEASE (ENTERIC COATED) ORAL 2 TIMES DAILY
Refills: 0 | COMMUNITY
Start: 2024-03-06

## 2024-03-06 RX ORDER — METOPROLOL SUCCINATE 25 MG/1
25 TABLET, EXTENDED RELEASE ORAL DAILY
Status: DISCONTINUED | OUTPATIENT
Start: 2024-03-06 | End: 2024-03-07 | Stop reason: HOSPADM

## 2024-03-06 RX ADMIN — LAMOTRIGINE 100 MG: 100 TABLET ORAL at 21:50

## 2024-03-06 RX ADMIN — KETOROLAC TROMETHAMINE 15 MG: 15 INJECTION, SOLUTION INTRAMUSCULAR; INTRAVENOUS at 16:48

## 2024-03-06 RX ADMIN — ESCITALOPRAM OXALATE 10 MG: 10 TABLET ORAL at 21:51

## 2024-03-06 RX ADMIN — METOPROLOL TARTRATE 2 MG: 5 INJECTION INTRAVENOUS at 07:58

## 2024-03-06 RX ADMIN — PROPOFOL 50 MCG/KG/MIN: 10 INJECTION, EMULSION INTRAVENOUS at 07:56

## 2024-03-06 RX ADMIN — OXYBUTYNIN CHLORIDE 5 MG: 5 TABLET ORAL at 21:51

## 2024-03-06 RX ADMIN — FENTANYL CITRATE 50 MCG: 50 INJECTION, SOLUTION INTRAMUSCULAR; INTRAVENOUS at 07:48

## 2024-03-06 RX ADMIN — ONDANSETRON 4 MG: 2 INJECTION INTRAMUSCULAR; INTRAVENOUS at 13:31

## 2024-03-06 RX ADMIN — DIPHENHYDRAMINE HYDROCHLORIDE 12.5 MG: 50 INJECTION, SOLUTION INTRAMUSCULAR; INTRAVENOUS at 08:26

## 2024-03-06 RX ADMIN — SODIUM CHLORIDE, POTASSIUM CHLORIDE, SODIUM LACTATE AND CALCIUM CHLORIDE 100 ML/HR: 600; 310; 30; 20 INJECTION, SOLUTION INTRAVENOUS at 12:40

## 2024-03-06 RX ADMIN — FENTANYL CITRATE 50 MCG: 50 INJECTION, SOLUTION INTRAMUSCULAR; INTRAVENOUS at 08:06

## 2024-03-06 RX ADMIN — BUPIVACAINE HYDROCHLORIDE IN DEXTROSE 13.5 MG: 7.5 INJECTION, SOLUTION SUBARACHNOID at 07:51

## 2024-03-06 RX ADMIN — Medication 2 L/MIN: at 14:18

## 2024-03-06 RX ADMIN — CEFAZOLIN SODIUM 2 G: 2 INJECTION, SOLUTION INTRAVENOUS at 16:48

## 2024-03-06 RX ADMIN — TRANEXAMIC ACID 1000 MG: 10 INJECTION, SOLUTION INTRAVENOUS at 08:05

## 2024-03-06 RX ADMIN — CEFAZOLIN 2 G: 1 INJECTION, POWDER, FOR SOLUTION INTRAMUSCULAR; INTRAVENOUS at 07:46

## 2024-03-06 RX ADMIN — Medication 2 L/MIN: at 10:33

## 2024-03-06 RX ADMIN — ZONISAMIDE 100 MG: 100 CAPSULE ORAL at 21:50

## 2024-03-06 RX ADMIN — MIDAZOLAM HYDROCHLORIDE 2 MG: 1 INJECTION, SOLUTION INTRAMUSCULAR; INTRAVENOUS at 07:48

## 2024-03-06 RX ADMIN — ONDANSETRON 4 MG: 2 INJECTION INTRAMUSCULAR; INTRAVENOUS at 08:31

## 2024-03-06 RX ADMIN — TRANEXAMIC ACID 1000 MG: 10 INJECTION, SOLUTION INTRAVENOUS at 09:45

## 2024-03-06 RX ADMIN — ACETAMINOPHEN 975 MG: 325 TABLET ORAL at 17:52

## 2024-03-06 RX ADMIN — KETOROLAC TROMETHAMINE 15 MG: 15 INJECTION, SOLUTION INTRAMUSCULAR; INTRAVENOUS at 23:01

## 2024-03-06 RX ADMIN — SODIUM CHLORIDE, POTASSIUM CHLORIDE, SODIUM LACTATE AND CALCIUM CHLORIDE: 600; 310; 30; 20 INJECTION, SOLUTION INTRAVENOUS at 08:24

## 2024-03-06 RX ADMIN — HYDROMORPHONE HYDROCHLORIDE 0.5 MG: 1 INJECTION, SOLUTION INTRAMUSCULAR; INTRAVENOUS; SUBCUTANEOUS at 13:31

## 2024-03-06 RX ADMIN — METOPROLOL TARTRATE 2 MG: 5 INJECTION INTRAVENOUS at 08:05

## 2024-03-06 RX ADMIN — DEXAMETHASONE SODIUM PHOSPHATE 4 MG: 4 INJECTION INTRA-ARTICULAR; INTRALESIONAL; INTRAMUSCULAR; INTRAVENOUS; SOFT TISSUE at 07:56

## 2024-03-06 RX ADMIN — SODIUM CHLORIDE, POTASSIUM CHLORIDE, SODIUM LACTATE AND CALCIUM CHLORIDE 100 ML/HR: 600; 310; 30; 20 INJECTION, SOLUTION INTRAVENOUS at 07:08

## 2024-03-06 RX ADMIN — AMLODIPINE BESYLATE 5 MG: 5 TABLET ORAL at 21:52

## 2024-03-06 SDOH — SOCIAL STABILITY: SOCIAL INSECURITY: ABUSE: ADULT

## 2024-03-06 SDOH — HEALTH STABILITY: MENTAL HEALTH: CURRENT SMOKER: 0

## 2024-03-06 SDOH — SOCIAL STABILITY: SOCIAL INSECURITY: HAVE YOU HAD THOUGHTS OF HARMING ANYONE ELSE?: NO

## 2024-03-06 SDOH — SOCIAL STABILITY: SOCIAL INSECURITY: DO YOU FEEL UNSAFE GOING BACK TO THE PLACE WHERE YOU ARE LIVING?: NO

## 2024-03-06 SDOH — SOCIAL STABILITY: SOCIAL INSECURITY: DO YOU FEEL ANYONE HAS EXPLOITED OR TAKEN ADVANTAGE OF YOU FINANCIALLY OR OF YOUR PERSONAL PROPERTY?: NO

## 2024-03-06 SDOH — SOCIAL STABILITY: SOCIAL INSECURITY: ARE YOU OR HAVE YOU BEEN THREATENED OR ABUSED PHYSICALLY, EMOTIONALLY, OR SEXUALLY BY ANYONE?: NO

## 2024-03-06 SDOH — SOCIAL STABILITY: SOCIAL INSECURITY: DOES ANYONE TRY TO KEEP YOU FROM HAVING/CONTACTING OTHER FRIENDS OR DOING THINGS OUTSIDE YOUR HOME?: NO

## 2024-03-06 SDOH — SOCIAL STABILITY: SOCIAL INSECURITY: ARE THERE ANY APPARENT SIGNS OF INJURIES/BEHAVIORS THAT COULD BE RELATED TO ABUSE/NEGLECT?: NO

## 2024-03-06 SDOH — SOCIAL STABILITY: SOCIAL INSECURITY: HAS ANYONE EVER THREATENED TO HURT YOUR FAMILY OR YOUR PETS?: NO

## 2024-03-06 SDOH — SOCIAL STABILITY: SOCIAL INSECURITY: WERE YOU ABLE TO COMPLETE ALL THE BEHAVIORAL HEALTH SCREENINGS?: YES

## 2024-03-06 ASSESSMENT — PAIN SCALES - GENERAL
PAINLEVEL_OUTOF10: 7
PAINLEVEL_OUTOF10: 0 - NO PAIN
PAINLEVEL_OUTOF10: 3
PAINLEVEL_OUTOF10: 0 - NO PAIN
PAINLEVEL_OUTOF10: 3

## 2024-03-06 ASSESSMENT — LIFESTYLE VARIABLES
AUDIT-C TOTAL SCORE: 0
HOW OFTEN DO YOU HAVE 6 OR MORE DRINKS ON ONE OCCASION: NEVER
AUDIT-C TOTAL SCORE: 0
HOW OFTEN DO YOU HAVE A DRINK CONTAINING ALCOHOL: NEVER
SKIP TO QUESTIONS 9-10: 1
HOW MANY STANDARD DRINKS CONTAINING ALCOHOL DO YOU HAVE ON A TYPICAL DAY: PATIENT DOES NOT DRINK

## 2024-03-06 ASSESSMENT — COGNITIVE AND FUNCTIONAL STATUS - GENERAL
TURNING FROM BACK TO SIDE WHILE IN FLAT BAD: A LITTLE
MOBILITY SCORE: 19
WALKING IN HOSPITAL ROOM: A LITTLE
HELP NEEDED FOR BATHING: A LITTLE
STANDING UP FROM CHAIR USING ARMS: A LITTLE
MOVING TO AND FROM BED TO CHAIR: A LITTLE
HELP NEEDED FOR BATHING: A LITTLE
MOVING FROM LYING ON BACK TO SITTING ON SIDE OF FLAT BED WITH BEDRAILS: A LITTLE
PATIENT BASELINE BEDBOUND: NO
WALKING IN HOSPITAL ROOM: A LITTLE
DRESSING REGULAR LOWER BODY CLOTHING: A LITTLE
TOILETING: A LITTLE
MOVING TO AND FROM BED TO CHAIR: A LITTLE
MOBILITY SCORE: 18
TURNING FROM BACK TO SIDE WHILE IN FLAT BAD: A LITTLE
DRESSING REGULAR LOWER BODY CLOTHING: A LITTLE
DRESSING REGULAR UPPER BODY CLOTHING: A LITTLE
DAILY ACTIVITIY SCORE: 20
TOILETING: A LITTLE
DAILY ACTIVITIY SCORE: 20
MOVING FROM LYING ON BACK TO SITTING ON SIDE OF FLAT BED WITH BEDRAILS: A LITTLE
DRESSING REGULAR UPPER BODY CLOTHING: A LITTLE
CLIMB 3 TO 5 STEPS WITH RAILING: A LITTLE
CLIMB 3 TO 5 STEPS WITH RAILING: A LITTLE

## 2024-03-06 ASSESSMENT — ACTIVITIES OF DAILY LIVING (ADL)
DRESSING YOURSELF: NEEDS ASSISTANCE
ASSISTIVE_DEVICE: WALKER
ADEQUATE_TO_COMPLETE_ADL: YES
FEEDING YOURSELF: INDEPENDENT
BATHING: NEEDS ASSISTANCE
HEARING - RIGHT EAR: HEARING AID
LACK_OF_TRANSPORTATION: NO
LACK_OF_TRANSPORTATION: NO
HEARING - LEFT EAR: HEARING AID
PATIENT'S MEMORY ADEQUATE TO SAFELY COMPLETE DAILY ACTIVITIES?: YES
GROOMING: INDEPENDENT
WALKS IN HOME: INDEPENDENT
JUDGMENT_ADEQUATE_SAFELY_COMPLETE_DAILY_ACTIVITIES: YES
TOILETING: NEEDS ASSISTANCE

## 2024-03-06 ASSESSMENT — PATIENT HEALTH QUESTIONNAIRE - PHQ9
SUM OF ALL RESPONSES TO PHQ9 QUESTIONS 1 & 2: 0
1. LITTLE INTEREST OR PLEASURE IN DOING THINGS: NOT AT ALL
2. FEELING DOWN, DEPRESSED OR HOPELESS: NOT AT ALL

## 2024-03-06 ASSESSMENT — PAIN - FUNCTIONAL ASSESSMENT
PAIN_FUNCTIONAL_ASSESSMENT: 0-10
PAIN_FUNCTIONAL_ASSESSMENT: 0-10

## 2024-03-06 ASSESSMENT — COLUMBIA-SUICIDE SEVERITY RATING SCALE - C-SSRS
6. HAVE YOU EVER DONE ANYTHING, STARTED TO DO ANYTHING, OR PREPARED TO DO ANYTHING TO END YOUR LIFE?: NO
2. HAVE YOU ACTUALLY HAD ANY THOUGHTS OF KILLING YOURSELF?: NO
1. IN THE PAST MONTH, HAVE YOU WISHED YOU WERE DEAD OR WISHED YOU COULD GO TO SLEEP AND NOT WAKE UP?: NO

## 2024-03-06 NOTE — CONSULTS
Inpatient consult to Medicine  Consult performed by: Mara Sun APRN-CNP  Consult ordered by: Feliciano Vargas PA-C  Reason for consult: Medical Management          Reason For Consult  Medical Management    History Of Present Illness  Flower Lucas is a 83 y.o. female with a medical history of HTN, HLD, and OA who presented to [facility] for an elective left total knee arthroplasty. States she has been having knee pain for many years that is progressively worsening. Surgery course uneventful to this point.  EBL 20ml. Consulted for medical management. Seen and examined in her room this afternoon. Up in a chair with family at bedside. Currently is reporting minimal pain. She denies chest pain, breathing difficulties, abdominal pain, N/V/D/C, fever, or chills.       Past Medical History  Hypertension  Hyperlipidemia  Pre-Diabetes  Hypothyroidism  Paroxysmal A.Fib (remote)  Seizure Disorder  Anxiety  Insomnia  PTSD  Osteoarthritis  Colon Polyps    Surgical History  Appendectomy  Left Hip Arthroplasty with Revision  Left Mastectomy with Reconstruction  Left Humerus Fracture Repair  Right Total Knee Arthroplasty  Colonoscopy  Cataract Excision     Social History  She reports that she quit smoking about 57 years ago. Her smoking use included cigarettes. She has never used smokeless tobacco. She reports that she does not currently use alcohol. She reports that she does not use drugs.    Family History  Family History   Problem Relation Name Age of Onset    Other (Malignant Neoplasm) Mother      Lung cancer Mother      Other (Laryngeal Cancer) Father      Other (Malignant Neoplasm) Father          Allergies  Patient has no known allergies.    Review of Systems  Constitutional: Denies fever, chills, fatigue, weight loss/gain  HEENT: Denies ear ache, sore throat, nasal drainage  Eyes: Denies blurred; + double vision  Respiratory: Denies cough, shortness or breath, wheezing  Cardiovascular: Denies chest pain, palpitations, shortness  of breath with exertion, edema  GI: Denies abdominal pain, nausea, vomiting, diarrhea, constipation, bloody stools  : Denies urinary burning, urgency, frequency, hematuria  Musculoskeletal: See HPI  Endocrine: Denies cold/heat intolerance, excessive thirst, excessive hunger  Neuro: Denies dizziness, lightheadedness, seizures, headaches  Psychiatric: Denies anxiety, depression, self-harm  Skin: Denies wounds, rashes, lesions  Hematologic: Denies easy bruising, easy bleeding, clotting disorder      Physical Exam  Constitutional: A&O x 3; NAD; calm and cooperative  Eyes: EOM's intact  HEENT: Normocephalic, Atraumatic. Oral mucosa moist.   Neck: Supple. No JVD, lymphadenopathy.   Lungs: CTAB with fair air movement. Respirations even and unlabored on room air.   Heart: RRR; + mumur  Abdomen: Soft, non-tender, non-distended, +BS  MS/Extremities: KAISER with LLE pain; left knee incision No edema. Peripheral pulses intact bilaterally.   Neuro: A&O x3; no focal deficits; gross motor and sensation intact.   Skin: Warm and dry. No rashes or lesions  Psych: Normal affect.       Last Recorded Vitals  /62   Pulse 83   Temp 37.2 °C (99 °F)   Resp 18   Wt 71 kg (156 lb 8.4 oz)   SpO2 95%     Relevant Results  Scheduled medications  acetaminophen, 975 mg, oral, q6h AMITA  amLODIPine, 5 mg, oral, Daily  [START ON 3/7/2024] aspirin, 325 mg, oral, BID  ceFAZolin, 2 g, intravenous, q8h  denosumab, 60 mg, subcutaneous, Once  docusate sodium, 100 mg, oral, BID  escitalopram, 10 mg, oral, Daily  hydroCHLOROthiazide, 12.5 mg, oral, Daily  ketorolac, 15 mg, intravenous, q6h  lamoTRIgine, 100 mg, oral, BID  [START ON 3/7/2024] levothyroxine, 112 mcg, oral, Daily  metoprolol succinate XL, 25 mg, oral, Daily  oxybutynin, 5 mg, oral, Daily  zonisamide, 100 mg, oral, BID      Continuous medications  lactated Ringer's, 100 mL/hr, Last Rate: Stopped (03/06/24 1018)  lactated Ringer's, 100 mL/hr  oxygen, 2 L/min      PRN medications  PRN  medications: carisoprodol, diphenhydrAMINE, gabapentin, morphine, naloxone, ondansetron ODT **OR** ondansetron, oxyCODONE, oxyCODONE, traMADol      Assessment/Plan   83 y.o. female with a medical history of HTN, HLD, and OA who presented to [facility] for an elective left total knee arthroplasty. Consulted for medical management.     CV: HTN, PAF  -Resumed home regimen: Amlodipine, Hydrochlorothiazide, Metoprolol  -Reports that PAF was many years ago and has not recurred    Left Knee Osteoarthritis  -S/P left TKA by Dr. Ramirez today  -Incisional care, antibiotics, activity restrictions per orthopedics surgery.  -PT/OT to follow. WBAT.   -Medicate for pain  -Maintain bowel regimen  -Advised IS use, mobilization.   -Monitor H&H for ABLA. EBL 20ml.   -DVT prophylaxis per surgery: ASA 325mg BID  -CBC in AM.     Seizure Disorder  -Resume Zonisamide, Lamotrigine    Anxiety/PTSD  -Escitalopram    Hypothyroidism  -On Levothyroxine    Disposition  -Plan of care discussed with medicine.   -Discharge per orthopedics. Will follow.   -> 60 minutes spent in coordination of care, including physical examination, review of chart, and discussion with pertinent staff.      Thank you for the consult. Please call/message via secure chat with medical questions.      NIRAV Antoine-CNP

## 2024-03-06 NOTE — DISCHARGE SUMMARY
Discharge Diagnosis  Status post total knee replacement, left    Issues Requiring Follow-Up  PO L TKA    Test Results Pending At Discharge  Pending Labs       Order Current Status    VERIFY ABO/Rh Group Test Collected (03/06/24 4960)            Hospital Course   The patient is a pleasant 83 year old female who underwent an elective left total knee arthroplasty using ROBB performed by Dr. Ramirez at [facility] on 3/6/2024.  Patient had a routine uncomplicated preoperative, intraoperative and immediate postoperative surgical course.  As planned postoperatively the patient was admitted to the regular nursing floor at [facility].  While in the regular nursing floor patient received consultations from both internal medicine as well as physical therapy.  Patient received preoperative and postoperative intravenous antibiotics.  Pain control is with a combination of both oral and IV narcotic and nonnarcotic medications.  DVT prophylaxis was with sequentials early ambulation and aspirin therapy.  Anticoagulation medications will be continued for minimum of 30 days postsurgery.  Patient was discharged home with home physical therapy and home health care outpatient follow-up is being arranged for 2 weeks in the outpatient clinic postdischarge.    Pertinent Physical Exam At Time of Discharge  Physical Exam  Cardiovascular:      Rate and Rhythm: Normal rate.   Pulmonary:      Effort: Pulmonary effort is normal.   Abdominal:      Palpations: Abdomen is soft.   Musculoskeletal:         General: Swelling and tenderness present.      Left lower leg: Edema present.      Comments: PO bandages appreciated on operative side.   Neurological:      Mental Status: She is alert.         Home Medications     Medication List      ASK your doctor about these medications     acetaminophen 650 mg ER tablet; Commonly known as: Tylenol 8 HOUR   amLODIPine 5 mg tablet; Commonly known as: Norvasc; Take 1 tablet (5 mg)   by mouth  once daily.   biotin 5 mg capsule   calcium carbonate-vitamin D3 600 mg-20 mcg (800 unit) tablet   chlorhexidine 0.12 % solution; Commonly known as: Peridex; Use 15 mL in   the mouth or throat 2 times a day for 1 day. Use 15ml once the night   before surgery and 15ml once the morning of surgery; Ask about: Should I   take this medication?   cholecalciferol 50,000 unit capsule; Commonly known as: Vitamin D-3   cyanocobalamin 1,000 mcg tablet; Commonly known as: Vitamin B-12   docusate sodium 100 mg capsule; Commonly known as: Colace; TAKE 1   CAPSULE BY MOUTH TWO TIMES A DAY AS NEEDED   escitalopram 10 mg tablet; Commonly known as: Lexapro; Take 1 tablet (10   mg) by mouth once daily.   hydroCHLOROthiazide 12.5 mg tablet; Commonly known as: Microzide; Take 1   tablet (12.5 mg) by mouth once daily.   lamoTRIgine 100 mg tablet; Commonly known as: LaMICtal; Take 1 tablet   (100 mg) by mouth 2 times a day.   levothyroxine 112 mcg tablet; Commonly known as: Synthroid, Levoxyl;   Take 1 tablet (112 mcg) by mouth once daily.   metoprolol succinate XL 25 mg 24 hr tablet; Commonly known as:   Toprol-XL; Take 1 tablet by mouth once daily   oxybutynin 5 mg tablet; Commonly known as: Ditropan; Take 1 tablet (5   mg) by mouth once daily.   Prolia 60 mg/mL syringe; Generic drug: denosumab; Inject 1 mL (60 mg)   under the skin 1 time for 1 dose.; Ask about: Which instructions should I   use?   triamcinolone 0.1 % ointment; Commonly known as: Kenalog   Vitamin C 500 mg tablet; Generic drug: ascorbic acid   zonisamide 100 mg capsule; Commonly known as: Zonegran       Outpatient Follow-Up  Future Appointments   Date Time Provider Department Center   8/27/2024 10:00 AM INF 01 CONCORD [id number] [facility]   10/8/2024 10:00 AM Kathy Wade MD [id number] [facility]   2/21/2025 10:00 AM INF 02 SINGH [id number] [facility]   FU at [facility] in 2 weeks, 3/21/2024    Feliciano Vargas PA-C

## 2024-03-06 NOTE — DISCHARGE INSTRUCTIONS
[facility]                          Augustine Ramirez,   Phone: [phone]    POSTOPERATIVE INSTRUCTIONS: TOTAL HIP & TOTAL KNEE ARTHROPLASTY    General/highlights: Flex/tighten the muscles in the buttocks, thighs and calves often when in chair or bed.  Utilize the incentive spirometer often especially the next 3 days.  Walk at least 10 steps every hour that you are awake.  Take stairs 1 at a time, good leg leads up, bed leg legs down, use the handrails.  You may be weightbearing as tolerated, in general the walker is used for 2 weeks.    PAIN, SWELLING & BRUISING  Some pain, stiffness and swelling is normal for up to 1 year after surgery.  Pain will start to let up over time depending on your activity level.  It is preferable to rest for 24 hours following your surgery  Pain is often delayed for 24-48 hours after surgery.  Pain may be dull/achy, throbbing, or even sharp/nerve sensations  It is normal for swelling and bruising to worsen before it gets better.  These symptoms usually peak 1 week after surgery  Swelling and bruising may appear throughout the leg, all the way down to your toes  Wear your compression stockings every day during the day for 14 days and remove them at night.  This will help control swelling    WOUND CARE INSTRUCTIONS  Your surgical bandage will be removed 2 weeks after surgery at your post-operative visit.  If your bandage becomes compromised, begins to come off before then, or soaks through with drainage call the office immediately.  You may have sutures under the skin that dissolve on their own over time.    As the sutures absorb occasionally a small suture abscess can develop, this is not uncommon for up to 6 weeks, if this occurs please notify your surgeon immediately.    HYGIENE  You may shower 24 hours after your surgery, provided the Mepilex silver dressing is in place.  No tub bathing or submerging underwater.  Do not scrub directly over the  surgical bandage  Do not use any creams, lotions or ointments on the surgical leg for 4 weeks after surgery, or until you have been cleared to do so by your surgeon    GENERAL INSTRUCTIONS  Do not drink alcoholic beverages (beer and wine included) for 24 hours following your surgery, or while you are taking narcotic pain medications  Delay making important decisions until you are fully recovered  You cannot swim or submerge in water for at least 6 weeks after surgery, or until you are cleared by your surgeon.  You may start kneeling 3 months after knee replacement surgery, once you have been cleared by your surgeon.  This may not ever feel “normal” or comfortable    HOME DIET  Resume your normal diet after surgery. If you are on a specific type of diet for your condition, resume that instead.    Choose foods that help promote good bowel habits and prevent constipation, such as foods high in fiber.          POSTOPERATIVE MEDICATIONS    Pain medications have been ordered to help manage pain throughout recovery.  While you are using narcotic pain medication, you should be using a stool softener or laxative to prevent constipation.  My preference is parallax powder once or twice a day when taking the narcotic pain medicine  It is important to eat a small meal or snack before taking pain medications to avoid nausea or stomach upset.    MEDICATION REFILLS - [phone]    If you need to request a medication refill, please call the office between 8:30am-4:30pm, Monday through Friday.    Any calls received outside of this timeframe will be handled on the next business day.    Medication requests received on Saturday or Sunday will be handled on Monday.    Please allow 3-5 business days for all medication requests to be processed.    RESTARTING HOME MEDICATIONS  You may restart your home medications the following day after your surgery UNLESS you have been given alternate instructions.    Follow the instructions given to  "you on your hospital discharge instructions for more information regarding your home medications.    ASSISTIVE DEVICE & MOVEMENT  Initially, you will use a walker or crutches to walk for the first 2 weeks.  Once your therapist feels you are ready, you will wean to one crutch or cane followed by no assistive device.  It is important to keep moving throughout recovery.  Walk at least 10 steps every hour that you are awake.  Stairs are part of your recovery, the \"good \"leg leads on the way up, the \"bad \"leg leads on the way down to, use the handrails and not the walker or crutches.  You should be up and walking around several times per day as well as bending your knee and making sure your knee is going completely straight (for knee replacements).  For anterior hip replacements avoid straight leg raise.    NUMBNESS/CLICKING    Decreased sensation or numbness is common on or around the area of the incision due to sensory nerves that are affected at the time of surgery.  The area of numbness will be lateral to the incision  You might always have numbness but the size of the area of numbness should decrease with time.  It is common for patients to have a “click” in the knee with movement.  This is usually nothing to be concerned about.  There are several reasons why your knee can be making these noises, including the implant components rubbing against each other, or a tendons going over a bony prominence.  Grinding can also occur and is not out of the ordinary.  Grinding can be caused by scar tissue formation  Noises will often settle over time once muscle strength improves.    DIFFICULTY SLEEPING    It is very common for patients to have difficulty sleeping at night which can be caused pain, medication, or feeling of anxiety.  Sleep disturbance typically worsens 4-6 weeks after surgery.  You are permitted to sleep on your back or side with a  pillow between your legs for comfort            DRIVING & TRAVEL  Your surgeon " will address this at your post-op appointment.  You must not be taking narcotic pain medication to be cleared to drive.  LEFT LEG JOINT REPLACMENT:  You may drive once you have regained full control of your leg, typically around 2 week after surgery  RIGHT LEG JOINT REPLACEMENT:  You must speak with your surgeon before you resume driving.  Driving can typically be resumed by 4-6 weeks after surgery.  During long distance travel, you should attempt to change position or stand every hour.  You should complete ankle pumps throughout your travel if you are sitting for long periods of time.  If traveling within the first 2 weeks after surgery, you should wear your compression stockings.    DENTAL & OTHER PROCEDURES    All patients must wait a minimum of 3 months for elective procedures, including routine dental cleanings.  For any dental appointment - cleaning or dental procedures - patients must take a prophylactic antibiotic 1 hour before the appointment.  You will also need to call for an antibiotic prior to any other invasive test, procedure, or surgery.  These prophylactic antibiotics will be needed for the rest of your life, in order to prevent infections.  Please call your surgeons office at [phone] to request the antibiotic.      PHYSICAL THERAPY    Following surgery it is important to progress through recovery with in-home or outpatient physical therapy.  You should continue to complete home exercises provided from the hospital on days that you are not working with a physical therapist.    It is common to have a temporary increase in pain and swelling upon starting outpatient physical therapy and/or changing your exercise routine.  Continue to use ice to help with symptoms.     FOLLOW-UP APPOINTMENT - [phone]    Your post-surgical appointments will take place approximately 2 weeks, 6 weeks, 3 months and 1 year following surgery.  Joint replacements are monitored thereafter every 2-5 years for life.  If  you have any questions or need to make changes to this appointment, please contact the office:    EMERGENCIES & WHEN TO CALL YOUR SURGEON  When to contact our office immediately:  Any falls or injury to the joint replacement  Fever >101.5 for at least 48 hours after surgery or chills.  Excessive bleeding from incision(s). A small amount of drainage is normal and expected.  Signs of infection of incision(s)-excessive drainage that is soaking through your dressing (especially if it is pus-like), redness that is spreading out from the edges of your incision, or increased warmth around the area.  Excruciating pain for which the pain medication, taken as instructed, is not helping.  Severe calf pain.  Go directly to the emergency room or call 911, if you are experiencing chest pain or difficulty breathing.              ICE/COLD THERAPY  Ice is most important during the first 2 weeks after surgery, but should be used for several weeks as needed.  Never place ice, or cold therapy devices directly on the skin.  You should always have a protective layer between your skin and the cold.  You have been prescribed to ice your total joint at a minimum of twice per hour for 20 minutes while awake during the first 6 weeks after surgery if you are using ice packs. This will help with pain control.  If you are using an ice machine, please follow ice machine instructions.  After knee replacement is extremely important to elevate the leg straight on an incline, not flat.    COLD THERAPY MACHINE RECOMMENDATIONS      Cold therapy devices can be used before and after surgery to assist in comfort and help to reduce pain and swelling.  These devices differ from ice or ice packs whereas the mechanism circulates water through tubing and a pad to provide longer periods of cold therapy to the desired site.  While in the hospital, you can use your cold devices around the clock for optimal comfort.  We recommend using cold therapy after working  with therapy or completing exercises on your own.  Once you are discharged home, there is no set schedule in which you must follow while using cold therapy.  Below are a few points to remember when using a cold therapy device:    Read the 's instructions prior to first the use.  Follow instructions for filling the cooler (water first, then ice).  Always make sure there is a layer of protection between the cold pad and your skin (Clothing, Towel, Ace Bandage, etc.)  Allow the device to circulate cold water throughout the pad prior wrapping the pad around your leg (approximately 10 minutes).  Place the pad on your leg in the desired position to meet your pain management needs and use the wraps provided to secure the pad to your body.  The purpose of this device is to use consistently throughout the day.  You do not need to need to use the 20 on, 20 off method when using an ice machine.  During waking hours, remove the cold pad every 1-2 hours to perform a skin check  Detach the pad from the cooler and ambulate at least once every hour  After removing the pad, allow at least 30 minutes before resuming cold therapy  You may wear the cold therapy device during periods of sleep including overnight    If you wake up during the night, you can check the skin at this time.  You do not need to wake up specifically to perform skin checks.  Empty the cooler and pad when device is not in use.  Follow 's instructions for cleaning your cold therapy device.    [facility] can assist with problems related to products purchased through the hospital  [phone] - [place]   or   [phone] - Tamy    Sample Medication Schedule  [facility]    Medication Refills - [phone] - Monday through Friday 8:30am-4:30pm  Please allow 3-5 days for medication refill requests to be processed.

## 2024-03-06 NOTE — ANESTHESIA PROCEDURE NOTES
Peripheral Block    Patient location during procedure: pre-op  Reason for block: at surgeon's request and post-op pain management  Staffing  Performed: attending   Authorized by: Aelx Leon MD    Performed by: Alex Leon MD  Preanesthetic Checklist  Completed: patient identified, IV checked, site marked, risks and benefits discussed, surgical consent, monitors and equipment checked, pre-op evaluation and timeout performed   Timeout performed at:   Peripheral Block  Patient position: laying flat  Prep: ChloraPrep and site prepped and draped  Patient monitoring: heart rate and continuous pulse ox  Block type: adductor canal  Laterality: left  Injection technique: single-shot  Guidance: ultrasound guided  Local infiltration: lidocaine  Needle  Needle type: short-bevel   Needle gauge: 22 G  Needle length: 8 cm  Needle localization: anatomical landmarks, ultrasound guidance and nerve stimulator  Assessment  Injection assessment: negative aspiration for heme, no paresthesia on injection, incremental injection and local visualized surrounding nerve on ultrasound  Paresthesia pain: none  Heart rate change: no  Slow fractionated injection: yes  Additional Notes  30mL 0.5% marcaine 1:400k epi 4mg decadron

## 2024-03-06 NOTE — ANESTHESIA PROCEDURE NOTES
Spinal Block    Patient location during procedure: OR  Start time: 3/6/2024 7:48 AM  End time: 3/6/2024 7:51 AM  Reason for block: primary anesthetic and at surgeon's request  Staffing  Performed: CRNA   Authorized by: MAYURI Ewing    Performed by: MAYURI Ewing    Preanesthetic Checklist  Completed: patient identified, IV checked, site marked, risks and benefits discussed, surgical consent, monitors and equipment checked, pre-op evaluation, timeout performed and sterile techniques followed  Block Timeout  RN/Licensed healthcare professional reads aloud to the Anesthesia provider and entire team: Patient identity, procedure with side and site, patient position, and as applicable the availability of implants/special equipment/special requirements.  Patient on coagulant treatment: no  Timeout performed at: 3/6/2024 7:48 AM  Spinal Block  Patient position: sitting  Prep: Betadine  Sterility prep: cap, drape, gloves, gown, hand hygiene and mask  Sedation level: moderate sedation  Patient monitoring: continuous pulse oximetry  Approach: midline  Vertebral space: L2-3  Injection technique: single-shot  Needle  Needle type: pencil-point   Needle gauge: 24 G  Needle length: 4 in  Free flowing CSF: yes    Assessment  Sensory level: T10  Block outcome: patient comfortable  Procedure assessment: patient tolerated procedure well with no immediate complications

## 2024-03-06 NOTE — CONSULTS
Service:   Service: Medicine     Consult:  Consult requested by (Attending Name): BANDAR LUGO   Reason: Post op L DAA ISMA     History of Present Illness:   HPI:    CARYN WEIR is a 82 year old Female with PMH of epilepsy, hypothyroidism, depression with anxiety, HTN, HLD, glaucoma, and OA that presented for left hip surgery.  Patient had uncomplicated procedure however upon starting to work with physical therapy had episode of unresponsiveness. Patient was reportedly walking to bathroom and then suddenly stopped responding to commands and had blank stare with lip smacking  so rapid response was called. Patient currently has no complaints and reports she took all her medications this morning prior to surgery. She denies any recent fever/chills, chest pain, dyspnea, N/V, abdominal pain, and changes in urinary or bowel habits.     PMH: epilepsy, hypothyroidism, depression with anxiety, HTN, HLD, glaucoma, and OA  PSH: DORIS, appendectomy, left mastectomy and reconstruction, right TKA  SH: former tobacco abuse; denies ETOH and illicit drug use  FH: CAD, denies CVA and DM    Review Family/Social History and ROS:   Social History:    Smoking Status: never smoker  (1)   Alcohol Use: occasionally (1)     Constitutional: NEGATIVE: Fever, Chills     Eyes: NEGATIVE: Vision Loss/ Change     ENMT: NEGATIVE: Throat Pain     Respiratory: NEGATIVE: Dry Cough, Productive Cough,  Shortness of Breath     Cardiac: NEGATIVE: Dyspnea on Exertion     Gastrointestinal: NEGATIVE: Nausea, Vomiting, Diarrhea,  Constipation, Abdominal Pain     Genitourinary: NEGATIVE: Dysuria     Musculoskeletal: NEGATIVE: Pain     Neurological: NEGATIVE: Dizziness     Skin: NEGATIVE: Rash              Allergies:  ·  No Known Allergies :     Objective:     Objective Information:        T   P  R  BP   MAP  SpO2   Value  36.4  77  18  120/56   86  95%  Date/Time 7/19 19:45 7/19 19:45 7/19 19:45 7/19 19:45  7/19 15:45 7/19 19:45  Range  (36.1C - 36.4C )  (71  - 77 )  (18 - 18 )  (120 - 135 )/ (56 - 75 )  (86 - 86 )  (91% - 96% )   As of 19-Jul-2023 15:45:00, patient is on 2 L/min of oxygen via room air.    Physical Exam by System:    Constitutional: Well developed, no apparent distress,  alert and oriented x3   Head/Neck: Normocephalic, atraumatic   Respiratory/Thorax: CTAB with good inspiratory effort,  no wheezing/crackles/rales   Cardiovascular: RRR with normal S1 and S2, no murmurs   Gastrointestinal: Soft, nontender, nondistended,  BSx4   Musculoskeletal: ROM intact, normal strength except  LLE in daphnie-operative setting   Extremities: No pretibial edema, good and equal pedal  pulses   Neurological: Alert and oriented x3   Psychological: Appropriate mood and behavior   Skin: Warm and dry, left hip post-op dressing in  place     Medications:    Medications:          Continuous Medications       --------------------------------    1. Lactated Ringers Infusion:  1000  mL  IntraVenous  <Continuous>         Scheduled Medications       --------------------------------    1. Acetaminophen:  975  mg  Oral  Every 6 Hours    2. amLODIPine (NORVASC):  5  mg  Oral  Daily    3. Aspirin Enteric Coated:  325  mg  Oral  2 Times a Day    4. ceFAZolin 2 gram/ D5W 100 mL Premix IVPB:  100  mL  IntraVenous Piggyback  Every 6 Hours    5. Docusate:  100  mg  Oral  2 Times a Day    6. hydroCHLOROthiazide:  12.5  mg  Oral  Daily    7. lamoTRIgine (LAMICTAL):  100  mg  Oral  2 Times a Day    8. Levothyroxine:  112  microgram(s)  Oral  Daily    9. Metoprolol Succinate Extended Release:  25  mg  Oral  Daily    10. Oxybutynin  - PEDS:  5  mg  Oral  Every Night    11. Polyethylene Glycol:  17  gram(s)  Oral  2 Times a Day    12. Zonisamide:  100  mg  Oral  2 Times a Day         PRN Medications       --------------------------------    1. diphenhydrAMINE:  25  mg  Oral  Every 6 Hours    2. Ketorolac Injectable:  15  mg  IntraVenous Push  Every 6 Hours    3. LORazepam Injectable:  2  mg   IntraVenous Push  Once    4. Magnesium Hydroxide -Al Hydrox -Simethicone Oral Liquid:  30  mL  Oral  Every 6 Hours    5. Metoclopramide Injectable:  5  mg  IntraVenous Push  Every 6 Hours    6. Morphine Injectable:  2  mg  IntraVenous Push  Every 4 Hours    7. Ondansetron Injectable:  4  mg  IntraVenous Push  Every 6 Hours    8. oxyCODONE Immediate Release:  5  mg  Oral  Every 6 Hours    9. oxyCODONE Immediate Release:  10  mg  Oral  Every 6 Hours    10. Sore Throat Lozenge:  1  lozenge(s)  Oral  Every 4 Hours    11. traMADol:  50  mg  Oral  Every 6 Hours    12. traZODone:  50  mg  Oral  At Bedtime        Recent Lab Results:    Results:        I have reviewed these laboratory results:    Complete Blood Count  19-Jul-2023 17:04:00      Result Value    White Blood Cell Count  15.1   H   Red Blood Cell Count  3.69   L   HGB  11.9   L   HCT  37.8    MCV  102   H   MCHC  31.5   L   PLT  367    RDW-CV  14.8   H     Comprehensive Metabolic Panel  19-Jul-2023 17:04:00      Result Value    Glucose, Serum  141   H   NA  139    K  3.9    CL  105    Bicarbonate, Serum  26    Anion Gap, Serum  12    BUN  22    CREAT  0.78    GFR Female  75    Calcium, Serum  8.4   L   ALB  3.9    ALKP  63    T Pro  5.8   L   T Bili  0.4    Alanine Aminotransferase, Serum  23    Aspartate Transaminase, Serum  28      Phosphorus, Serum  19-Jul-2023 17:04:00      Result Value    Phosphorus, Serum  3.3      Magnesium, Serum  19-Jul-2023 17:04:00      Result Value    Magnesium, Serum  1.85      Lactate, Level  19-Jul-2023 17:04:00      Result Value    Lactate, Level  1.7      Glucose_POCT  19-Jul-2023 16:14:00      Result Value    Glucose-POCT  148   H         Assessment:    81yo CF with PMH of epilepsy, hypothyroidism, depression with anxiety, HTN, HLD, glaucoma, and OA that presented for left hip surgery.    Left hip osteoarthritis  - POD#0 left direct approach total hip arthroplasty  - continue multimodal pain regimen  - PT/OT consulted  - mgmt  "per primary ortho team    Possible breakthrough seizure  - pt reports taking her medications prior to surgery but unable to confirm with family at bedside  - check lamotrigine and zonisamide levels  - restart home meds    Hypothyroidism  - continue home levothyroxine    Hypertension  - continue home amlodipine    DVT Proph: SCDs until POD#1    Dispo: Patient require observation in setting of possible breakthrough seizure, discharge per primary team.     Consult Status:  Consult Order ID: [id number]       Electronic Signatures:  Lizzie Ohara)  (Signed 19-Jul-2023 20:11)   Authored: Service, History of Present Illness, Review  Family/Social History and ROS, Allergies, Objective, Assessment/Recommendations, Note Completion      Last Updated: 19-Jul-2023 20:11 by Lizzie Ohara ()    References:  1.  Data Referenced From \"Patient Profile - Adult v2\" 19-Jul-2023 15:48   "

## 2024-03-06 NOTE — ANESTHESIA PREPROCEDURE EVALUATION
Patient: Flower Lucas    Procedure Information       Date/Time: 03/06/24 0730    Procedure: ROBB ARTHROPLASTY TOTAL KNEE (Left: Knee)    Location: [facility] / [facility]    Surgeons: Augustine Ramirez, DO            Relevant Problems   Cardiovascular   (+) Hyperlipidemia   (+) Hypertension   (+) Paroxysmal atrial flutter (CMS/HCC)      Endocrine   (+) Hypothyroidism      /Renal   (+) Acute lower urinary tract infection      Neuro/Psych   (+) Chronic anxiety   (+) Complex partial seizures with consciousness impaired (CMS/HCC)   (+) Depression, major, single episode, mild (CMS/HCC)   (+) Epilepsy with generalized tonic clonic seizures on awakening (CMS/HCC)   (+) Epilepsy, unspecified, not intractable, without status epilepticus (CMS/HCC)      Pulmonary   (+) Obstructive sleep apnea of adult      Musculoskeletal   (+) Osteoarthritis   (+) Osteoarthritis of left foot      Eyes, Ears, Nose, and Throat   (+) POAG (primary open-angle glaucoma)      Infectious Disease   (+) Acute lower urinary tract infection      Other   (+) Arthritis of hand       Clinical information reviewed:   Tobacco  Allergies  Meds   Med Hx  Surg Hx  OB Status  Fam Hx  Soc   Hx        NPO Detail:  NPO/Void Status  Carbohydrate Drink Given Prior to Surgery? : N  Date of Last Liquid: 03/05/24  Time of Last Liquid: 2300  Date of Last Solid: 03/05/24  Time of Last Solid: 2300  Last Intake Type: Clear fluids  Time of Last Void: 0600         Physical Exam    Airway  Mallampati: II     Cardiovascular   Rhythm: regular  Rate: normal     Dental    Pulmonary   Breath sounds clear to auscultation     Abdominal            Anesthesia Plan    History of general anesthesia?: yes  History of complications of general anesthesia?: no    ASA 3     spinal     The patient is not a current smoker.  Patient was not previously instructed to abstain from smoking on day of procedure.  Patient did not smoke on day of procedure.  Education provided regarding risk  of obstructive sleep apnea.  Anesthetic plan and risks discussed with patient.    Plan discussed with CRNA.

## 2024-03-06 NOTE — ANESTHESIA POSTPROCEDURE EVALUATION
Patient: Flower Lucas    Procedure Summary       Date: 03/06/24 Room / Location: [facility] / [facility]    Anesthesia Start: 0740 Anesthesia Stop: 1030    Procedure: ROBB ARTHROPLASTY TOTAL KNEE (Left: Knee) Diagnosis:       Primary osteoarthritis of left knee      (Primary osteoarthritis of left knee [M17.12])    Surgeons: Augustine Ramirez DO Responsible Provider: MAYURI Ewing    Anesthesia Type: spinal ASA Status: 3            Anesthesia Type: spinal    Vitals Value Taken Time   /63 03/06/24 1418   Temp 36.9 °C (98.4 °F) 03/06/24 1033   Pulse 85 03/06/24 1418   Resp 19 03/06/24 1418   SpO2 96 % 03/06/24 1418       Anesthesia Post Evaluation    Patient location during evaluation: PACU  Patient participation: complete - patient participated  Level of consciousness: awake  Pain management: adequate  Multimodal analgesia pain management approach  Airway patency: patent  Two or more strategies used to mitigate risk of obstructive sleep apnea  Cardiovascular status: acceptable  Respiratory status: acceptable  Hydration status: acceptable  Postoperative Nausea and Vomiting: none        No notable events documented.

## 2024-03-06 NOTE — OP NOTE
ROBB ARTHROPLASTY TOTAL KNEE (L) Operative Note     Date: 3/6/2024  OR Location: [facility]    Name: Flower Lucas, : 1940, Age: 83 y.o., MRN: [id number], Sex: female    Diagnosis  Pre-op Diagnosis     * Primary osteoarthritis of left knee [M17.12] Post-op Diagnosis     * Primary osteoarthritis of left knee [M17.12]     Procedures  ROBB ARTHROPLASTY TOTAL KNEE  82518 - NM ARTHRP KNE CONDYLE&PLATU MEDIAL&LAT COMPARTMENTS      Surgeons      * Augustine Ramirez - Primary    Resident/Fellow/Other Assistant:  Surgeon(s) and Role: Feliciano Vargas PA-C    Procedure Summary  Anesthesia: Regional nerve blocks, spinal anesthetic with MAC sedation ASA: ASA status not filed in the log.  Anesthesia Staff: CRNA: NIRAV Ewing-CRNA  Estimated Blood Loss: 20 mL  Intra-op Medications:   Administrations occurring from 0730 to 1030 on 24:   Medication Name Total Dose   sodium chloride 0.9 % irrigation solution 3,000 mL   EPINEPHrine (Adrenalin) 0.2 mL, ketorolac (Toradol) 30 mg, morphine PF (Duramorph) 5 mg, ropivacaine (Naropin) 30 mL in sodium chloride 0.9% 20 mL syringe 56.2 mL   lactated Ringer's infusion 190 mL              Anesthesia Record               Intraprocedure I/O Totals          Intake    Tranexamic Acid 0.00 mL    The total shown is the total volume documented since Anesthesia Start was filed.    Propofol Drip 0.00 mL    The total shown is the total volume documented since Anesthesia Start was filed.    lactated Ringer's infusion 1500.00 mL    Total Intake 1500 mL       Output    Est. Blood Loss 20 mL    Total Output 20 mL       Net    Net Volume 1480 mL          Specimen: No specimens collected     Staff:   Circulator: Denny Lundberg RN; Malika Gambino RN  Relief Circulator: Kamilah Smalls RN  Scrub Person: Paty Oviedo RN  RNFA: Ihsan Evans RN         Drains and/or Catheters: * None in log *    Tourniquet Times:     Total Tourniquet Time Documented:  area (laterality) - 73 minutes  Total:  area (laterality) - 73 minutes      Implants:  Implants       Type Name Action Serial No.      Joint PLATE, TIBIAL TRIATH BREONNA ADIA FXD BPLT P5 - [id number] Implanted      Joint FEM COMP, TRIATH CR SZ5 LEFT - [id number] Implanted      Joint PATELLA, TRIATHLON, ASYMMETRIC X3, SZ-A32 10MM - [id number] Implanted      Joint INSERT, TIBIAL, X3 TRIATHLON CS, SZ-5 13MM - [id number] Implanted      Joint CEMENT, BONE, SIMPLEX HV FULL DOSE W/GENTAMYCIN 40 GM - [id number] Implanted [id number]              Findings: See dictation below    Indications: Flower Lucas is an 83 y.o. female who is having surgery for Primary osteoarthritis of left knee [M17.12].  In the form of a left Nito assisted total knee arthroplasty    The patient was seen in the preoperative area. The risks, benefits, complications, treatment options, non-operative alternatives, expected recovery and outcomes were discussed with the patient. The possibilities of reaction to medication, pulmonary aspiration, injury to surrounding structures, bleeding, recurrent infection, the need for additional procedures, failure to diagnose a condition, and creating a complication requiring transfusion or operation were discussed with the patient. The patient concurred with the proposed plan, giving informed consent.  The site of surgery was properly noted/marked if necessary per policy. The patient has been actively warmed in preoperative area. Preoperative antibiotics have been ordered and given within 1 hours of incision. Venous thrombosis prophylaxis have been ordered including unilateral sequential compression device    Procedure Details: Date of surgery: 3/6/2024    Location: [facility]    Pre-Operative Diagnosis: Left knee osteoarthritis    Post-Operative Diagnosis: Left knee osteoarthritis    Procedure: Left Nito assisted total knee arthroplasty    Implants:  #1.  [place] triathlon cruciate retaining femur-size 5  2.  [place] triathlon standard tibial  baseplate-size 5  3.  [place] X.3, cruciate stabilized polyethylene-size 5 x 13 mm thickness  4.  [place] X.3, asymmetric all polyethylene patella-size A32  Components were cemented    Surgeon: Augustine Ramirez DO    First Assistant: Feliciano Vargas PA-C    Intraoperative pathology and findings/operative indications:  The patient is a pleasant 83-year-old female longstanding history of left knee pain getting progressively worse progressing to the point that greatly interfered with her quality of life.  Patient admitted to painful ambulation recurrent swelling of her knee difficulty sleeping at night difficulty going up and down stairs difficulty going from sit to stand.  Physical exam revealed antalgic range of motion of her knee trace arthritic effusion mild proximal tibial varus correctable towards neutral obvious ACL laxity appreciated on ligamentous testing.  Significant crepitance and pain noted throughout her arc of motion ambulating with an antalgic gait.  Radiographs revealed bone-on-bone osteoarthritic change of the left knee bone-on-bone apposition in the medial and patellofemoral compartments Levin view demonstrating near bone-on-bone apposition in the lateral compartment as well hypertrophic osteophytic formation especially along the medial joint line.  Bone quality appearing reasonably osteopenic/osteoporotic complete film x-ray.  With continued pain and disability related to her left knee having failed numerous attempts at conservative management over an extended period of time ultimately the patient did choose to move forward with total knee arthroplasty.  At the time of the procedure exam under anesthesia revealed range of motion approximately 0 degrees of extension flexion to roughly 130 degrees mild proximal tibial varus correctable towards neutral obvious ACL deficiency appreciated mild varus thrust noted.  Intraoperatively she was found to have a arthritic related joint effusion hypertrophic  arthritic related synovitis ACL was absent significant posterior medial tibial bone loss/wear was appreciated hypertrophic osteophytic formation was noted complete cartilage loss noted in the medial patellofemoral compartments with macerated articular cartilage about the lateral compartment macerated calcified medial lateral meniscus tissue are noted mucoid degeneration of the posterior cruciate ligament was appreciated.  Bone quality fairly osteopenic/osteoporotic consistent with the appearance of her plain film x-ray.  There is no indication of infection at the time of the procedure.    Procedure in detail:  Patient was correctly identified and marked in preoperative holding risks benefits alternatives and reasonable expectations for outcomes have previously been discussed.  Also in preoperative holding the patient received a regional block by the Department of Anesthesia.  The patient was escorted to operative suite #4 at [facility], once in the operative suite surgical pause/time-out was performed preoperative antibiotics were administered and spinal anesthetic was provided by the department of anesthesia.  Patient is positioned supine on a standard operative table bony prominences well padded nonsterile tourniquet applied to left proximal thigh.  The left lower extremity was then sterilely prepped and draped in standard fashion.  Anatomic landmarks were identified and marked with sterile marking pen.  Esmarch bandage was applied knee was flexed and tourniquet was raised to 250 millimeters of mercury.  A standard anterior incision was then created with a 10 blade beginning 3 fingerbreadths proximal to the superior pole of the patella carried distally towards the medial aspect of the tibial tubercle.  Careful dissection through the subcutaneous tissues deep to level of Stulberg's fascia was performed creating a medial flap only.  Once the extensor mechanism was adequately exposed a fresh 10 blade was  used to make a standard medial parapatellar arthrotomy.  The deep MCL and medial capsule were then elevated off the medial tibial plateau with Bovie cautery and MCL retractor was inserted.  The knee was taken to full extension suprapatellar synovitis was then sharply excised with Bovie cautery.  The patella was everted and the knee was hyperflexed.  Hoffa's fat pad was sharply excised with a 10 blade.  The anterior cruciate ligament and posterior cruciate ligament were also sacrificed with a 10 blade.  Appropriate collateral retractors were then positioned.  At this time tibial and femoral checkpoints were inserted along with Nito array pins.  Arrays were appropriately oriented.  Hip center was checked.  Registration was then performed on both the femur and the tibia in standard fashion.  Medial tibial plateau and medial femoral condyle osteophytes were removed with a curved osteotome and rongeur.  Soft tissue balancing was then performed in extension and in flexion utilizing spoons.  Intraoperative Nito templating and component positioning was then performed excellent balance was achieved in the simulation.  At this time the Nito arm assist was brought into the operative field and sequential cuts were completed in standard fashion excess bone then removed with curved osteotome and rongeur.  Medial and lateral meniscus were sharply excised with Bovie cautery.  At this point the knee was taken to 90 degrees of flexion a lamina  was placed between the tibia and the femur and posterior femoral osteophytes were removed with a curved osteotome.  The tibia was once again subluxed anteriorly and sized a size 5 tibial trial was found to be most appropriate this was then provisionally pinned into place with rotation oriented towards the medial 1/3 of the tibial tubercle.  Trial polyethylene was inserted followed by trial femoral component.  The knee was then taken through range of motion varus valgus stability was  assessed.  At this point the knee was found to achieve 0 degrees of extension to 142 degrees of flexion and the varus valgus stable at 0 degrees, 30 degrees, 60 degrees no pistoning noted at 90 degrees and the patella was found to track midline.  The knee was taken to full extension patellae everted native patella thickness was measured at 22 millimeters.  A patellar cutting clamp was then used to resect 8 millimeters of patellar bone 3 lug holes were created through the appropriate guide and a trial polyethylene button was attached.  The knee was again taken through range of motion the patella was again found to track midline.  At this point, santo arrays were removed and the trial polyethylene was removed 2 lug holes were created through the distal aspect of the trial femoral component which was then removed and the tibia was again subluxed anteriorly.  The smokestack guide was attached onto the trial tibial tray and a keel punch was passed trial tibial tray was then removed and dilating keel punch was passed.  At this point the knee was taken to full extension and copiously irrigated with sterile saline via Simpulse lavage.  A pericapsular pain injection was then provided.  At this point implants were opened, cemented was mixed.  Implants were then cemented and sequentially impacted into place and found of excellent fixation.  Excess cement removed with freer elevators.  The knee was once again taken into full extension as the cemented was allowed to cure.  The Knee was then copiously irrigated with dilute sterile betadine followed by repeat irrigation with sterile saline via Simpulse lavage.  The knee was taken through range of motion stability range of motion were found to be equal to that of the trial components.  At this point the knee was hyperflexed and the tourniquet was dropped at 73 minutes.  Hemostasis was achieved with Bovie cautery.  The knee was then taken into full extension in the medial  parapatellar arthrotomy was closed with a 0 Vicryl ligature in interrupted figure-of-eight and running locked fashion.  Deep subcutaneous tissues were closed with 2-O Vicryl ligature in buried interrupted fashion skin was reapproximated with 4 Monocryl in running subcuticular fashion followed by application of Dermabond.  A self adherent Mepilex Silver dressing was applied applied over top of the wound after the Dermabond was completely dry.  A Thigh high compressive stocking was then applied along with a iceman cooler.  Patient was then woken transferred to a hospital bed and returned to PACU in stable condition.  Counts were correct case was clean elective complications were none, tourniquet time was 73 minutes, blood loss was 20 cc post tourniquet dropped.          Complications:  None; patient tolerated the procedure well.    Disposition: PACU - hemodynamically stable.  Condition: stable         Additional Details:     Attending Attestation: I performed the procedure.    Augustine Ramirez  Phone Number: [phone]

## 2024-03-07 ENCOUNTER — HOME HEALTH ADMISSION (OUTPATIENT)
Dept: HOME HEALTH SERVICES | Facility: HOME HEALTH | Age: 84
End: 2024-03-07
Payer: MEDICARE

## 2024-03-07 ENCOUNTER — DOCUMENTATION (OUTPATIENT)
Dept: HOME HEALTH SERVICES | Facility: HOME HEALTH | Age: 84
End: 2024-03-07
Payer: MEDICARE

## 2024-03-07 VITALS
WEIGHT: 156.53 LBS | SYSTOLIC BLOOD PRESSURE: 126 MMHG | OXYGEN SATURATION: 93 % | RESPIRATION RATE: 16 BRPM | HEART RATE: 77 BPM | DIASTOLIC BLOOD PRESSURE: 61 MMHG | HEIGHT: 65 IN | BODY MASS INDEX: 26.08 KG/M2 | TEMPERATURE: 97.3 F

## 2024-03-07 LAB
ANION GAP SERPL CALC-SCNC: 11 MMOL/L (ref 10–20)
BUN SERPL-MCNC: 14 MG/DL (ref 6–23)
CALCIUM SERPL-MCNC: 7.9 MG/DL (ref 8.6–10.3)
CHLORIDE SERPL-SCNC: 110 MMOL/L (ref 98–107)
CO2 SERPL-SCNC: 24 MMOL/L (ref 21–32)
CREAT SERPL-MCNC: 0.72 MG/DL (ref 0.5–1.05)
EGFRCR SERPLBLD CKD-EPI 2021: 83 ML/MIN/1.73M*2
ERYTHROCYTE [DISTWIDTH] IN BLOOD BY AUTOMATED COUNT: 14.3 % (ref 11.5–14.5)
GLUCOSE SERPL-MCNC: 117 MG/DL (ref 74–99)
HCT VFR BLD AUTO: 38.9 % (ref 36–46)
HGB BLD-MCNC: 12.4 G/DL (ref 12–16)
MCH RBC QN AUTO: 32.6 PG (ref 26–34)
MCHC RBC AUTO-ENTMCNC: 31.9 G/DL (ref 32–36)
MCV RBC AUTO: 102 FL (ref 80–100)
NRBC BLD-RTO: 0 /100 WBCS (ref 0–0)
PLATELET # BLD AUTO: 346 X10*3/UL (ref 150–450)
POTASSIUM SERPL-SCNC: 3.8 MMOL/L (ref 3.5–5.3)
RBC # BLD AUTO: 3.8 X10*6/UL (ref 4–5.2)
SODIUM SERPL-SCNC: 141 MMOL/L (ref 136–145)
WBC # BLD AUTO: 13 X10*3/UL (ref 4.4–11.3)

## 2024-03-07 PROCEDURE — 2500000002 HC RX 250 W HCPCS SELF ADMINISTERED DRUGS (ALT 637 FOR MEDICARE OP, ALT 636 FOR OP/ED)

## 2024-03-07 PROCEDURE — 80048 BASIC METABOLIC PNL TOTAL CA: CPT

## 2024-03-07 PROCEDURE — 2500000001 HC RX 250 WO HCPCS SELF ADMINISTERED DRUGS (ALT 637 FOR MEDICARE OP)

## 2024-03-07 PROCEDURE — 2500000004 HC RX 250 GENERAL PHARMACY W/ HCPCS (ALT 636 FOR OP/ED)

## 2024-03-07 PROCEDURE — 97161 PT EVAL LOW COMPLEX 20 MIN: CPT | Mod: GP

## 2024-03-07 PROCEDURE — 36415 COLL VENOUS BLD VENIPUNCTURE: CPT

## 2024-03-07 PROCEDURE — 99232 SBSQ HOSP IP/OBS MODERATE 35: CPT | Performed by: NURSE PRACTITIONER

## 2024-03-07 PROCEDURE — 97110 THERAPEUTIC EXERCISES: CPT | Mod: GP

## 2024-03-07 PROCEDURE — 85027 COMPLETE CBC AUTOMATED: CPT

## 2024-03-07 PROCEDURE — 97116 GAIT TRAINING THERAPY: CPT | Mod: GP

## 2024-03-07 PROCEDURE — 97530 THERAPEUTIC ACTIVITIES: CPT | Mod: GP

## 2024-03-07 PROCEDURE — 7100000011 HC EXTENDED STAY RECOVERY HOURLY - NURSING UNIT

## 2024-03-07 RX ADMIN — ACETAMINOPHEN 975 MG: 325 TABLET ORAL at 05:45

## 2024-03-07 RX ADMIN — ASPIRIN 325 MG: 325 TABLET, COATED ORAL at 08:44

## 2024-03-07 RX ADMIN — ESCITALOPRAM OXALATE 10 MG: 10 TABLET ORAL at 08:43

## 2024-03-07 RX ADMIN — KETOROLAC TROMETHAMINE 15 MG: 15 INJECTION, SOLUTION INTRAMUSCULAR; INTRAVENOUS at 05:45

## 2024-03-07 RX ADMIN — CEFAZOLIN SODIUM 2 G: 2 INJECTION, SOLUTION INTRAVENOUS at 00:25

## 2024-03-07 RX ADMIN — METOPROLOL SUCCINATE 25 MG: 25 TABLET, EXTENDED RELEASE ORAL at 08:43

## 2024-03-07 RX ADMIN — SODIUM CHLORIDE, POTASSIUM CHLORIDE, SODIUM LACTATE AND CALCIUM CHLORIDE 100 ML/HR: 600; 310; 30; 20 INJECTION, SOLUTION INTRAVENOUS at 00:24

## 2024-03-07 RX ADMIN — OXYCODONE HYDROCHLORIDE 5 MG: 5 TABLET ORAL at 11:25

## 2024-03-07 RX ADMIN — ACETAMINOPHEN 975 MG: 325 TABLET ORAL at 11:24

## 2024-03-07 RX ADMIN — LAMOTRIGINE 100 MG: 100 TABLET ORAL at 08:43

## 2024-03-07 RX ADMIN — DOCUSATE SODIUM 100 MG: 100 CAPSULE, LIQUID FILLED ORAL at 08:44

## 2024-03-07 RX ADMIN — AMLODIPINE BESYLATE 5 MG: 5 TABLET ORAL at 08:43

## 2024-03-07 RX ADMIN — ZONISAMIDE 100 MG: 100 CAPSULE ORAL at 08:43

## 2024-03-07 RX ADMIN — OXYBUTYNIN CHLORIDE 5 MG: 5 TABLET ORAL at 08:44

## 2024-03-07 RX ADMIN — LEVOTHYROXINE SODIUM 112 MCG: 112 TABLET ORAL at 05:45

## 2024-03-07 RX ADMIN — ACETAMINOPHEN 975 MG: 325 TABLET ORAL at 00:25

## 2024-03-07 ASSESSMENT — COGNITIVE AND FUNCTIONAL STATUS - GENERAL
WALKING IN HOSPITAL ROOM: A LITTLE
STANDING UP FROM CHAIR USING ARMS: A LITTLE
MOVING TO AND FROM BED TO CHAIR: A LITTLE
MOBILITY SCORE: 20
CLIMB 3 TO 5 STEPS WITH RAILING: A LITTLE

## 2024-03-07 ASSESSMENT — PAIN SCALES - GENERAL
PAINLEVEL_OUTOF10: 2
PAINLEVEL_OUTOF10: 5 - MODERATE PAIN
PAINLEVEL_OUTOF10: 2
PAINLEVEL_OUTOF10: 5 - MODERATE PAIN

## 2024-03-07 ASSESSMENT — ACTIVITIES OF DAILY LIVING (ADL)
ADL_ASSISTANCE: INDEPENDENT
ADLS_ADDRESSED: YES
LACK_OF_TRANSPORTATION: NO

## 2024-03-07 ASSESSMENT — PAIN DESCRIPTION - LOCATION
LOCATION: KNEE
LOCATION: KNEE

## 2024-03-07 ASSESSMENT — PAIN DESCRIPTION - ORIENTATION
ORIENTATION: LEFT
ORIENTATION: LEFT

## 2024-03-07 ASSESSMENT — PAIN - FUNCTIONAL ASSESSMENT: PAIN_FUNCTIONAL_ASSESSMENT: 0-10

## 2024-03-07 NOTE — HH CARE COORDINATION
Home Care received a Referral for Physical Therapy. We have processed the referral for a Start of Care on 03/08.     If you have any questions or concerns, please feel free to contact us at [phone]. Follow the prompts, enter your five digit zip code, and you will be directed to your care team on [facility].

## 2024-03-07 NOTE — PROGRESS NOTES
Patient: Flower Lucas  Room/bed: 149/149-A  Admitted on: 3/6/2024    Age: 83 y.o.   Gender: female  Code Status:  Full Code   Admitting Dx: Primary osteoarthritis of left knee [M17.12]  Status post total knee replacement, left [Z96.652]    MRN: [id number]  PCP: Bhavna Costa MD       Subjective   Seen and examined in her room this AM. Dressed and up in chair awaiting discharge. Did well with PT today. She denies chest pain, breathing difficulties, abdominal pain, N/V/D/C, fever, or chills.      Objective    Physical Exam   Constitutional: A&O x 3; NAD; calm and cooperative  Eyes: EOM's intact  HEENT: Normocephalic, Atraumatic. Oral mucosa moist.   Neck: Supple. No JVD, lymphadenopathy.   Lungs: CTAB with fair air movement. Respirations even and unlabored on room air.   Heart: RRR; + mumur  Abdomen: Soft, non-tender, non-distended, +BS  MS/Extremities: KAISER with LLE pain; left knee incision No edema. Peripheral pulses intact bilaterally.   Neuro: A&O x3; no focal deficits; gross motor and sensation intact.   Skin: Warm and dry. No rashes or lesions  Psych: Normal affect.     Temp:  [36.2 °C (97.2 °F)-37.2 °C (99 °F)] 36.3 °C (97.3 °F)  Heart Rate:  [73-89] 77  Resp:  [13-20] 16  BP: (107-153)/(56-85) 126/61    Vitals:    03/06/24 0648   Weight: 71 kg (156 lb 8.4 oz)     I/Os    Intake/Output Summary (Last 24 hours) at 3/7/2024 1157  Last data filed at 3/7/2024 0055  Gross per 24 hour   Intake 683.33 ml   Output 500 ml   Net 183.33 ml       Labs:   Results from last 72 hours   Lab Units 03/07/24  0511   SODIUM mmol/L 141   POTASSIUM mmol/L 3.8   CHLORIDE mmol/L 110*   CO2 mmol/L 24   BUN mg/dL 14   CREATININE mg/dL 0.72   GLUCOSE mg/dL 117*   CALCIUM mg/dL 7.9*   ANION GAP mmol/L 11   EGFR mL/min/1.73m*2 83      Results from last 72 hours   Lab Units 03/07/24  0511   WBC AUTO x10*3/uL 13.0*   HEMOGLOBIN g/dL 12.4   HEMATOCRIT % 38.9   PLATELETS AUTO x10*3/uL 346      Lab Results   Component Value Date    CALCIUM  7.9 (L) 03/07/2024    PHOS 3.3 07/19/2023      Lab Results   Component Value Date    CRP 0.11 11/20/2019        Micro/ID:   Susceptibility data from last 90 days.  Collected Specimen Info Organism   02/19/24 Swab from Nares/Axilla/Groin Methicillin Susceptible Staphylococcus aureus (MSSA)       Images:  XR knee left 1-2 views  Narrative: Interpreted By:  Lewis Hayes,   STUDY:  XR KNEE LEFT 1-2 VIEWS;  3/6/2024 10:56 am      INDICATION:  Signs/Symptoms:Post-op knee.      COMPARISON:  Prior exam from 05/31/2019      ACCESSION NUMBER(S):  [id number]      ORDERING CLINICIAN:  VELASQUEZ COSTA      TECHNIQUE:  AP and lateral portable postoperative views  of the  left knee were  obtained.      FINDINGS:  Immediately status post  left knee arthroplasty. Femoral and tibial  components appear well seated and in good alignment.  Patellar  component is radiolucent.  There is postsurgical soft tissue air and  also edema anteriorly in the soft tissues  and in the suprapatellar  bursa.  No lytic or blastic destructive bone lesion.  No acute  fracture or dislocation.      Impression: Immediately status post  left knee arthroplasty. Postsurgical changes  as described. No acute fracture.      MACRO:  None      Signed by: Lewis Hayes 3/6/2024 12:31 PM  Dictation workstation:   [id number]       Meds    Scheduled medications  acetaminophen, 975 mg, oral, q6h AMITA  amLODIPine, 5 mg, oral, Daily  aspirin, 325 mg, oral, BID  denosumab, 60 mg, subcutaneous, Once  docusate sodium, 100 mg, oral, BID  escitalopram, 10 mg, oral, Daily  hydroCHLOROthiazide, 12.5 mg, oral, Daily  lamoTRIgine, 100 mg, oral, BID  levothyroxine, 112 mcg, oral, Daily  metoprolol succinate XL, 25 mg, oral, Daily  oxybutynin, 5 mg, oral, Daily  zonisamide, 100 mg, oral, BID      Continuous medications  lactated Ringer's, 100 mL/hr, Last Rate: Stopped (03/06/24 1018)  lactated Ringer's, 100 mL/hr, Last Rate: 100 mL/hr (03/07/24 0024)  oxygen, 2 L/min      PRN  medications  PRN medications: carisoprodol, diphenhydrAMINE, gabapentin, morphine, naloxone, ondansetron ODT **OR** ondansetron, oxyCODONE, oxyCODONE, traMADol     Assessment and Plan    83 y.o. female with a medical history of HTN, HLD, and OA who presented to [facility] for an elective left total knee arthroplasty. Consulted for medical management.      CV: HTN, PAF  -Resumed home regimen: Amlodipine, Hydrochlorothiazide, Metoprolol  -Reports that PAF was many years ago and has not recurred  -VSS     Left Knee Osteoarthritis  -S/P left TKA by Dr. Ramirez on 3/6/24  -Incisional care, antibiotics, activity restrictions per orthopedics surgery.  -PT/OT following. WBAT.   -Medicate for pain  -Maintain bowel regimen  -Advised IS use, mobilization.   -Monitor H&H for ABLA. EBL 20ml. Hgb stable at 12.4.   -DVT prophylaxis per surgery: ASA 325mg BID  -Afebrile. Mild leukocytosis.      Seizure Disorder  -Resumed Zonisamide, Lamotrigine     Anxiety/PTSD  -Escitalopram     Hypothyroidism  -On Levothyroxine    Fluids/Electrolytes/Nutrition  -Laboratory data reviewed.   -Electrolytes stable.   -No nutritional concerns at this time.       Disposition  -Plan of care discussed with medicine, Dr. Olvera, Orthopedics APRN.   -Discharge per orthopedics. Medically stable.       Mara Sun, APRN-CNP

## 2024-03-07 NOTE — CARE PLAN
The patient's goals for the shift include pain management     Problem: Pain  Goal: Free from opioid side effects throughout the shift  Outcome: Progressing     Problem: Safety  Goal: Patient will be injury free during hospitalization  Outcome: Progressing     Problem: Daily Care  Goal: Daily care needs are met  Outcome: Progressing     Problem: Psychosocial Needs  Goal: Demonstrates ability to cope with hospitalization/illness  Outcome: Progressing     Problem: Discharge Barriers  Goal: My discharge needs are met  Outcome: Progressing        The clinical goals for the shift include Patient will have pain managed to allow for at least 5 hours of sleep this shift.

## 2024-03-07 NOTE — PROGRESS NOTES
Physical Therapy    Physical Therapy Evaluation & Treatment    Patient Name: Flower Lucas  MRN: [id number]  Today's Date: 3/7/2024   Time Calculation  Start Time: 0849  Stop Time: 0945  Time Calculation (min): 56 min    Assessment/Plan   PT Assessment  PT Assessment Results: Decreased strength, Decreased range of motion, Decreased endurance, Impaired balance, Decreased mobility  Rehab Prognosis: Excellent  Evaluation/Treatment Tolerance: Patient tolerated treatment well  Medical Staff Made Aware: Yes  Strengths: Ability to acquire knowledge, Housing layout, Support of Caregivers, Rehab experience  End of Session Communication: Bedside nurse  Assessment Comment: Patient tolerated assessment well, good understanding of home going precautions and protocol. Good family support and all necessary equipment in place. Rec low intensity PT intervention.  End of Session Patient Position: Up in chair, Alarm off, not on at start of session (confirmed no alarm necessary, B LE elevated and ice to L knee)   IP OR SWING BED PT PLAN  Inpatient or Swing Bed: Inpatient  PT Plan  Treatment/Interventions: Transfer training, Gait training, Stair training, Balance training, Strengthening, Endurance training, Range of motion  PT Plan: Skilled PT  PT Eval Only Reason: Safe to return home  PT Frequency: 2 times per week  PT Discharge Recommendations: Low intensity level of continued care  Equipment Recommended upon Discharge: Wheeled walker, Straight cane (owns)  PT Recommended Transfer Status: Assist x1  PT - OK to Discharge: Yes (per PT POC)      Subjective     General Visit Information:  General  Reason for Referral: Impaired functional mobility; L TKA  Referred By: Augustine Ramirez  Past Medical History Relevant to Rehab: HTN, HPL, hypothyroid,  Family/Caregiver Present: No  Prior to Session Communication: Bedside nurse  Patient Position Received: Bed, 3 rail up, Alarm on  General Comment: Patient pleasant, cooperative and agreeable to PT  eval  Home Living:  Home Living  Type of Home: House  Lives With: Alone (DIL plans to stay with patient for the first few weeks, grandson to stay after that if necessary)  Home Adaptive Equipment: Walker rolling or standard, Cane  Home Layout: Two level, Able to live on main level with bedroom/bathroom  Home Access: Stairs to enter with rails  Entrance Stairs-Rails: Right  Entrance Stairs-Number of Steps: 3  Bathroom Shower/Tub: Walk-in shower  Bathroom Toilet: Standard  Bathroom Equipment: Shower chair with back  Prior Level of Function:  Prior Function Per Pt/Caregiver Report  Level of [place]: Independent with ADLs and functional transfers, Independent with homemaking with ambulation  ADL Assistance: Independent  Homemaking Assistance: Independent  Ambulatory Assistance: Independent  Prior Function Comments: (+) driving  Precautions:  Precautions  LE Weight Bearing Status: Weight Bearing as Tolerated  Post-Surgical Precautions: Left total knee precautions  Vital Signs:       Objective   Pain:  Pain Assessment  Pain Assessment: 0-10  Pain Score: 2  Pain Type: Surgical pain  Pain Location: Knee  Pain Orientation: Left  Pain Interventions: Cold applied, Repositioned  Cognition:  Cognition  Overall Cognitive Status: Within Functional Limits  Orientation Level: Oriented X4    General Assessments:                  Activity Tolerance  Endurance: Tolerates 30 min exercise with multiple rests    Sensation  Light Touch: No apparent deficits    Strength  Strength Comments: L hip 4/5, L knee 3+/5, L ankle 4/5; R  LE 4+/5      Coordination  Movements are Fluid and Coordinated: Yes    Postural Control  Postural Control: Within Functional Limits    Static Sitting Balance  Static Sitting-Balance Support: No upper extremity supported, Feet supported  Static Sitting-Level of Assistance: Independent    Static Standing Balance  Static Standing-Balance Support: Bilateral upper extremity supported  Static Standing-Level of  Assistance: Close supervision  Functional Assessments:  ADL  ADL's Addressed: Yes (lower dressing: mod A to initate in sitting, min A to complete in standing. Upper dressing: set up. Toileting: close supervision)    Bed Mobility  Bed Mobility: Yes  Bed Mobility 1  Bed Mobility 1: Supine to sitting  Level of Assistance 1: Distant supervision    Transfers  Transfer: Yes  Transfer 1  Transfer From 1: Sit to, Stand to  Transfer to 1: Sit, Stand  Technique 1: Sit to stand, Stand to sit  Transfer Device 1: Walker  Transfer Level of Assistance 1: Close supervision    Ambulation/Gait Training  Ambulation/Gait Training Performed: Yes  Ambulation/Gait Training 1  Surface 1: Level tile  Device 1: Rolling walker  Assistance 1: Close supervision  Quality of Gait 1:  (steady gait, verbal cues for L heel strike in stance and L knee flexion during swing)  Comments/Distance (ft) 1: 185' x 2; short distances within gym    Stairs  Stairs: Yes  Stairs  Rails 1: Right  Device 1: Single point cane  Assistance 1: Close supervision  Comment/Number of Steps 1: 4 (step to pattern, verbal cues for sequencing)    Treatments:   Patient encouraged to complete supine ther ex 3x/day. Reviewed 15  reps each: ankle pumps, quad sets, SLR, hip abd/add, heel slides, SAQ. Patient advised home care will advance ther ex as tolerated   Encouraged to take short duration ambulation bouts hourly during waking hours with the use of 2WW, focusing on gait pattern.   Educated on the importance of avoiding remaining in one position for extended period of time. Encouraged pain and edema control with use of ice, elevation and activity pacing. Patient verbalized understanding.   Reiterated no pillows/towels etc under the knee   Reviewed TKA precautions   Home going packet reviewed and provided to patient. Patient verbalized understanding.     Reviewed car transfer, patient verbalized understanding. Recommended pushing seat back as far as possible, don't use door as  support, recline seat to provide increased space for hip mobility. Sit first and then swivel into vehicle. Plans to d/c into a midsize SUV.     Bed Mobility  Bed Mobility: Yes  Bed Mobility 1  Bed Mobility 1: Supine to sitting  Level of Assistance 1: Distant supervision    Ambulation/Gait Training  Ambulation/Gait Training Performed: Yes  Ambulation/Gait Training 1  Surface 1: Level tile  Device 1: Rolling walker  Assistance 1: Close supervision  Quality of Gait 1:  (steady gait, verbal cues for L heel strike in stance and L knee flexion during swing)  Comments/Distance (ft) 1: 185' x 2; short distances within gym  Transfers  Transfer: Yes  Transfer 1  Transfer From 1: Sit to, Stand to  Transfer to 1: Sit, Stand  Technique 1: Sit to stand, Stand to sit  Transfer Device 1: Walker  Transfer Level of Assistance 1: Close supervision    Stairs  Stairs: Yes  Stairs  Rails 1: Right  Device 1: Single point cane  Assistance 1: Close supervision  Comment/Number of Steps 1: 4 (step to pattern, verbal cues for sequencing)  Outcome Measures:  [facility] Basic Mobility  Turning from your back to your side while in a flat bed without using bedrails: None  Moving from lying on your back to sitting on the side of a flat bed without using bedrails: None  Moving to and from bed to chair (including a wheelchair): A little  Standing up from a chair using your arms (e.g. wheelchair or bedside chair): A little  To walk in hospital room: A little  Climbing 3-5 steps with railing: A little  Basic Mobility - Total Score: 20    Encounter Problems       Encounter Problems (Resolved)       Mobility       Patient will demonstrate good understanding of bed mobility, transfers, ambulation, stair negotiation and HEP for safe home going.   (Met)       Start:  03/07/24    Expected End:  03/07/24    Resolved:  03/07/24                Education Documentation  Handouts, taught by Suzanne Theodore PT at 3/7/2024 10:07 AM.  Learner: Patient  Readiness:  Acceptance  Method: Explanation, Handout  Response: Verbalizes Understanding, Demonstrated Understanding    Precautions, taught by Suzanne Theodore PT at 3/7/2024 10:07 AM.  Learner: Patient  Readiness: Acceptance  Method: Explanation, Handout  Response: Verbalizes Understanding, Demonstrated Understanding    Body Mechanics, taught by Suzanne Theodore PT at 3/7/2024 10:07 AM.  Learner: Patient  Readiness: Acceptance  Method: Explanation, Handout  Response: Verbalizes Understanding, Demonstrated Understanding    Home Exercise Program, taught by Suzanne Theodore PT at 3/7/2024 10:07 AM.  Learner: Patient  Readiness: Acceptance  Method: Explanation, Handout  Response: Verbalizes Understanding, Demonstrated Understanding    Mobility Training, taught by Suzanne Theodore PT at 3/7/2024 10:07 AM.  Learner: Patient  Readiness: Acceptance  Method: Explanation, Handout  Response: Verbalizes Understanding, Demonstrated Understanding    Education Comments  No comments found.

## 2024-03-07 NOTE — PROGRESS NOTES
H&P Exam - Trauma   Mike Guo 80 y o  female MRN: [id number]  Unit/Bed#: ED 22 Encounter: [id number]    Assessment/Plan   Trauma Alert: Evaluation  Model of Arrival: Ambulance  Trauma Team: Attending Mamadou Xiao and Residents Alyssa    Trauma Active Problems:   Mechanical fall  Right orbital floor fracture  Right hand abrasion  B/l patella abrasions  Need for tetanus vaccine    Trauma Plan:     1  CT facial bone shows right orbital floor fracture with no muscular involvement  Patient has normal EOMs  No diplopia or pain elicited   -likely non op as there is no muscular involvement and patient has a normal eye exam   Will discuss with OMFS on-call    2  Patient with multiple abrasions  No bony deformities     -Will update tetanus  Chief Complaint:     History of Present Illness   HPI:  Mike Guo is a 80 y o  female with a past medical history of hypertension who presents as a level see trauma alert following a mechanical fall patient was attempting to take out her trash at home when she slipped on the sidewalk landing forward onto a plantar filled with mulch  She struck the right side of her face on the ground  She noticed blood immediately from her right naris as well as right face pain  Patient was brought in by EMS for evaluation  She denies blood thinners  Denies LOC  She remembers all the events  She has no pain with EOMs  Denies diplopia  Denies nausea, vomiting, headache She has a history of macular degeneration and has blurry vision at baseline  No change  She denies numbness, tingling, weakness in her extremities  Denies neck pain, chest pain, abdominal pain  Abrasions were noted on her bilateral knees but she has no pain  Mechanism:Fall    Review of Systems   Constitutional: Negative for appetite change, chills, diaphoresis, fatigue and fever  HENT: Positive for facial swelling and nosebleeds  Negative for congestion, rhinorrhea and sore throat      Eyes: Negative for Occupational Therapy                 Therapy Communication Note    Patient Name: Flower Lucas  MRN: [id number]  Today's Date: 3/7/2024     Discipline: Occupational Therapy    Missed Visit Reason: Missed Visit Reason: Cancel (Per PT, pt is moving well and has no skilled OT needs. Cancel OT orders.)    Missed Time: Cancel     photophobia, pain, discharge, redness, itching and visual disturbance  Respiratory: Negative for cough, shortness of breath, wheezing and stridor  Cardiovascular: Negative for chest pain, palpitations and leg swelling  Gastrointestinal: Negative for abdominal distention, abdominal pain, constipation, diarrhea, nausea and vomiting  Endocrine: Negative for polydipsia and polyuria  Genitourinary: Negative for dysuria and hematuria  Musculoskeletal: Negative for back pain, neck pain and neck stiffness  Skin: Negative for pallor, rash and wound  Neurological: Negative for dizziness, syncope, light-headedness and headaches  Psychiatric/Behavioral: Negative for behavioral problems and confusion  12-point, complete review of systems was reviewed and negative except as stated above  Historical Information   History is unobtainable from the patient due to n/a  Efforts to obtain history included the following sources: family member, other medical personnel, obtained from other records    Past Medical History:   Diagnosis Date    Hypertension      Past Surgical History:   Procedure Laterality Date    BREAST SURGERY       Social History   Social History     Substance and Sexual Activity   Alcohol Use Not Currently     Social History     Substance and Sexual Activity   Drug Use Never     Social History     Tobacco Use   Smoking Status Never Smoker   Smokeless Tobacco Never Used     Immunization History   Administered Date(s) Administered    Tdap 02/22/2020     Last Tetanus: 02/22/2020  Family History: Non-contributory  Unable to obtain/limited by n/a      Meds/Allergies   all current active meds have been reviewed and current meds:   No current facility-administered medications for this encounter          No Known Allergies      PHYSICAL EXAM    PE limited by: n/a    Objective   Vitals:   First set: Temperature: (!) 97 4 °F (36 3 °C) (02/22/20 1034)  Pulse: 60 (02/22/20 1034)  Respirations: 18 (02/22/20 1034)  Blood Pressure: (!) 185/89 (02/22/20 1034)    Primary Survey:   (A) Airway:  Intact  (B) Breathing:  Bilateral breath sounds  (C) Circulation: Pulses:   carotid  2/4, pedal  2/4, radial  2/4 and femoral  2/4  (D) Disabliity:  GCS Total:  15  (E) Expose:  Completed    Secondary Survey: (Click on Physical Exam tab above)  Physical Exam   Constitutional: She is oriented to person, place, and time  She appears well-developed and well-nourished  No distress  HENT:   Head: Normocephalic  Swelling over right maxilla, lower eyelid  No septal hematoma  No active bleeding  No hemotympanum  No Gan sign, periorbital ecchymoses   Eyes: Pupils are equal, round, and reactive to light  Conjunctivae and EOM are normal  Right eye exhibits no discharge  Left eye exhibits no discharge  No scleral icterus  No pain or diplopia elicited with EOMs  Neck: Normal range of motion  Neck supple  Cardiovascular: Normal rate, regular rhythm, normal heart sounds and intact distal pulses  No murmur heard  No chest wall tenderness palpation   Pulmonary/Chest: Effort normal and breath sounds normal  No respiratory distress  She has no wheezes  Abdominal: Soft  Bowel sounds are normal  She exhibits no distension  There is no tenderness  Musculoskeletal: Normal range of motion  She exhibits no edema, tenderness or deformity  Hands:       Legs:  No C, T, L-spine bony tenderness to palpation  No step-offs, deformities  Pelvis stable  Neurological: She is alert and oriented to person, place, and time  She has normal strength  No cranial nerve deficit or sensory deficit  Coordination and gait normal  GCS eye subscore is 4  GCS verbal subscore is 5  GCS motor subscore is 6  Moving all extremities spontaneously   Skin: Skin is warm and dry  No rash noted  She is not diaphoretic  No erythema  No pallor  Psychiatric: She has a normal mood and affect   Her behavior is normal    Nursing note and vitals reviewed  Invasive Devices     Peripheral Intravenous Line            Peripheral IV 02/22/20 Right Forearm less than 1 day                Lab Results:   BMP/CMP:   Lab Results   Component Value Date    SODIUM 134 (L) 02/22/2020    K 4 5 02/22/2020     02/22/2020    CO2 25 02/22/2020    BUN 28 (H) 02/22/2020    CREATININE 1 19 02/22/2020    CALCIUM 10 1 02/22/2020    AST 25 02/22/2020    ALT 25 02/22/2020    ALKPHOS 97 02/22/2020    EGFR 39 02/22/2020   , CBC:   Lab Results   Component Value Date    WBC 10 00 02/22/2020    HGB 14 0 02/22/2020    HCT 41 7 02/22/2020    MCV 93 02/22/2020     02/22/2020    MCH 31 3 02/22/2020    MCHC 33 6 02/22/2020    RDW 13 5 02/22/2020    MPV 9 6 02/22/2020    NRBC 0 02/22/2020    and Coagulation:   Lab Results   Component Value Date    INR 1 03 02/22/2020     Imaging/EKG Studies: Results: I have personally reviewed pertinent films in PACS   Ct Head Wo Contrast    Result Date: 2/22/2020  Impression: No acute intracranial abnormality  Right inferior orbital wall fracture with periorbital soft tissue swelling and blood products within the right maxillary sinus  Please see report for dedicated facial bone CT  I personally discussed this study with Evans Jackman on 2/22/2020 at 11:16 AM  Workstation performed: [id number]     Ct Facial Bones Without Contrast    Result Date: 2/22/2020  Impression: Right inferior orbital wall fracture with displacement of fracture fragments into the maxillary sinus  No herniation of extraocular muscle through fracture defect or retrobulbar hematoma  Periorbital soft tissue swelling and blood products within the right maxillary sinus also present  I personally discussed this study with Evans Jackman on 2/22/2020 at 11:16 AM  Workstation performed: [id number]     Ct Spine Cervical Without Contrast    Result Date: 2/22/2020  Impression: No cervical spine fracture or traumatic malalignment   Incidental thyroid nodule(s) for which nonemergent thyroid ultrasound is recommended    I personally discussed this study with Monae Becker on 2/22/2020 at 11:16 AM   Workstation performed: [id number]     Other Studies:     Code Status: No Order  Advance Directive and Living Will:      Power of :    POLST:

## 2024-03-07 NOTE — PROGRESS NOTES
03/07/24 1121   Discharge Planning   Living Arrangements Alone   Support Systems Family members;Friends/neighbors;Children   Assistance Needed Patient is from home alone but daughter-in-law will be staying with her at time of DC. Patient is A&O X3, on room air, independent with ADLs and uses no DME at baseline. Patient has walker to use post-op. Patient was driving prior to surgery. Patient voices no other DC needs other than new [facility] that referral was previously sent for prior to surgery.   Type of Residence Private residence   Number of Stairs to Enter Residence 3   Number of Stairs Within Residence 13  (to second floor but patient will stay on first floor.)   Who is requesting discharge planning? Provider   Home or Post Acute Services In home services   Type of Home Care Services Home PT   Patient expects to be discharged to: Home with new [facility]   Does the patient need discharge transport arranged? No   Financial Resource Strain   How hard is it for you to pay for the very basics like food, housing, medical care, and heating? Not hard   Housing Stability   In the last 12 months, was there a time when you were not able to pay the mortgage or rent on time? N   In the last 12 months, how many places have you lived? 1   In the last 12 months, was there a time when you did not have a steady place to sleep or slept in a shelter (including now)? N   Transportation Needs   In the past 12 months, has lack of transportation kept you from medical appointments or from getting medications? no   In the past 12 months, has lack of transportation kept you from meetings, work, or from getting things needed for daily living? No   Patient Choice   Provider Choice list and CMS website (https://medicare.gov/care-compare#search) for post-acute Quality and Resource Measure Data were provided and reviewed with: Patient   Patient / Family choosing to utilize agency / facility established prior to hospitalization No

## 2024-03-08 ENCOUNTER — HOME CARE VISIT (OUTPATIENT)
Dept: HOME HEALTH SERVICES | Facility: HOME HEALTH | Age: 84
End: 2024-03-08
Payer: MEDICARE

## 2024-03-08 VITALS — HEART RATE: 78 BPM | OXYGEN SATURATION: 95 % | SYSTOLIC BLOOD PRESSURE: 140 MMHG | DIASTOLIC BLOOD PRESSURE: 72 MMHG

## 2024-03-08 PROCEDURE — 0023 HH SOC

## 2024-03-08 PROCEDURE — 1090000002 HH PPS REVENUE DEBIT

## 2024-03-08 PROCEDURE — 1090000001 HH PPS REVENUE CREDIT

## 2024-03-08 PROCEDURE — 169592 NO-PAY CLAIM PROCEDURE

## 2024-03-08 PROCEDURE — G0151 HHCP-SERV OF PT,EA 15 MIN: HCPCS | Mod: HHH

## 2024-03-08 SDOH — HEALTH STABILITY: PHYSICAL HEALTH
EXERCISE COMMENTS: PATIENT COMPLETED THE FOLLOWING: SEATED ANKLE PUMPS, QUAD SETS, SAQ, HEEL SLIDES, HIP ABDUCTION, SLR X 15 EACH. SEATED LAQ X 15

## 2024-03-08 ASSESSMENT — ENCOUNTER SYMPTOMS
OCCASIONAL FEELINGS OF UNSTEADINESS: 1
SUBJECTIVE PAIN PROGRESSION: GRADUALLY IMPROVING
HIGHEST PAIN SEVERITY IN PAST 24 HOURS: 9/10
LIMITED RANGE OF MOTION: 1
LOWEST PAIN SEVERITY IN PAST 24 HOURS: 6/10
PERSON REPORTING PAIN: PATIENT
PAIN: 1
PAIN LOCATION: LEFT KNEE
MUSCLE WEAKNESS: 1

## 2024-03-08 ASSESSMENT — ACTIVITIES OF DAILY LIVING (ADL)
TOILETING: 1
TOILETING: MINIMUM ASSIST
AMBULATION ASSISTANCE: MINIMUM ASSIST
AMBULATION ASSISTANCE: 1
OASIS_M1830: 05
ENTERING_EXITING_HOME: MINIMUM ASSIST
AMBULATION ASSISTANCE ON FLAT SURFACES: 1

## 2024-03-08 NOTE — SIGNIFICANT EVENT
Patient here for extended recovery no call required.     - Continue Ondansetron q8hrs PRN   - Hydroxyzine q6hrs PRN breakthrough nausea

## 2024-03-09 PROCEDURE — 1090000001 HH PPS REVENUE CREDIT

## 2024-03-09 PROCEDURE — 1090000002 HH PPS REVENUE DEBIT

## 2024-03-10 LAB
ATRIAL RATE: 60 BPM
P AXIS: 67 DEGREES
P OFFSET: 197 MS
P ONSET: 137 MS
PR INTERVAL: 170 MS
Q ONSET: 222 MS
QRS COUNT: 10 BEATS
QRS DURATION: 84 MS
QT INTERVAL: 442 MS
QTC CALCULATION(BAZETT): 442 MS
QTC FREDERICIA: 442 MS
R AXIS: -10 DEGREES
T AXIS: 45 DEGREES
T OFFSET: 443 MS
VENTRICULAR RATE: 60 BPM

## 2024-03-10 PROCEDURE — 1090000002 HH PPS REVENUE DEBIT

## 2024-03-10 PROCEDURE — 1090000001 HH PPS REVENUE CREDIT

## 2024-03-11 ENCOUNTER — HOME CARE VISIT (OUTPATIENT)
Dept: HOME HEALTH SERVICES | Facility: HOME HEALTH | Age: 84
End: 2024-03-11
Payer: MEDICARE

## 2024-03-11 VITALS — HEART RATE: 61 BPM | SYSTOLIC BLOOD PRESSURE: 122 MMHG | DIASTOLIC BLOOD PRESSURE: 68 MMHG | OXYGEN SATURATION: 95 %

## 2024-03-11 PROCEDURE — 1090000002 HH PPS REVENUE DEBIT

## 2024-03-11 PROCEDURE — 1090000001 HH PPS REVENUE CREDIT

## 2024-03-11 PROCEDURE — G0151 HHCP-SERV OF PT,EA 15 MIN: HCPCS | Mod: HHH

## 2024-03-11 SDOH — HEALTH STABILITY: PHYSICAL HEALTH
EXERCISE COMMENTS: PATIENT COMPLETED THE FOLLOWING EXERCISES FOR LEFT LE STRENGTHENING AND ROM: QUAD SETS, HIP ABDUCTION, HEEL SLIDES, SAQ, LAQ X 15 EACH. STANDING: HAMSTRING CURL, SQUATS, HEEL RAISES X 15 EACH.

## 2024-03-11 ASSESSMENT — ENCOUNTER SYMPTOMS
PAIN: 1
MUSCLE WEAKNESS: 1
PERSON REPORTING PAIN: PATIENT
PAIN LOCATION - PAIN SEVERITY: 7/10
LIMITED RANGE OF MOTION: 1
PAIN LOCATION: LEFT KNEE

## 2024-03-11 ASSESSMENT — ACTIVITIES OF DAILY LIVING (ADL)
AMBULATION ASSISTANCE ON FLAT SURFACES: 1
CURRENT_FUNCTION: ONE PERSON
AMBULATION ASSISTANCE: STAND BY ASSIST

## 2024-03-12 ENCOUNTER — TELEPHONE (OUTPATIENT)
Dept: INPATIENT UNIT | Facility: HOSPITAL | Age: 84
End: 2024-03-12
Payer: MEDICARE

## 2024-03-12 PROCEDURE — 1090000001 HH PPS REVENUE CREDIT

## 2024-03-12 PROCEDURE — 1090000002 HH PPS REVENUE DEBIT

## 2024-03-13 ENCOUNTER — HOME CARE VISIT (OUTPATIENT)
Dept: HOME HEALTH SERVICES | Facility: HOME HEALTH | Age: 84
End: 2024-03-13
Payer: MEDICARE

## 2024-03-13 VITALS — OXYGEN SATURATION: 96 % | HEART RATE: 60 BPM | SYSTOLIC BLOOD PRESSURE: 142 MMHG | DIASTOLIC BLOOD PRESSURE: 64 MMHG

## 2024-03-13 PROCEDURE — 1090000001 HH PPS REVENUE CREDIT

## 2024-03-13 PROCEDURE — 1090000002 HH PPS REVENUE DEBIT

## 2024-03-13 PROCEDURE — G0151 HHCP-SERV OF PT,EA 15 MIN: HCPCS | Mod: HHH

## 2024-03-13 SDOH — HEALTH STABILITY: PHYSICAL HEALTH
EXERCISE COMMENTS: PATIENT COMPLETED THE FOLLOWING EXERCISES: SEATED ANKLE PUMPS, HEEL SLIDES, QUAD SETS, SAQ, AND SLR X 15 EACH. STANDING: MARCHES, HIP ABDUCTION, HIP EXTENSION, LEG CURLS, SQUATS, AND HEEL RAISES X 15 EACH.

## 2024-03-13 ASSESSMENT — ACTIVITIES OF DAILY LIVING (ADL)
CURRENT_FUNCTION: ONE PERSON
AMBULATION ASSISTANCE: STAND BY ASSIST
AMBULATION ASSISTANCE ON FLAT SURFACES: 1

## 2024-03-13 ASSESSMENT — ENCOUNTER SYMPTOMS
PAIN: 1
PAIN LOCATION: LEFT KNEE
MUSCLE WEAKNESS: 1
LIMITED RANGE OF MOTION: 1
PAIN LOCATION - PAIN SEVERITY: 4/10

## 2024-03-14 PROCEDURE — 1090000002 HH PPS REVENUE DEBIT

## 2024-03-14 PROCEDURE — 1090000001 HH PPS REVENUE CREDIT

## 2024-03-15 PROCEDURE — 1090000001 HH PPS REVENUE CREDIT

## 2024-03-15 PROCEDURE — 1090000002 HH PPS REVENUE DEBIT

## 2024-03-16 PROCEDURE — 1090000001 HH PPS REVENUE CREDIT

## 2024-03-16 PROCEDURE — 1090000002 HH PPS REVENUE DEBIT

## 2024-03-17 PROCEDURE — 1090000002 HH PPS REVENUE DEBIT

## 2024-03-17 PROCEDURE — 1090000001 HH PPS REVENUE CREDIT

## 2024-03-18 ENCOUNTER — HOME CARE VISIT (OUTPATIENT)
Dept: HOME HEALTH SERVICES | Facility: HOME HEALTH | Age: 84
End: 2024-03-18
Payer: MEDICARE

## 2024-03-18 VITALS
RESPIRATION RATE: 18 BRPM | DIASTOLIC BLOOD PRESSURE: 60 MMHG | HEART RATE: 57 BPM | SYSTOLIC BLOOD PRESSURE: 110 MMHG | OXYGEN SATURATION: 96 %

## 2024-03-18 PROCEDURE — 1090000001 HH PPS REVENUE CREDIT

## 2024-03-18 PROCEDURE — G0151 HHCP-SERV OF PT,EA 15 MIN: HCPCS | Mod: HHH

## 2024-03-18 PROCEDURE — 1090000002 HH PPS REVENUE DEBIT

## 2024-03-18 SDOH — HEALTH STABILITY: PHYSICAL HEALTH
EXERCISE COMMENTS: PATIENT COMPLETED THE FOLLOWING EXERCISES: SUPINE QUAD SETS, SEATED HEEL SLIDES, LAQ, AND SIT TO STAND X 10 EACH. STANDING: HEEL RAISES, HIP ABDUCTION, HIP EXTENSION, SQUATS, HAMSTRING CURLS X 15 EACH.

## 2024-03-18 ASSESSMENT — ENCOUNTER SYMPTOMS
PAIN: 1
HIGHEST PAIN SEVERITY IN PAST 24 HOURS: 9/10
PAIN LOCATION: LEFT KNEE
LOWEST PAIN SEVERITY IN PAST 24 HOURS: 0/10

## 2024-03-19 ENCOUNTER — APPOINTMENT (OUTPATIENT)
Dept: RADIOLOGY | Facility: HOSPITAL | Age: 84
End: 2024-03-19
Payer: MEDICARE

## 2024-03-19 ENCOUNTER — TELEPHONE (OUTPATIENT)
Dept: PRIMARY CARE | Facility: CLINIC | Age: 84
End: 2024-03-19
Payer: MEDICARE

## 2024-03-19 ENCOUNTER — HOSPITAL ENCOUNTER (EMERGENCY)
Facility: HOSPITAL | Age: 84
Discharge: HOME | End: 2024-03-19
Payer: MEDICARE

## 2024-03-19 VITALS
OXYGEN SATURATION: 96 % | SYSTOLIC BLOOD PRESSURE: 153 MMHG | DIASTOLIC BLOOD PRESSURE: 73 MMHG | HEART RATE: 66 BPM | TEMPERATURE: 97.9 F | WEIGHT: 155 LBS | HEIGHT: 65 IN | RESPIRATION RATE: 16 BRPM | BODY MASS INDEX: 25.83 KG/M2

## 2024-03-19 DIAGNOSIS — K59.03 DRUG-INDUCED CONSTIPATION: Primary | ICD-10-CM

## 2024-03-19 PROCEDURE — 74018 RADEX ABDOMEN 1 VIEW: CPT | Performed by: RADIOLOGY

## 2024-03-19 PROCEDURE — 1090000001 HH PPS REVENUE CREDIT

## 2024-03-19 PROCEDURE — 99283 EMERGENCY DEPT VISIT LOW MDM: CPT

## 2024-03-19 PROCEDURE — 1090000002 HH PPS REVENUE DEBIT

## 2024-03-19 PROCEDURE — 2500000004 HC RX 250 GENERAL PHARMACY W/ HCPCS (ALT 636 FOR OP/ED): Performed by: PHYSICIAN ASSISTANT

## 2024-03-19 PROCEDURE — 74018 RADEX ABDOMEN 1 VIEW: CPT

## 2024-03-19 RX ORDER — POLYETHYLENE GLYCOL 3350 17 G/17G
17 POWDER, FOR SOLUTION ORAL ONCE
Status: COMPLETED | OUTPATIENT
Start: 2024-03-19 | End: 2024-03-19

## 2024-03-19 RX ORDER — POLYETHYLENE GLYCOL 3350 17 G/17G
17 POWDER, FOR SOLUTION ORAL DAILY
Qty: 3 PACKET | Refills: 0 | Status: SHIPPED | OUTPATIENT
Start: 2024-03-19 | End: 2024-03-22

## 2024-03-19 RX ORDER — GLYCERIN 1 G/1
1 SUPPOSITORY RECTAL DAILY PRN
Status: DISCONTINUED | OUTPATIENT
Start: 2024-03-19 | End: 2024-03-19 | Stop reason: HOSPADM

## 2024-03-19 RX ADMIN — POLYETHYLENE GLYCOL 3350 17 G: 17 POWDER, FOR SOLUTION ORAL at 15:46

## 2024-03-19 ASSESSMENT — LIFESTYLE VARIABLES
EVER FELT BAD OR GUILTY ABOUT YOUR DRINKING: NO
EVER HAD A DRINK FIRST THING IN THE MORNING TO STEADY YOUR NERVES TO GET RID OF A HANGOVER: NO
HAVE YOU EVER FELT YOU SHOULD CUT DOWN ON YOUR DRINKING: NO
HAVE PEOPLE ANNOYED YOU BY CRITICIZING YOUR DRINKING: NO

## 2024-03-19 ASSESSMENT — COLUMBIA-SUICIDE SEVERITY RATING SCALE - C-SSRS
2. HAVE YOU ACTUALLY HAD ANY THOUGHTS OF KILLING YOURSELF?: NO
6. HAVE YOU EVER DONE ANYTHING, STARTED TO DO ANYTHING, OR PREPARED TO DO ANYTHING TO END YOUR LIFE?: NO
1. IN THE PAST MONTH, HAVE YOU WISHED YOU WERE DEAD OR WISHED YOU COULD GO TO SLEEP AND NOT WAKE UP?: NO

## 2024-03-19 ASSESSMENT — PAIN DESCRIPTION - PAIN TYPE: TYPE: SURGICAL PAIN

## 2024-03-19 ASSESSMENT — PAIN DESCRIPTION - LOCATION: LOCATION: KNEE

## 2024-03-19 ASSESSMENT — PAIN - FUNCTIONAL ASSESSMENT: PAIN_FUNCTIONAL_ASSESSMENT: 0-10

## 2024-03-19 ASSESSMENT — PAIN SCALES - GENERAL: PAINLEVEL_OUTOF10: 6

## 2024-03-19 ASSESSMENT — PAIN DESCRIPTION - ORIENTATION: ORIENTATION: LEFT

## 2024-03-19 NOTE — DISCHARGE INSTRUCTIONS
Please take MiraLAX and discontinue any opiate or narcotic prescriptions as this may slow colonic transit and cause constipation.  Present back to ER if you develop any abdominal swelling, distention, or or unable to pass gas.

## 2024-03-19 NOTE — ED PROVIDER NOTES
"HPI   Chief Complaint   Patient presents with    Constipation     Pt had surgery two weeks ago and has been taking oxycodone and has been taking one colace a day. Pt has not had a bowel movement since before her surgery and has been eating and drinking normally. Pt has used enemas, prune juice, and suppositories to no relief.       Patient is an 83-year-old female presenting for evaluation of constipation for the past 2 weeks.  Patient had knee surgery 2 weeks ago and has been taking oxycodone twice daily since then for pain.  She states since the surgery she has had a hard time having bowel movements.  She states she has been taking Colace once daily and has tried suppositories with no relief.  She states that she has been passing gas and has passed a couple \"hard sung\" since the surgery but otherwise has had no full bowel movements.  She does have history of an appendectomy in grade school but no other previous abdominal surgeries.  She states she sometimes feels abdominal discomfort due to her constipation but does not feel she is distended.  She is eating and drinking normally.  No other acute complaints.                        [place] Coma Scale Score: 15                     Patient History   Past Medical History:   Diagnosis Date    Abnormal finding of blood chemistry, unspecified 10/04/2016    Abnormal blood chemistry    Anxiety     Arrhythmia     Arthritis     Broken tooth     left lower    Disorder of bone, unspecified 04/26/2016    Disorder of bone and articular cartilage    Disorder of bone, unspecified 02/11/2021    Humerus lesion, left    Displaced comminuted supracondylar fracture without intercondylar fracture of right humerus, initial encounter for closed fracture 08/13/2021    Closed displaced comminuted supracondylar fracture of right humerus without intercondylar fracture, initial encounter    Encounter for general adult medical examination without abnormal findings 08/03/2020    Medicare " annual wellness visit, initial    Epistaxis 04/25/2022    Frequent epistaxis    Flatulence 04/25/2022    Excessive gas    Hypo-osmolality and hyponatremia 09/20/2016    Hyponatremia    Hypothyroidism     Other abnormality of red blood cells 08/13/2021    Elevated hematocrit    Other displaced fracture of upper end of left humerus, sequela 08/13/2021    Other closed displaced fracture of proximal end of left humerus, sequela    Pain in left hip 08/13/2021    Hip pain, left    Pain in left knee 08/13/2021    Knee pain, left    Pain in unspecified joint 08/13/2021    Pain in joint involving multiple sites    Personal history of diseases of the skin and subcutaneous tissue 02/25/2020    History of sebaceous cyst    Personal history of other diseases of the circulatory system     History of hypertension    Personal history of other diseases of the digestive system 12/08/2015    History of constipation    Personal history of other diseases of the nervous system and sense organs     History of cataract    Personal history of other drug therapy 04/26/2016    History of pneumococcal vaccination    Personal history of other drug therapy 11/01/2021    History of influenza vaccination    Personal history of other drug therapy 10/27/2018    History of influenza vaccination    Personal history of other endocrine, nutritional and metabolic disease 03/08/2018    History of thyroid nodule    Personal history of other mental and behavioral disorders 08/05/2020    History of adjustment disorder    Personal history of other specified conditions 11/30/2020    History of insomnia    Personal history of other specified conditions 12/28/2020    History of fatigue    Polyp of colon 12/08/2015    Hyperplastic colon polyp    Presence of spectacles and contact lenses     Wears glasses    Sebaceous cyst 05/17/2018    Infected sebaceous cyst    Seizure disorder (CMS/HCC)     Sleep apnea     Unspecified fall, initial encounter 08/13/2021     Accidental fall, initial encounter     Past Surgical History:   Procedure Laterality Date    APPENDECTOMY      ruptured with peritonitis as a child    CATARACT EXTRACTION  2018    Cataract Surgery    COLONOSCOPY  2015    Complete Colonoscopy    HIP ARTHROPLASTY Left     MASTECTOMY Left     with  reconstruction    OTHER SURGICAL HISTORY  2020    Mohs surgery nose    OTHER SURGICAL HISTORY Left     Humerus fracture repair    REVISION TOTAL HIP ARTHROPLASTY Left 2023    TOTAL KNEE ARTHROPLASTY Right     Knee Replacement     Family History   Problem Relation Name Age of Onset    Other (Malignant Neoplasm) Mother      Lung cancer Mother      Other (Laryngeal Cancer) Father      Other (Malignant Neoplasm) Father       Social History     Tobacco Use    Smoking status: Former     Types: Cigarettes     Quit date:      Years since quittin.2    Smokeless tobacco: Never   Vaping Use    Vaping Use: Never used   Substance Use Topics    Alcohol use: Not Currently     Comment: occasionally    Drug use: Never       Physical Exam   ED Triage Vitals [24 1525]   Temperature Heart Rate Respirations BP   36.6 °C (97.9 °F) 66 16 153/73      Pulse Ox Temp Source Heart Rate Source Patient Position   96 % Temporal Monitor Sitting      BP Location FiO2 (%)     Right arm --       Physical Exam  Vitals and nursing note reviewed.   Constitutional:       General: She is not in acute distress.     Appearance: She is well-developed.   HENT:      Head: Normocephalic and atraumatic.   Eyes:      Conjunctiva/sclera: Conjunctivae normal.   Cardiovascular:      Rate and Rhythm: Normal rate and regular rhythm.      Heart sounds: No murmur heard.  Pulmonary:      Effort: Pulmonary effort is normal. No respiratory distress.      Breath sounds: Normal breath sounds.   Abdominal:      Comments: Abdomen is soft, nondistended, nontender to palpation.   Musculoskeletal:         General: No swelling.      Cervical back:  Neck supple.   Skin:     General: Skin is warm and dry.      Capillary Refill: Capillary refill takes less than 2 seconds.   Neurological:      Mental Status: She is alert.   Psychiatric:         Mood and Affect: Mood normal.         ED Course & MDM   Diagnoses as of 03/20/24 0844   Drug-induced constipation       Medical Decision Making  Parts of this chart have been completed using voice recognition software. Please excuse any errors of transcription.  My thought process and reason for plan has been formulated from the time that I saw the patient until the time of disposition and is not specific to one specific moment during their visit and furthermore my MDM encompasses this entire chart and not only this text box.      HPI: Detailed above.    Exam: A medically appropriate exam performed, outlined above, given the known history and presentation.    History obtained from: Patient    Medications given during visit:  Medications   polyethylene glycol (Glycolax, Miralax) packet 17 g (17 g oral Given 3/19/24 1546)        Diagnostic/tests  Labs Reviewed - No data to display   XR abdomen 1 view   Final Result   1.  Large amounts of stool throughout the entire colon.        MACRO:   None        Signed by: Donald Lassiter 3/19/2024 4:19 PM   Dictation workstation:   [id number]           Considerations/further MDM:  83-year-old well-appearing female presenting for evaluation of constipation.  During the ER visit the patient is in no acute distress with resting comfortably in exam chair.  Her vital signs are within normal limits during the visit.  On exam, the patient's abdomen is soft, nondistended and nontender to palpation.  There does not appear to be stool at the rectal vault that is able to be disimpacted at this time. The patient does admit to passing gas and having hard pebbly bowel movements thus my suspicion is low for bowel obstruction at this time.  Patient was provided MiraLAX during the ER visit.  X-ray of  the abdomen was significant for large amounts of stool throughout the entire colon without evidence of free air.  Given the patient is well-appearing with an unremarkable abdominal exam I do not believe further imaging studies are warranted during this visit.  This was discussed with the patient and she states her agreement.  She was educated on the effects of opioids on colonic transit time.  She was instructed to follow-up with her primary care provider within 24 to 48 hours regarding her constipation.  She was provided a prescription for MiraLAX for her symptoms.  She was instructed to present back to ER if she develops worsening of symptoms or new symptoms such as distended abdomen, inability to pass gas, nausea, vomiting.  She states her understanding of the treatment plan and is agreeable.      Procedure  Procedures     Kimberlyn Marin PA-C  03/20/24 0848

## 2024-03-19 NOTE — TELEPHONE ENCOUNTER
Pt has been constipated for the last 2 weeks no laxative or prune juice has worked she is seeing Ruth Leggett PT this week but would like to know what to do.

## 2024-03-20 ENCOUNTER — HOME CARE VISIT (OUTPATIENT)
Dept: HOME HEALTH SERVICES | Facility: HOME HEALTH | Age: 84
End: 2024-03-20
Payer: MEDICARE

## 2024-03-20 VITALS
RESPIRATION RATE: 18 BRPM | DIASTOLIC BLOOD PRESSURE: 68 MMHG | SYSTOLIC BLOOD PRESSURE: 130 MMHG | HEART RATE: 72 BPM | OXYGEN SATURATION: 96 %

## 2024-03-20 PROCEDURE — 1090000002 HH PPS REVENUE DEBIT

## 2024-03-20 PROCEDURE — 1090000001 HH PPS REVENUE CREDIT

## 2024-03-20 PROCEDURE — G0151 HHCP-SERV OF PT,EA 15 MIN: HCPCS | Mod: HHH

## 2024-03-20 SDOH — HEALTH STABILITY: PHYSICAL HEALTH
EXERCISE COMMENTS: PATIENT COMPLETED THE FOLLOWING EXERCISES: ADDED STAIR STRETCHES FOR KNEE FLEXION AND CALF STRETCH. SEATED LAQ, AND HEEL SLIDES. STANDING: MARCHES, HIP ABDUCTION, HIP EXTENSION, SQUATS, HEEL RAISES, AND LEG CURLS X 15 EACH.

## 2024-03-20 ASSESSMENT — ACTIVITIES OF DAILY LIVING (ADL)
AMBULATION ASSISTANCE: STAND BY ASSIST
CURRENT_FUNCTION: STAND BY ASSIST

## 2024-03-20 ASSESSMENT — ENCOUNTER SYMPTOMS
HIGHEST PAIN SEVERITY IN PAST 24 HOURS: 8/10
PERSON REPORTING PAIN: PATIENT
PAIN LOCATION: LEFT KNEE
LIMITED RANGE OF MOTION: 1
MUSCLE WEAKNESS: 1
PAIN LOCATION - PAIN SEVERITY: 4/10
PAIN: 1

## 2024-03-21 PROCEDURE — 1090000001 HH PPS REVENUE CREDIT

## 2024-03-21 PROCEDURE — 1090000002 HH PPS REVENUE DEBIT

## 2024-03-22 ENCOUNTER — OFFICE VISIT (OUTPATIENT)
Dept: PRIMARY CARE | Facility: CLINIC | Age: 84
End: 2024-03-22
Payer: MEDICARE

## 2024-03-22 VITALS
WEIGHT: 161 LBS | SYSTOLIC BLOOD PRESSURE: 113 MMHG | HEIGHT: 65 IN | OXYGEN SATURATION: 95 % | HEART RATE: 60 BPM | DIASTOLIC BLOOD PRESSURE: 66 MMHG | BODY MASS INDEX: 26.82 KG/M2

## 2024-03-22 DIAGNOSIS — Z96.652 STATUS POST TOTAL KNEE REPLACEMENT, LEFT: ICD-10-CM

## 2024-03-22 DIAGNOSIS — K59.03 DRUG-INDUCED CONSTIPATION: Primary | ICD-10-CM

## 2024-03-22 PROCEDURE — 3078F DIAST BP <80 MM HG: CPT

## 2024-03-22 PROCEDURE — 1159F MED LIST DOCD IN RCRD: CPT

## 2024-03-22 PROCEDURE — 3074F SYST BP LT 130 MM HG: CPT

## 2024-03-22 PROCEDURE — 1160F RVW MEDS BY RX/DR IN RCRD: CPT

## 2024-03-22 PROCEDURE — 1090000001 HH PPS REVENUE CREDIT

## 2024-03-22 PROCEDURE — 1090000002 HH PPS REVENUE DEBIT

## 2024-03-22 PROCEDURE — 99213 OFFICE O/P EST LOW 20 MIN: CPT

## 2024-03-22 PROCEDURE — 1036F TOBACCO NON-USER: CPT

## 2024-03-22 RX ORDER — ACETAMINOPHEN 325 MG/1
TABLET ORAL EVERY 6 HOURS PRN
COMMUNITY

## 2024-03-22 ASSESSMENT — PATIENT HEALTH QUESTIONNAIRE - PHQ9
1. LITTLE INTEREST OR PLEASURE IN DOING THINGS: NOT AT ALL
SUM OF ALL RESPONSES TO PHQ9 QUESTIONS 1 AND 2: 0
2. FEELING DOWN, DEPRESSED OR HOPELESS: NOT AT ALL

## 2024-03-22 ASSESSMENT — ENCOUNTER SYMPTOMS
OCCASIONAL FEELINGS OF UNSTEADINESS: 0
LOSS OF SENSATION IN FEET: 0
DEPRESSION: 0

## 2024-03-22 NOTE — PROGRESS NOTES
"Subjective   Patient ID: Flower Lucas is a 83 y.o. female who presents for Follow-up (From ER - constipation from oxycodone from knee surgery /Medication haven't been working ).    HPI   Flower is a Dr. Costa patient seen with her DIL for ED follow up. Was seen on 3/19 and found to be constipated due to medications. No BM for 14 days. Was taking Oxycodone BID after knee surgery on 3/6. Was prescribed Miralax and sent home. BM have been normal since discharge. Is trying to drink 4 water bottles a day, DIL has been trying to remind her but is not there to keep track always. No longer taking Oxycodone. Denies nausea, vomiting, fevers, chills, diarrhea, abdominal pain.     Review of Systems  All other systems have been reviewed and are negative except as noted in the HPI.     Objective   /66 (BP Location: Left arm, Patient Position: Sitting, BP Cuff Size: Small adult)   Pulse 60   Ht 1.651 m (5' 5\")   Wt 73 kg (161 lb)   LMP  (LMP Unknown)   SpO2 95%   BMI 26.79 kg/m²     Physical Exam  Vitals and nursing note reviewed.   Constitutional:       General: She is not in acute distress.  Eyes:      Extraocular Movements: Extraocular movements intact.      Conjunctiva/sclera: Conjunctivae normal.   Cardiovascular:      Rate and Rhythm: Normal rate and regular rhythm.   Pulmonary:      Effort: Pulmonary effort is normal.      Breath sounds: Normal breath sounds.   Abdominal:      General: Abdomen is flat. Bowel sounds are normal. There is no distension.      Tenderness: There is no abdominal tenderness.   Musculoskeletal:         General: Normal range of motion.      Cervical back: Neck supple.   Skin:     General: Skin is warm.   Neurological:      Mental Status: She is alert. Mental status is at baseline.   Psychiatric:         Mood and Affect: Mood normal.       Assessment/Plan   Problem List Items Addressed This Visit             ICD-10-CM    Status post total knee replacement, left Z96.652     Acute. Follow " up with orthopedics. PT as scheduled.         Drug-induced constipation - Primary K59.03     Acute. Continue Miralax, Colace as directed. Increase fluids. OTC fiber supplement. Follow up with PCP in 3 months.

## 2024-03-23 PROCEDURE — 1090000001 HH PPS REVENUE CREDIT

## 2024-03-23 PROCEDURE — 1090000002 HH PPS REVENUE DEBIT

## 2024-03-24 PROBLEM — K59.03 DRUG-INDUCED CONSTIPATION: Status: ACTIVE | Noted: 2024-03-24

## 2024-03-24 PROCEDURE — 1090000002 HH PPS REVENUE DEBIT

## 2024-03-24 PROCEDURE — 1090000001 HH PPS REVENUE CREDIT

## 2024-03-25 ENCOUNTER — HOME CARE VISIT (OUTPATIENT)
Dept: HOME HEALTH SERVICES | Facility: HOME HEALTH | Age: 84
End: 2024-03-25
Payer: MEDICARE

## 2024-03-25 VITALS
SYSTOLIC BLOOD PRESSURE: 122 MMHG | RESPIRATION RATE: 18 BRPM | OXYGEN SATURATION: 96 % | HEART RATE: 60 BPM | DIASTOLIC BLOOD PRESSURE: 62 MMHG

## 2024-03-25 PROCEDURE — 1090000002 HH PPS REVENUE DEBIT

## 2024-03-25 PROCEDURE — G0151 HHCP-SERV OF PT,EA 15 MIN: HCPCS | Mod: HHH

## 2024-03-25 PROCEDURE — 1090000001 HH PPS REVENUE CREDIT

## 2024-03-25 SDOH — HEALTH STABILITY: PHYSICAL HEALTH
EXERCISE COMMENTS: PATIENT COMPLETED THE FOLLOWING: SEATED LAQ, HEEL SLIDES. STANDING: HIP FLEXION, HIP ABDUCTION, HIP EXTENSION, SQUATS, HEEL RAISES, LEG CURLS X 15 EACH.

## 2024-03-25 ASSESSMENT — ENCOUNTER SYMPTOMS
PAIN LOCATION: LEFT KNEE
LOWEST PAIN SEVERITY IN PAST 24 HOURS: 0/10
PAIN: 1
HIGHEST PAIN SEVERITY IN PAST 24 HOURS: 6/10
LIMITED RANGE OF MOTION: 1
MUSCLE WEAKNESS: 1

## 2024-03-25 ASSESSMENT — ACTIVITIES OF DAILY LIVING (ADL)
AMBULATION ASSISTANCE ON FLAT SURFACES: 1
CURRENT_FUNCTION: STAND BY ASSIST
AMBULATION ASSISTANCE ON UNEVEN SURFACES: 1
AMBULATION ASSISTANCE: STAND BY ASSIST

## 2024-03-25 NOTE — ASSESSMENT & PLAN NOTE
Acute. Continue Miralax, Colace as directed. Increase fluids. OTC fiber supplement. Follow up with PCP in 3 months.

## 2024-03-26 PROCEDURE — 1090000001 HH PPS REVENUE CREDIT

## 2024-03-26 PROCEDURE — 1090000002 HH PPS REVENUE DEBIT

## 2024-03-27 ENCOUNTER — HOME CARE VISIT (OUTPATIENT)
Dept: HOME HEALTH SERVICES | Facility: HOME HEALTH | Age: 84
End: 2024-03-27
Payer: MEDICARE

## 2024-03-27 VITALS — SYSTOLIC BLOOD PRESSURE: 108 MMHG | DIASTOLIC BLOOD PRESSURE: 62 MMHG | OXYGEN SATURATION: 94 % | HEART RATE: 66 BPM

## 2024-03-27 PROCEDURE — G0151 HHCP-SERV OF PT,EA 15 MIN: HCPCS | Mod: HHH

## 2024-03-27 PROCEDURE — 1090000001 HH PPS REVENUE CREDIT

## 2024-03-27 PROCEDURE — 1090000002 HH PPS REVENUE DEBIT

## 2024-03-27 ASSESSMENT — ACTIVITIES OF DAILY LIVING (ADL)
OASIS_M1830: 01
AMBULATION ASSISTANCE: INDEPENDENT
HOME_HEALTH_OASIS: 00
CURRENT_FUNCTION: INDEPENDENT
AMBULATION ASSISTANCE: 1
PHYSICAL TRANSFERS ASSESSED: 1

## 2024-03-27 ASSESSMENT — ENCOUNTER SYMPTOMS
PAIN LOCATION - PAIN SEVERITY: 4/10
PAIN: 1
PAIN LOCATION: LEFT KNEE
PERSON REPORTING PAIN: PATIENT

## 2024-04-01 DIAGNOSIS — I10 HYPERTENSION, UNSPECIFIED TYPE: ICD-10-CM

## 2024-04-01 DIAGNOSIS — F43.10 PTSD (POST-TRAUMATIC STRESS DISORDER): ICD-10-CM

## 2024-04-01 DIAGNOSIS — N32.81 OAB (OVERACTIVE BLADDER): ICD-10-CM

## 2024-04-01 RX ORDER — METOPROLOL SUCCINATE 25 MG/1
25 TABLET, EXTENDED RELEASE ORAL DAILY
Qty: 90 TABLET | Refills: 2 | Status: SHIPPED | OUTPATIENT
Start: 2024-04-01

## 2024-04-01 RX ORDER — OXYBUTYNIN CHLORIDE 5 MG/1
5 TABLET ORAL DAILY
Qty: 90 TABLET | Refills: 2 | Status: SHIPPED | OUTPATIENT
Start: 2024-04-01

## 2024-04-01 RX ORDER — LAMOTRIGINE 100 MG/1
100 TABLET ORAL 2 TIMES DAILY
Qty: 180 TABLET | Refills: 0 | Status: SHIPPED | OUTPATIENT
Start: 2024-04-01 | End: 2024-05-31

## 2024-04-02 ENCOUNTER — EVALUATION (OUTPATIENT)
Dept: PHYSICAL THERAPY | Facility: CLINIC | Age: 84
End: 2024-04-02
Payer: MEDICARE

## 2024-04-02 DIAGNOSIS — Z96.652 STATUS POST TOTAL KNEE REPLACEMENT, LEFT: Primary | ICD-10-CM

## 2024-04-02 DIAGNOSIS — M17.12 UNILATERAL PRIMARY OSTEOARTHRITIS, LEFT KNEE: ICD-10-CM

## 2024-04-02 PROCEDURE — 97162 PT EVAL MOD COMPLEX 30 MIN: CPT | Mod: GP

## 2024-04-02 ASSESSMENT — ENCOUNTER SYMPTOMS
DEPRESSION: 1
OCCASIONAL FEELINGS OF UNSTEADINESS: 0
LOSS OF SENSATION IN FEET: 0

## 2024-04-02 NOTE — PROGRESS NOTES
Physical Therapy    Physical Therapy Evaluation and Treatment    Patient Name: Flower Lucas  MRN: [id number]  Today's Date: 4/2/2024    Time Calculation  Start Time: 1315  Stop Time: 1350  Time Calculation (min): 35 min    Current Problem:   1. Status post total knee replacement, left        2. Unilateral primary osteoarthritis, left knee  Referral to Physical Therapy          Relevant Past Medical History: pre-DM, HTN, seizure disorder, thyroid disorder  Surgical History: L ISMA  Medications: see scanned history form     Precautions:   STEADI Fall Risk: 8 (score of 4+ indicates fall risk)  WBAT L LE  Left ISMA       SUBJECTIVE:   S/p Left TKR on 3/6/24  Completed 2 weeks of home therapy  Straight cane for household distances, walker for community distances  Pain over anteromedial knee, increases when bending knee  Ice, Tylenol for pain   Pillow and ice for positioning to help her sleep  Lives alone, grandson will be living with her next few weeks  Able to go down the stairs one step at a time     Pain:   Current: 0/10  Highest: 3/10    Patient/Family Goal: Reuse of leg including knee as in pre surgery, when knee wasn't bothering me    Outcome Measures: WOMAC 12.5%    OBJECTIVE:    Integumentary: Incision healing well, no concerns; mild skin irritation medial to incision likely from bandage     Left  Knee AROM: Lacking 3 deg ext - 104 deg flex    Left Knee Strength: Impaired    Gait with cane: Reciprocal gait with normal speed  Gait without device: Reciprocal gait with slower speed, no apparent unsteadiness or LOB    Sit to stand (no UE) in 30 sec: x 3 reps    TUG, no device: 14 sec    OP Education:   Do not apply lotion over irritation skin just medial to incision  Scar massage around incision okay  Continue with HEP issued by home health     HEP:  Continue with HEP issued by home health    Response to treatment: Sore after objective testing    ASSESSMENT:   83 yoF s/p Left TKR on 3/6/24. Doing well with no post op  complications. May continue to wean off of cane based on TUG score and steady gait pattern without cane. Continue with HEP issued by home health at this time. Could benefit from physical therapy to improve ROM, strength, functional mobility and ADL independence.     PLAN:  OP PT PLAN:  Treatment/Interventions: Cryotherapy , Gait training , Manual Therapy  , Neuromuscular re-education , Therapeutic activities , and Therapeutic exercise    PT Plan: Skilled PT   PT Frequency: 2 times per week   Duration: 6 weeks  Certification Period Start Date: pending auth  Certification Period End Date: pending auth  Visits Approved: pending auth  Rehab Potential: Good  Plan of Care Agreement: Patient         Goals:   Pt will report 1/10 pain at worst with prolonged walking and standing  Pt will independent with all ADL's and household chores to resume living alone without live-in family support  Left knee AROM will be 0-120 or greater to achieve maximal ROM outcomes  Left knee strength will be strong and painfree  TUG without device will be < 13 sec  Sit to stand 30 sec (without UE) >4 reps  WOMAC < 5%

## 2024-04-10 ENCOUNTER — TREATMENT (OUTPATIENT)
Dept: PHYSICAL THERAPY | Facility: CLINIC | Age: 84
End: 2024-04-10
Payer: MEDICARE

## 2024-04-10 DIAGNOSIS — M17.12 UNILATERAL PRIMARY OSTEOARTHRITIS, LEFT KNEE: ICD-10-CM

## 2024-04-10 DIAGNOSIS — Z96.652 STATUS POST TOTAL KNEE REPLACEMENT, LEFT: ICD-10-CM

## 2024-04-10 PROCEDURE — 97140 MANUAL THERAPY 1/> REGIONS: CPT | Mod: GP,CQ

## 2024-04-10 PROCEDURE — 97110 THERAPEUTIC EXERCISES: CPT | Mod: GP,CQ

## 2024-04-10 NOTE — PROGRESS NOTES
"Physical Therapy    Physical Therapy Treatment    Patient Name: Flower Lucas  MRN: [id number]  Today's Date: 4/10/2024    Time Calculation  Start Time: 1045  Stop Time: 1130  Time Calculation (min): 45 min    Visit #: 2 out of 12 (EVAL + 11 approved through 4.3.24 to 7.1.24)  Evaluation date: 4/2/24    Current Problem:   1. Unilateral primary osteoarthritis, left knee  Follow Up In Physical Therapy      2. Status post total knee replacement, left  Follow Up In Physical Therapy          SUBJECTIVE:   Pt presents w/ standard cane upon arrival and denies pain. Pt reports she doesn't feel pain while standing, however feels pain when she \"flexes\" the knee.     Precautions: STEADI Fall Risk: 8 (score of 4+ indicates fall risk)  WBAT L LE  Left ISMA        Pain:   Start of session: 0/10       OBJECTIVE:        Treatments:  Therapeutic Exercise: (35 minutes)  Slantboard DF stretch [id number]   Standing marches x5  DL leg press:  - 60# x20  - 80# x20  Bridges x15    Manual Therapy: (10 minutes)  SL pull  Anterior knee jt mob       HEP:      ASSESSMENT:   Pt is 5 weeks postop TKA, demos good strength and mobility with ambulation. Continued to work with patient on strengthening and conditioning around the knee as it translates to improving balance. Pt tolerated interventions well without increasing pain or adverse effects.    Post session pain:      Charted by: Nikhil Hays S-PTA    PLAN:  OP PT PLAN:  Treatment/Interventions: Cryotherapy , Gait training , Manual Therapy  , Neuromuscular re-education , Therapeutic activities , and Therapeutic exercise    PT Plan: Skilled PT   PT Frequency: 2 times per week   Duration: 6 weeks  Certification Period Start Date: pending auth  Certification Period End Date: pending auth  Visits Approved: pending auth  Rehab Potential: Good  Plan of Care Agreement: Patient         Goals:   Pt will report 1/10 pain at worst with prolonged walking and standing  Pt will independent with all ADL's and " household chores to resume living alone without live-in family support  Left knee AROM will be 0-120 or greater to achieve maximal ROM outcomes  Left knee strength will be strong and painfree  TUG without device will be < 13 sec  Sit to stand 30 sec (without UE) >4 reps  WOMAC < 5%

## 2024-04-12 ENCOUNTER — TREATMENT (OUTPATIENT)
Dept: PHYSICAL THERAPY | Facility: CLINIC | Age: 84
End: 2024-04-12
Payer: MEDICARE

## 2024-04-12 DIAGNOSIS — Z96.652 STATUS POST TOTAL KNEE REPLACEMENT, LEFT: ICD-10-CM

## 2024-04-12 DIAGNOSIS — M17.12 UNILATERAL PRIMARY OSTEOARTHRITIS, LEFT KNEE: ICD-10-CM

## 2024-04-12 PROCEDURE — 97140 MANUAL THERAPY 1/> REGIONS: CPT | Mod: GP,CQ

## 2024-04-12 PROCEDURE — 97110 THERAPEUTIC EXERCISES: CPT | Mod: GP,CQ

## 2024-04-12 NOTE — PROGRESS NOTES
Physical Therapy    Physical Therapy Treatment    Patient Name: Flower Lucas  MRN: [id number]  Today's Date: 4/12/2024    Time Calculation  Start Time: 1215  Stop Time: 1300  Time Calculation (min): 45 min    Visit #: 3 out of 12 (EVAL + 11 approved through 4.3.24 to 7.1.24)  Evaluation date: 4/2/24    Current Problem:   1. Unilateral primary osteoarthritis, left knee  Follow Up In Physical Therapy      2. Status post total knee replacement, left  Follow Up In Physical Therapy          SUBJECTIVE:   Pt reports stiffness in the L knee, possibly due to the rainy weather. Notes decreased swelling after previous PT session. Also denies pain upon arrival.     Precautions: STEADI Fall Risk: 8 (score of 4+ indicates fall risk)  WBAT L LE  Left ISMA        Pain:   Start of session: 0/10       OBJECTIVE:        Treatments:  Therapeutic Exercise: (30 minutes)  Recumbent L1 x8 mins  Sit<>Stand 2x10  Side steps w/ bungee hold x5 ea.  Std bridge 2x15  Standing hip abd 2x15 ea.  Mini lunge w/ R TKE 2x15    Manual Therapy: (15 minutes)  SL pull  Anterior knee jt mob       HEP:      ASSESSMENT:   Continued to work w/ patient on balance interventions to improve functional mobility. Also applied manual therapy efforts to reduce stiffness for the L knee, she finds benefits with knee joint mobs. Pt tolerated interventions well without any adverse effects.    Post session pain:   0/10     Charted by: Nikhil Hays S-PTA    PLAN:  OP PT PLAN:  Treatment/Interventions: Cryotherapy , Gait training , Manual Therapy  , Neuromuscular re-education , Therapeutic activities , and Therapeutic exercise    PT Plan: Skilled PT   PT Frequency: 2 times per week   Duration: 6 weeks  Certification Period Start Date: pending auth  Certification Period End Date: pending auth  Visits Approved: pending auth  Rehab Potential: Good  Plan of Care Agreement: Patient         Goals:   Pt will report 1/10 pain at worst with prolonged walking and standing  Pt  will independent with all ADL's and household chores to resume living alone without live-in family support  Left knee AROM will be 0-120 or greater to achieve maximal ROM outcomes  Left knee strength will be strong and painfree  TUG without device will be < 13 sec  Sit to stand 30 sec (without UE) >4 reps  WOMAC < 5%

## 2024-04-16 ENCOUNTER — TREATMENT (OUTPATIENT)
Dept: PHYSICAL THERAPY | Facility: CLINIC | Age: 84
End: 2024-04-16
Payer: MEDICARE

## 2024-04-16 DIAGNOSIS — Z96.652 STATUS POST TOTAL KNEE REPLACEMENT, LEFT: ICD-10-CM

## 2024-04-16 DIAGNOSIS — M17.12 UNILATERAL PRIMARY OSTEOARTHRITIS, LEFT KNEE: ICD-10-CM

## 2024-04-16 PROCEDURE — 97110 THERAPEUTIC EXERCISES: CPT | Mod: GP

## 2024-04-16 NOTE — PROGRESS NOTES
"Physical Therapy    Physical Therapy Treatment    Patient Name: Flower Lucas  MRN: [id number]  Today's Date: 4/16/2024    Time Calculation  Start Time: 1230  Stop Time: 1315  Time Calculation (min): 45 min    Visit #: 4 out of 12 (EVAL + 11 approved through 4.3.24 to 7.1.24)  Evaluation date: 4/2/24    Current Problem:   1. Unilateral primary osteoarthritis, left knee  Follow Up In Physical Therapy      2. Status post total knee replacement, left  Follow Up In Physical Therapy          SUBJECTIVE:   Pain is minimal. Continues with ice 1x/day. Feels she is progressing well. Uses cane occasionally. Still notes some difficulty negotiating stairs. She did some yard work over the weekend and over did it a little. Rested afterwards and felt better     Precautions: STEADI Fall Risk: 8 (score of 4+ indicates fall risk)  WBAT L LE  Left ISMA     Pain:   Start of session: 0/10        OBJECTIVE:    4/16/24  Left knee AROM: 0 - 111    Treatments:  Therapeutic Exercise: (45 minutes)  NuStep  L2 x8 mins  Sit<>Stand 2x12  Forward step over low hurdles (4 hurdles) x 2 laps each R/L  DL leg press:  - 80# 2x20  Forward step ups x 10 each (4\" step and 6\" step)  Lateral step ups x 10 each (4\"step and 6\" step)  Slantboard DF stretch 10 sec x 10  Standing hip abd 2x15 ea  Bridge 2 x 15   Heel slide with strap 10\" x 10    Manual Therapy: ( minutes)       HEP:      ASSESSMENT:   Progressed marching to forward step over hurdles. Pt challenged this activity but able to perform with only one LOB requiring CGA to regain balance. Left knee AROM progressing nicely. No post op concerns at this time. Progressing towards all goals.    Post session pain:   0/10    PLAN:  OP PT PLAN:  Treatment/Interventions: Cryotherapy , Gait training , Manual Therapy  , Neuromuscular re-education , Therapeutic activities , and Therapeutic exercise    PT Plan: Skilled PT   PT Frequency: 2 times per week   Duration: 6 weeks  Certification Period Start Date: pending " auth  Certification Period End Date: pending auth  Visits Approved: pending auth  Rehab Potential: Good  Plan of Care Agreement: Patient         Goals:   Pt will report 1/10 pain at worst with prolonged walking and standing  Pt will independent with all ADL's and household chores to resume living alone without live-in family support  Left knee AROM will be 0-120 or greater to achieve maximal ROM outcomes  Left knee strength will be strong and painfree  TUG without device will be < 13 sec  Sit to stand 30 sec (without UE) >4 reps  WOMAC < 5%

## 2024-04-18 ENCOUNTER — TREATMENT (OUTPATIENT)
Dept: PHYSICAL THERAPY | Facility: CLINIC | Age: 84
End: 2024-04-18
Payer: MEDICARE

## 2024-04-18 DIAGNOSIS — M17.12 UNILATERAL PRIMARY OSTEOARTHRITIS, LEFT KNEE: ICD-10-CM

## 2024-04-18 DIAGNOSIS — Z96.652 STATUS POST TOTAL KNEE REPLACEMENT, LEFT: ICD-10-CM

## 2024-04-18 PROCEDURE — 97110 THERAPEUTIC EXERCISES: CPT | Mod: GP

## 2024-04-18 NOTE — PROGRESS NOTES
"Physical Therapy    Physical Therapy Treatment    Patient Name: Flower Lucas  MRN: [id number]  Today's Date: 4/18/2024    Time Calculation  Start Time: 1315  Stop Time: 1355  Time Calculation (min): 40 min    Visit #: 4 out of 12 (EVAL + 11 approved through 4.3.24 to 7.1.24)  Evaluation date: 4/2/24    Current Problem:   1. Unilateral primary osteoarthritis, left knee  Follow Up In Physical Therapy      2. Status post total knee replacement, left  Follow Up In Physical Therapy          SUBJECTIVE:   Saw ortho today. Pleased with her progress. She was cleared to drive. Follow up again in 7 weeks. Pt reports she is experiencing some soreness in her bilateral hips today, but is otherwise doing well.     Precautions: STEADI Fall Risk: 8 (score of 4+ indicates fall risk)  WBAT L LE  Left ISMA     Pain:   Start of session: 0/10        OBJECTIVE:    4/16/24  Left knee AROM: 0 - 111    Treatments:  Therapeutic Exercise: (45 minutes)  NuStep (Seat 10)  L3 x 8 mins  DL leg press:  - 80# x 20  - 100# x 20  Sit<>Stand 2x12  Forward step over low hurdles (4 hurdles) x 3 laps each R/L  Cone weave x 3 laps  Forward step ups x 15 each (6\" step)  Lateral step ups x 15 each (6\" step)  March on airex 30\" x 2  Slow march on firm x 60\"    Manual Therapy: ( minutes)       HEP:      ASSESSMENT:   Good follow up with ortho today. Tolerated all progressions well. No device or LOB noted with exercises today. Progressing towards all goals.    Post session pain:   0/10    PLAN:  OP PT PLAN:  Treatment/Interventions: Cryotherapy , Gait training , Manual Therapy  , Neuromuscular re-education , Therapeutic activities , and Therapeutic exercise    PT Plan: Skilled PT   PT Frequency: 2 times per week   Duration: 6 weeks  Certification Period Start Date: pending auth  Certification Period End Date: pending auth  Visits Approved: pending auth  Rehab Potential: Good  Plan of Care Agreement: Patient         Goals:   Pt will report 1/10 pain at worst " with prolonged walking and standing  Pt will independent with all ADL's and household chores to resume living alone without live-in family support  Left knee AROM will be 0-120 or greater to achieve maximal ROM outcomes  Left knee strength will be strong and painfree  TUG without device will be < 13 sec  Sit to stand 30 sec (without UE) >4 reps  WOMAC < 5%

## 2024-04-23 ENCOUNTER — APPOINTMENT (OUTPATIENT)
Dept: PHYSICAL THERAPY | Facility: CLINIC | Age: 84
End: 2024-04-23
Payer: MEDICARE

## 2024-04-25 ENCOUNTER — APPOINTMENT (OUTPATIENT)
Dept: PHYSICAL THERAPY | Facility: CLINIC | Age: 84
End: 2024-04-25
Payer: MEDICARE

## 2024-04-30 ENCOUNTER — TREATMENT (OUTPATIENT)
Dept: PHYSICAL THERAPY | Facility: CLINIC | Age: 84
End: 2024-04-30
Payer: MEDICARE

## 2024-04-30 DIAGNOSIS — Z96.652 STATUS POST TOTAL KNEE REPLACEMENT, LEFT: ICD-10-CM

## 2024-04-30 DIAGNOSIS — M17.12 UNILATERAL PRIMARY OSTEOARTHRITIS, LEFT KNEE: ICD-10-CM

## 2024-04-30 PROCEDURE — 97110 THERAPEUTIC EXERCISES: CPT | Mod: GP

## 2024-04-30 NOTE — PROGRESS NOTES
"Physical Therapy    Physical Therapy Treatment    Patient Name: Flower Lucas  MRN: [id number]  Today's Date: 4/30/2024    Time Calculation  Start Time: 1030  Stop Time: 1100  Time Calculation (min): 30 min    Visit #: 5 out of 12 (EVAL + 11 approved through 4.3.24 to 7.1.24)  Evaluation date: 4/2/24    Current Problem:   1. Unilateral primary osteoarthritis, left knee  Follow Up In Physical Therapy      2. Status post total knee replacement, left  Follow Up In Physical Therapy          SUBJECTIVE:   Feels some intermittent soreness and pinching in L hip, possibly from sitting and yard work. Knee continues to do well.     Precautions: STEADI Fall Risk: 8 (score of 4+ indicates fall risk)  WBAT L LE  Left ISMA     Pain:   Start of session: 0/10     OBJECTIVE:    4/16/24  Left knee AROM: 0 - 111    Treatments:  Therapeutic Exercise: (30 minutes)  NuStep (Seat 10)  L3 x 10 mins  Forward step over low hurdles (4 hurdles) x 3 laps each R/L  DL leg press:  - 100# x 20  - 120# x 20  Sit<>Stand 2x12  March on airex 30\" x 2  Slow march on firm x 60\"    NOT TODAY  Cone weave x 3 laps  Forward step ups x 15 each (6\" step)  Lateral step ups x 15 each (6\" step)      Manual Therapy: ( minutes)       HEP:      ASSESSMENT:   Session limited to 30 minutes secondary to pt arriving at wrong time. Overall, pt demonstrating improvements in gait, mobility and balance.    Post session pain:   0/10    PLAN:  OP PT PLAN:  Treatment/Interventions: Cryotherapy , Gait training , Manual Therapy  , Neuromuscular re-education , Therapeutic activities , and Therapeutic exercise    PT Plan: Skilled PT   PT Frequency: 2 times per week   Duration: 6 weeks  Certification Period Start Date: pending auth  Certification Period End Date: pending auth  Visits Approved: pending auth  Rehab Potential: Good  Plan of Care Agreement: Patient         Goals:   Pt will report 1/10 pain at worst with prolonged walking and standing  Pt will independent with all ADL's " and household chores to resume living alone without live-in family support  Left knee AROM will be 0-120 or greater to achieve maximal ROM outcomes  Left knee strength will be strong and painfree  TUG without device will be < 13 sec  Sit to stand 30 sec (without UE) >4 reps  WOMAC < 5%

## 2024-05-02 ENCOUNTER — TREATMENT (OUTPATIENT)
Dept: PHYSICAL THERAPY | Facility: CLINIC | Age: 84
End: 2024-05-02
Payer: MEDICARE

## 2024-05-02 DIAGNOSIS — Z96.652 STATUS POST TOTAL KNEE REPLACEMENT, LEFT: ICD-10-CM

## 2024-05-02 DIAGNOSIS — M17.12 UNILATERAL PRIMARY OSTEOARTHRITIS, LEFT KNEE: ICD-10-CM

## 2024-05-02 PROCEDURE — 97110 THERAPEUTIC EXERCISES: CPT | Mod: GP

## 2024-05-02 NOTE — PROGRESS NOTES
"Physical Therapy    Physical Therapy Treatment    Patient Name: Flower Lucas  MRN: [id number]  Today's Date: 5/2/2024    Time Calculation  Start Time: 0930  Stop Time: 1015  Time Calculation (min): 45 min    Visit #: 7 out of 12 (EVAL + 11 approved through 4.3.24 to 7.1.24)  Evaluation date: 4/2/24    Current Problem:   1. Unilateral primary osteoarthritis, left knee  Follow Up In Physical Therapy      2. Status post total knee replacement, left  Follow Up In Physical Therapy          SUBJECTIVE:   Doing well with minimal complaints this day     Precautions: STEADI Fall Risk: 8 (score of 4+ indicates fall risk)  WBAT L LE  Left ISMA     Pain:   Start of session: 0/10     OBJECTIVE:    4/16/24  Left knee AROM: 0 - 111    Treatments:  Therapeutic Exercise: (45 minutes)  NuStep (Seat 10)  L3 x 10 mins  Forward step over low hurdles (4 hurdles) x 3 laps each R/L  Lateral step over low hurdles (4 hurdles) x 2 laps  Cone weave x 3 laps  DL leg press:  - 120# 2 x 20  Forward step ups x 15 each (6\" step)  Lateral step ups x 15 each (6\" step)  Sit<>Stand 2x12  March on airex 30\" x 2 (not today)  Slow march on firm x 60\"      Manual Therapy: ( minutes)       HEP:      ASSESSMENT:   Demonstrating steady progress towards all goals with notable improvements in gait and balance.    Post session pain:   0/10    PLAN:  OP PT PLAN:  Treatment/Interventions: Cryotherapy , Gait training , Manual Therapy  , Neuromuscular re-education , Therapeutic activities , and Therapeutic exercise    PT Plan: Skilled PT   PT Frequency: 2 times per week   Duration: 6 weeks  Certification Period Start Date: pending auth  Certification Period End Date: pending auth  Visits Approved: pending auth  Rehab Potential: Good  Plan of Care Agreement: Patient         Goals:   Pt will report 1/10 pain at worst with prolonged walking and standing  Pt will independent with all ADL's and household chores to resume living alone without live-in family support  Left " knee AROM will be 0-120 or greater to achieve maximal ROM outcomes  Left knee strength will be strong and painfree  TUG without device will be < 13 sec  Sit to stand 30 sec (without UE) >4 reps  WOMAC < 5%

## 2024-05-06 NOTE — OP NOTE
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    OPERATION/PROCEDURE:  Left direct anterior approach total hip arthroplasty.    SURGEON:  Augustine Ramirez DO.    ASSISTANT(S):  First assistant:  Hollis Vargas, physician assistant.    ANESTHESIA:    IMPLANTS UTILIZED:  1. [place] Accolade C/cemented 132 femoral stem-size 5.  2. Chitra Trident II PSL acetabular cup - size 52, code E.  3. Chitra X3 0-degree nonlipped polyethylene liner - size 36, code E.  4. Biolox Delta ceramic femoral head - size 36+ 0 mm length.    INTRAOPERATIVE PATHOLOGY AND FINDINGS/OPERATIVE INDICATIONS:  The patient is a pleasant 82-year-old female, longstanding history of  worsening left hip pain progressing to the point that it greatly  interfered with her quality of life.  She admits to difficulty  ambulating.  She required the use of assistive devices such as a cane  or a walker, difficulty putting on her shoes and socks, difficulty  sleeping at night, difficulty navigating uneven ground, difficulty  going from sit to stand.  Physical exam revealed a significantly  antalgic gait.  Trendelenburg stance negative; however, very painful  to bear weight on that limb.  Range of motion significantly  ankylosed.  Preoperative radiographs revealed severe bone-on-bone  osteoarthritic change of the left hip, complete joint space loss  circumferentially about the hip, bone-on-bone apposition between the  femoral head and the acetabulum, subchondral cystic changes,  subchondral sclerosis, and marginal osteophytic formation around the  acetabular rim as well as the femoral head and neck junction.  With  continued pain and disability related to her left hip that greatly  interfered with her quality of life and after having failed multiple  attempts at conservative management over an extended period of time,  ultimately the patient did  choose to move forward with total hip  arthroplasty.  At the time of the procedure, exam under anesthesia  revealed subtle flexion contracture, bilateral knees as well as  healed scar to the right knee from remote total knee arthroplasty.  Overall, limb lengths were slightly asymmetric, left leg slightly  shorter the right.  Range of motion of the left hip ankylosed with an  obligate external rotation.  Intraoperatively, the patient was found  to have a hypertrophic hip capsule consistent with ankylosed nature  of her joint.  Hypertrophic arthritic related synovitis noted  throughout the joint, arthritic related effusion appreciated.  Bone  quality fairly osteopenic/osteoporotic consistent with the patient's  age.  Macerated degenerative hypertrophic calcified labral tissue was  noted circumferentially.  Complete articular cartilage loss  identified about the weightbearing aspect of femoral head in the  acetabulum.  There was no indication of infection at the time of the  procedure.     PROCEDURE IN DETAIL:  The patient was correctly identified and marked in preoperative  holding.  Risks, benefits, alternatives, reasonable expectations for  outcomes had previously been discussed.  The patient was escorted to  operative suite #4 at [facility].  Once in the operative  suite, surgical pause/time-out was performed.  Preoperative  antibiotics were administered.  Spinal anesthetic was administered.  Intravenous tranexamic acid was administered by the Department of  Anesthesia.  The patient was positioned supine on a standard  operative table.  All bony prominences well padded.  Hips positioned  over the break of the bed.  Bilateral hips and lower extremities then  sterilely prepped and draped in a standard fashion from umbilicus to  ankles.  Surgical window was cut through the drapes about the level  of the left hip.  This area was then covered with an Ioban.  Anatomic  landmarks were identified and marked with  sterile marking pen.  A  10-blade was then used to make a 10 cm incision beginning 3 cm  lateral to the anterior superior iliac spine, carried distally and  obliquely aiming towards the lateral femoral epicondyle of the knee.  Careful dissection through subcutaneous tissues utilizing Bovie  cautery to achieve hemostasis was performed until the investing  fascia of tensor fascia rahel was identified.  This was then incised  in line with the muscle belly.  In the skin incision, muscle belly  was then reflected laterally.  A superior extracapsular retractor was  then inserted.  Circumflex vessels were identified, coagulated with  Aquamantys, and sharply incised with Bovie cautery.  An inferior  extracapsular retractor was then inserted.  Psoas tendon was adhesed  onto the anterior hip capsule.  This was freed with Bovie cautery and  then appropriately retracted with retractor.  At this time, reflected  head of rectus femoris was sacrificed.  Adhesions of the gluteus  minimus to the superior hip capsule were lysed with Bovie cautery.  Superior retractor then repositioned.  Bump was placed underneath the  left knee.  Guan elevator was used to elevate the rectus femoris and  psoas off the anterior hip capsule.  Anterior acetabular retractor  was then inserted.  Pericapsular fat sharply excised with Bovie  cautery.  Aquamantys was used to achieve hemostasis, especially along  the intertrochanteric line.  Bovie cautery was then used to perform a  standard Z arthrotomy of the anterior hip capsule, tagging the  superior and inferior leaflets for later closure.  Extracapsular  retractors were then moved intracapsularly.  Anatomic landmarks were  identified and marked with sterile marking pen.  A freehand wafer cut  of bone was then performed with the saw.  Wafer bone removed.  Native  femoral head was then removed with corkscrew power.  Acetabular  retractors were then inserted.  Labrum sharply excised with Bovie  cautery  circumferentially.  Pulvinar coagulated with Aquamantys and  sharply excised with Bovie cautery.  Posterior capsular synovitis and  hypertrophied capsular tissue sharply excised with Bovie cautery.  Native femoral head measured 50 mm in size.  As such, began reaming  with a size 50 reamer going up to a size 52 reamer utilizing  fluoroscopic imaging to ensure accuracy of depth and positioning of  ream.  The wound then copiously irrigated with sterile saline.  A  size 52 cup was then malleted into position, again utilizing  fluoroscopic imaging to ensure accuracy of fit-fill position.  The  cup was found to have excellent fixation.  At this time, polyethylene  liner was malleted into the locking mechanism and found to have  excellent fixation.  We then turned our attention to the femur.  Femur was positioned for preparation.  Superior and inferior capsular  releases were completed.  Adequate femoral elevation was achieved box  chisel was passed followed by curved curette and a suction tip to  function as a canal finder followed by lateralizing rasp followed by  sequential broaching beginning with a size 2 broach going up to a  size 5 broach.  Calcar planer was utilized.  Trial 132 degree neck  was attached with a trial 36+ 0 mm head.  Open reduction of the hip  was then performed.  Leg lengths were assessed.  Leg lengths were  found to be equal at the level of the medial malleoli, the level of  the patella with equal femoral lengths on intraoperative Galeazzi  exam.  Stability of the hip was assessed with hyperextension,  hyperextension with external rotation, direct external rotation,  hyperflexion, hyperflexion with adduction, hyperflexion with  adduction and internal rotation, the position of sleep as well as  figure-of-four position.  The hip was found to be inherently quite  stable.  Fluoroscopic imaging was then utilized on both AP and  frog-leg lateral views and demonstrating appropriate fit-fill  and  positioning of the trial femoral component.  At this time, a bone  hook manual traction external rotation was used to dislocate the  trial components.  Repeat femoral exposure was achieved.  Trial  femoral components were then removed.  The cement restrictor was  placed at roughly 16 cm distal to the medial calcar cut.  Canal was  then brushed.  Pulse lavage and a suction tampon inserted.  Final  implants were opened.  Cement was mixed.  At this time, the cement  was pressurized in the canal.  This stem was then inserted into the  cement.  Excess cement removed with Delmar elevators.  Stem then held  into position as the cement was allowed to cure.  Once the cement had  completely cured and was hard, the Lindquist taper was cleaned, and the  head was then impacted onto the Lindquist taper and found to have  excellent fixation.  At this time, final open reduction of the hip  was performed.  Leg lengths and stability again assessed, found to be  equal to that of the trial components.  Repeat fluoroscopic imaging  was then obtained on both AP and frog-leg lateral views demonstrating  well-positioned hybrid total hip arthroplasty, cemented femoral stem,  press-fit acetabular component with the components well fixed, well  positioned without any obvious associated complications.  At this  time, the wound was copiously irrigated with a premade dilute  Betadine irrigation solution, allowed to soak x3 minutes followed by  copious irrigation with sterile saline via Simpulse lavage.  Shayna  powder was placed deep into the anterior hip capsule.  Tag sutures  were then removed in anterior hip capsule and reapproximated with a  #1 Vicryl ligature in interrupted simple stitch fashion.  Pericapsular pain injection was then provided.  Remainder of the  Shayna powder was placed into the deep fascial planes.  Investing  fascia of tensor fascia rahel was then reapproximated with a #1 Vicryl  ligature in running locked segmental fashion.   Deep subcutaneous  tissues were closed with 2-0 Vicryl ligature in buried interrupted  fashion.  Skin reapproximated with 3-0 V-Loc in a running  subcuticular fashion followed by application of a surgical glue.  Once the surgical glue was completely dried, self-adherent Mepilex  silver dressing was applied over top of the wound followed by  application of bilateral thigh-high MARIA TERESA hose.  The patient was then  awoken, transferred to the hospital bed, returned to PACU in stable  condition.     COUNTS:  Correct.  Case was clean and elective.    COMPLICATIONS:  None.    SPECIMENS:  None.    ESTIMATED BLOOD LOSS:  350 cc.      Augustine Lugo DO    DD:  07/19/2023 10:42:12 EST  DT:  07/20/2023 03:07:31 EST  DICTATION NUMBER:  [id number]  INTERNAL JOB NUMBER:  [id number]    CC:  Augustine Lugo DO, Fax: [phone]        Electronic Signatures:  AUGUSTINE LUGO () (Signed on 20-Jul-2023 07:49)   Authored  Unsigned, Draft (SYS GENERATED) (Entered on 20-Jul-2023 03:07)   Entered    Last Updated: 20-Jul-2023 07:49 by AUGUSTINE LUGO ()

## 2024-05-07 ENCOUNTER — TREATMENT (OUTPATIENT)
Dept: PHYSICAL THERAPY | Facility: CLINIC | Age: 84
End: 2024-05-07
Payer: MEDICARE

## 2024-05-07 DIAGNOSIS — Z96.652 STATUS POST TOTAL KNEE REPLACEMENT, LEFT: ICD-10-CM

## 2024-05-07 DIAGNOSIS — M17.12 UNILATERAL PRIMARY OSTEOARTHRITIS, LEFT KNEE: ICD-10-CM

## 2024-05-07 PROCEDURE — 97110 THERAPEUTIC EXERCISES: CPT | Mod: GP | Performed by: PHYSICAL THERAPIST

## 2024-05-07 NOTE — PROGRESS NOTES
"Physical Therapy    Physical Therapy Treatment    Patient Name: Flower Lucas  MRN: [id number]  Today's Date: 5/7/2024    Time Calculation  Start Time: 0951  Stop Time: 1029  Time Calculation (min): 38 min  DOS 3/6/24 about 8+weeks left   Visit #: 7 out of 12 (EVAL + 11 approved through 4.3.24 to 7.1.24)  Evaluation date: 4/2/24    Current Problem:   1. Unilateral primary osteoarthritis, left knee  Follow Up In Physical Therapy      2. Status post total knee replacement, left  Follow Up In Physical Therapy         SUBJECTIVE:   Doing well no knee pain, left hip has been pinching at times      Precautions: STEADI Fall Risk: 8 (score of 4+ indicates fall risk)  WBAT L LE  Left ISMA     Pain:   Start of session: 0/10     OBJECTIVE:    SLB challenged with hip abd, able to 6 inch step up with good control   4/16/24  Left knee AROM: 0 - 111    Treatments:  Therapeutic Exercise: ( 39 minutes)  NuStep (Seat 10)  L3 x 10 mins legs only   Forward step over low hurdles (4 hurdles) x 3 laps each R/L  Lateral step over low hurdles (4 hurdles) x 3 laps  DL leg press: not deep and cues for no valgus   - 100# 2 x 20 slower eccentric   Seated lean back LAQ 2x10/1#  Forward step ups x 15 each (6\" step)no UE support  Lateral step ups x 15 each (6\" step)DNP  Sit<>Stand 2x13 no hands raised bed   March on airex 30\" x 2 (not today)  Slow march on firm x 60\"DNP  Stand Hip abduction leg raises 2x10 ea leg       Manual Therapy: ( minutes)       HEP:      ASSESSMENT:   Progressing  with goals and strength, no greater sx with today's rx in hip or knee, more fatigue post     Post session pain:   0/10    PLAN:  OP PT PLAN:  Treatment/Interventions: Cryotherapy , Gait training , Manual Therapy  , Neuromuscular re-education , Therapeutic activities , and Therapeutic exercise    PT Plan: Skilled PT   PT Frequency: 2 times per week   Duration: 6 weeks  Certification Period Start Date: pending auth  Certification Period End Date: pending auth  Visits " Approved: pending auth  Rehab Potential: Good  Plan of Care Agreement: Patient         Goals:   Pt will report 1/10 pain at worst with prolonged walking and standing  Pt will independent with all ADL's and household chores to resume living alone without live-in family support  Left knee AROM will be 0-120 or greater to achieve maximal ROM outcomes  Left knee strength will be strong and painfree  TUG without device will be < 13 sec  Sit to stand 30 sec (without UE) >4 reps  WOMAC < 5%

## 2024-05-08 ENCOUNTER — APPOINTMENT (OUTPATIENT)
Dept: PRIMARY CARE | Facility: CLINIC | Age: 84
End: 2024-05-08
Payer: MEDICARE

## 2024-05-09 ENCOUNTER — APPOINTMENT (OUTPATIENT)
Dept: PHYSICAL THERAPY | Facility: CLINIC | Age: 84
End: 2024-05-09
Payer: MEDICARE

## 2024-05-14 ENCOUNTER — APPOINTMENT (OUTPATIENT)
Dept: PHYSICAL THERAPY | Facility: CLINIC | Age: 84
End: 2024-05-14
Payer: MEDICARE

## 2024-05-28 ENCOUNTER — TREATMENT (OUTPATIENT)
Dept: PHYSICAL THERAPY | Facility: CLINIC | Age: 84
End: 2024-05-28
Payer: MEDICARE

## 2024-05-28 DIAGNOSIS — M17.12 UNILATERAL PRIMARY OSTEOARTHRITIS, LEFT KNEE: ICD-10-CM

## 2024-05-28 DIAGNOSIS — Z96.652 STATUS POST TOTAL KNEE REPLACEMENT, LEFT: ICD-10-CM

## 2024-05-28 PROCEDURE — 97110 THERAPEUTIC EXERCISES: CPT | Mod: GP

## 2024-05-28 NOTE — PROGRESS NOTES
"Physical Therapy    Physical Therapy Treatment    Patient Name: Flower Lucas  MRN: [id number]  Today's Date: 5/28/2024    Time Calculation  Start Time: 0930  Stop Time: 1015  Time Calculation (min): 45 min  DOS 3/6/24 about 8+weeks left   Visit #: 8 out of 12 (EVAL + 11 approved through 4.3.24 to 7.1.24)  Evaluation date: 4/2/24    Current Problem:   1. Unilateral primary osteoarthritis, left knee  Follow Up In Physical Therapy      2. Status post total knee replacement, left  Follow Up In Physical Therapy         SUBJECTIVE:   She traveled out of town to [place] recently which required a lot of sitting. She feels sore in her legs. Her back is also sore after putting flowers out at [address] recently.      Precautions: STEADI Fall Risk: 8 (score of 4+ indicates fall risk)  WBAT L LE  Left ISMA     Pain:   Start of session: 0/10     OBJECTIVE:    SLB challenged with hip abd, able to 6 inch step up with good control   4/16/24  Left knee AROM: 0 - 111    Treatments:  Therapeutic Exercise: ( 39 minutes)  Seated lumbar rollout x 10; with rotation x 10 R/L   Seated lumbar extension x 10  Seated trunk rotation x 10  Sit to stand from elevated mat 2 x 10  LAQ, 1# 3 x 10  Seated wand flexion (to stretch UE and trunk) x 10  Standing high lunge step back x 20 alt  Standing calf stretch on slantboard 10\" x 10  Lumbar flexion walkout at counter 10\" x 10  Seated hamstring stretch with foot on stool 30\" x 1    Not today:  NuStep (Seat 10)  L3 x 10 mins legs only   Forward step over low hurdles (4 hurdles) x 3 laps each R/L  Lateral step over low hurdles (4 hurdles) x 3 laps  DL leg press: not deep and cues for no valgus   - 100# 2 x 20 slower eccentric   Seated lean back LAQ 2x10/1#  Forward step ups x 15 each (6\" step)no UE support  Lateral step ups x 15 each (6\" step)DNP  Sit<>Stand 2x13 no hands raised bed   March on airex 30\" x 2 (not today)  Slow march on firm x 60\"DNP  Stand Hip abduction leg raises 2x10 ea leg       Manual " Therapy: ( minutes)       HEP:  Access Code: [email or web address]  URL: [email or web address]  Date: 05/28/2024  Prepared by: Sanna Mane    Exercises  - Seated Bilateral Shoulder Flexion Towel Slide at Table Top  - 2-3 x daily - 7 x weekly - 1-2 sets - 10 reps - 5 hold  - Seated Lumbar Extension  - 2-3 x daily - 7 x weekly - 1-2 sets - 10 reps  - Seated Trunk Rotation - Arms Crossed  - 2-3 x daily - 7 x weekly - 1-2 sets - 10 reps  - Sit to Stand with Counter Support  - 2-3 x daily - 7 x weekly - 1-2 sets - 10 reps  - Seated Long Arc Quad  - 2-3 x daily - 7 x weekly - 1-2 sets - 10 reps    ASSESSMENT:   Increased total body soreness today from recent traveling and repetitive bending [place]. Modified current program to focus on improving mobility and reducing DOMS. Issued these exercises for patient to work on at home, as well.     Post session pain:   0/10    PLAN:  Next visit: Plan to resume prior strengthening and balance exercises  Pt has four visits left in authorization period; she would like to schedule 1x/week for 4 weeks  This patient's care will be transferred to [facility] since I will be transferring locations; pt is aware of this    OP PT PLAN:  Treatment/Interventions: Cryotherapy , Gait training , Manual Therapy  , Neuromuscular re-education , Therapeutic activities , and Therapeutic exercise    PT Plan: Skilled PT   PT Frequency: 2 times per week   Duration: 6 weeks  Certification Period Start Date: 4/3/24  Certification Period End Date: 7/1/24  Visits Approved: 11  Rehab Potential: Good  Plan of Care Agreement: Patient         Goals:   Pt will report 1/10 pain at worst with prolonged walking and standing; PROGRESSING  Pt will independent with all ADL's and household chores to resume living alone without live-in family support; ACHIEVED  Left knee AROM will be 0-120 or greater to achieve maximal ROM outcomes  Left knee strength will be strong and painfree;  PROGRESSING  TUG without device will be < 13 sec  Sit to stand 30 sec (without UE) >4 reps  WOMAC < 5%

## 2024-06-03 ENCOUNTER — OFFICE VISIT (OUTPATIENT)
Dept: PRIMARY CARE | Facility: CLINIC | Age: 84
End: 2024-06-03
Payer: MEDICARE

## 2024-06-03 VITALS
HEIGHT: 65 IN | BODY MASS INDEX: 27.32 KG/M2 | HEART RATE: 57 BPM | OXYGEN SATURATION: 98 % | DIASTOLIC BLOOD PRESSURE: 60 MMHG | SYSTOLIC BLOOD PRESSURE: 112 MMHG | WEIGHT: 164 LBS

## 2024-06-03 DIAGNOSIS — E78.5 HYPERLIPIDEMIA, UNSPECIFIED HYPERLIPIDEMIA TYPE: Primary | ICD-10-CM

## 2024-06-03 DIAGNOSIS — M81.0 OSTEOPOROSIS, UNSPECIFIED OSTEOPOROSIS TYPE, UNSPECIFIED PATHOLOGICAL FRACTURE PRESENCE: ICD-10-CM

## 2024-06-03 DIAGNOSIS — K63.5 POLYP OF COLON, UNSPECIFIED PART OF COLON, UNSPECIFIED TYPE: ICD-10-CM

## 2024-06-03 DIAGNOSIS — E03.9 HYPOTHYROIDISM, UNSPECIFIED TYPE: ICD-10-CM

## 2024-06-03 DIAGNOSIS — G40.909 NONINTRACTABLE EPILEPSY WITHOUT STATUS EPILEPTICUS, UNSPECIFIED EPILEPSY TYPE (MULTI): ICD-10-CM

## 2024-06-03 DIAGNOSIS — G47.33 OBSTRUCTIVE SLEEP APNEA OF ADULT: ICD-10-CM

## 2024-06-03 DIAGNOSIS — Z12.31 ENCOUNTER FOR SCREENING MAMMOGRAM FOR MALIGNANT NEOPLASM OF BREAST: ICD-10-CM

## 2024-06-03 DIAGNOSIS — R73.9 HYPERGLYCEMIA: ICD-10-CM

## 2024-06-03 DIAGNOSIS — I10 HYPERTENSION, UNSPECIFIED TYPE: ICD-10-CM

## 2024-06-03 PROCEDURE — 1159F MED LIST DOCD IN RCRD: CPT | Performed by: INTERNAL MEDICINE

## 2024-06-03 PROCEDURE — 3078F DIAST BP <80 MM HG: CPT | Performed by: INTERNAL MEDICINE

## 2024-06-03 PROCEDURE — 3074F SYST BP LT 130 MM HG: CPT | Performed by: INTERNAL MEDICINE

## 2024-06-03 PROCEDURE — 99214 OFFICE O/P EST MOD 30 MIN: CPT | Performed by: INTERNAL MEDICINE

## 2024-06-03 PROCEDURE — 1160F RVW MEDS BY RX/DR IN RCRD: CPT | Performed by: INTERNAL MEDICINE

## 2024-06-03 PROCEDURE — 1126F AMNT PAIN NOTED NONE PRSNT: CPT | Performed by: INTERNAL MEDICINE

## 2024-06-03 RX ORDER — LEVOTHYROXINE SODIUM 112 UG/1
112 TABLET ORAL DAILY
Qty: 90 TABLET | Refills: 1 | Status: SHIPPED | OUTPATIENT
Start: 2024-06-03

## 2024-06-03 ASSESSMENT — PAIN SCALES - GENERAL: PAINLEVEL: 0-NO PAIN

## 2024-06-03 ASSESSMENT — PATIENT HEALTH QUESTIONNAIRE - PHQ9
SUM OF ALL RESPONSES TO PHQ9 QUESTIONS 1 AND 2: 0
1. LITTLE INTEREST OR PLEASURE IN DOING THINGS: NOT AT ALL
2. FEELING DOWN, DEPRESSED OR HOPELESS: NOT AT ALL

## 2024-06-03 ASSESSMENT — COLUMBIA-SUICIDE SEVERITY RATING SCALE - C-SSRS
2. HAVE YOU ACTUALLY HAD ANY THOUGHTS OF KILLING YOURSELF?: NO
1. IN THE PAST MONTH, HAVE YOU WISHED YOU WERE DEAD OR WISHED YOU COULD GO TO SLEEP AND NOT WAKE UP?: NO
6. HAVE YOU EVER DONE ANYTHING, STARTED TO DO ANYTHING, OR PREPARED TO DO ANYTHING TO END YOUR LIFE?: NO

## 2024-06-03 NOTE — PROGRESS NOTES
"Subjective   Patient ID: Flower Lucas is a 83 y.o. female who presents for Follow-up.    HPI patient presents to clinic for follow-up visit on history of epilepsy, gouty arthritis, hyperlipidemia, hypertension, hypothyroidism, osteoporosis, status post left hip arthroplasty secondary to osteoarthritis on 7/19/2023, prediabetes osteoarthritis, colonic polyp, EF of 60%, chronic constipation and obstructive sleep apnea.  She underwent left total knee arthroplasty on 3/6/2024 secondary to osteoarthritis at [facility].  She is doing well and denies any swelling of the limb fever chills chest pain and shortness of breath.  Laboratory studies done few months ago revealed WBC of 13.0 hemoglobin of 12.4 MCV of 102, glucose of 117 calcium of 7.9 and other studies have been reviewed and discussed with her.    Review of Systems   Constitutional: Negative.    HENT: Negative.     Eyes: Negative.    Respiratory: Negative.     Cardiovascular: Negative.    Gastrointestinal: Negative.    Endocrine: Negative.    Genitourinary: Negative.    Musculoskeletal: Negative.    Skin: Negative.    Allergic/Immunologic: Negative.    Neurological: Negative.    Hematological: Negative.    Psychiatric/Behavioral: Negative.         Objective   /60   Pulse 57   Ht 1.651 m (5' 5\")   Wt 74.4 kg (164 lb)   LMP  (LMP Unknown)   SpO2 98%   BMI 27.29 kg/m²     Physical Exam  Constitutional:       Appearance: Normal appearance. She is normal weight.   HENT:      Right Ear: Tympanic membrane normal.      Left Ear: Tympanic membrane and ear canal normal.      Nose: Nose normal.   Neck:      Vascular: No carotid bruit.   Cardiovascular:      Rate and Rhythm: Normal rate.   Pulmonary:      Effort: No respiratory distress.      Breath sounds: No stridor. No wheezing.   Abdominal:      Palpations: Abdomen is soft.      Tenderness: There is no abdominal tenderness. There is no guarding or rebound.   Skin:     Coloration: Skin is not " jaundiced.      Findings: No bruising.   Neurological:      General: No focal deficit present.      Mental Status: She is alert and oriented to person, place, and time.   Psychiatric:         Mood and Affect: Mood normal.         Assessment/Plan    patient will be scheduled for mammogram regarding screening for breast cancer.  She will continue levothyroxine regarding history of hypothyroidism.  She will also continue lamotrigine and zonisamide regarding history of seizure disorder.  She will continue other home medications and will return to clinic in 3 months for follow-up visit.

## 2024-06-04 ENCOUNTER — TREATMENT (OUTPATIENT)
Dept: PHYSICAL THERAPY | Facility: CLINIC | Age: 84
End: 2024-06-04
Payer: MEDICARE

## 2024-06-04 DIAGNOSIS — Z96.652 STATUS POST TOTAL KNEE REPLACEMENT, LEFT: ICD-10-CM

## 2024-06-04 DIAGNOSIS — M17.12 UNILATERAL PRIMARY OSTEOARTHRITIS, LEFT KNEE: ICD-10-CM

## 2024-06-04 PROCEDURE — 97110 THERAPEUTIC EXERCISES: CPT | Mod: GP,CQ

## 2024-06-04 NOTE — PROGRESS NOTES
"Physical Therapy    Physical Therapy Treatment    Patient Name: Flower Lucas  MRN: [id number]  Today's Date: 6/4/2024    Time Calculation  Start Time: 0845  Stop Time: 0925  Time Calculation (min): 40 min  DOS 3/6/24 about 8+weeks left   Visit #: 9 out of 12 (EVAL + 11 approved through 4.3.24 to 7.1.24)  Evaluation date: 4/2/24    Current Problem:   1. Unilateral primary osteoarthritis, left knee  Follow Up In Physical Therapy      2. Status post total knee replacement, left  Follow Up In Physical Therapy         SUBJECTIVE:   Left knee feels better this week from recent flare up from last week (see last note subjective), Ambulated to dept without straight cane this morning, low back is still sore from bending over planting flowers a few weeks ago , Has an appt with her doctor to address lumbar sx's     Precautions: STEADI Fall Risk: 8 (score of 4+ indicates fall risk)  WBAT L LE  Left ISMA     Pain:   Start of session: 0/10     OBJECTIVE:    30 sec timed sit to stand test, able to complete 7 reps without UE assist (goal achieved)    4/16/24  Left knee AROM: 0 - 111    Treatments:  Therapeutic Exercise: ( 40 minutes)  Nustep L3 x 10 min (seat at 9)  Seated lumbar rollout w/ orange ball  x 10  Seated lumbar extension w/wand  x 10  Forward step ups 6 inch lead left 1 x 15  Sit to stand from chair without UE x 7  Leg press # 2 x 20 slow eccentric return  Standing calf stretch on slantboard 20\" x 3  Seated hamstring stretch with foot on stool 30\" x 2      Manual Therapy: ( minutes)       HEP:  Access Code: [email or web address]  URL: [email or web address]  Date: 05/28/2024  Prepared by: Sanna Mane    Exercises  - Seated Bilateral Shoulder Flexion Towel Slide at Table Top  - 2-3 x daily - 7 x weekly - 1-2 sets - 10 reps - 5 hold  - Seated Lumbar Extension  - 2-3 x daily - 7 x weekly - 1-2 sets - 10 reps  - Seated Trunk Rotation - Arms Crossed  - 2-3 x daily - 7 x weekly - 1-2 sets - 10 reps  - Sit to Stand with Counter " Support  - 2-3 x daily - 7 x weekly - 1-2 sets - 10 reps  - Seated Long Arc Quad  - 2-3 x daily - 7 x weekly - 1-2 sets - 10 reps    ASSESSMENT:   Able to resume LE strengthening exercises this date without increasing lumbar sx's. Sit to stand goal achieved today. Continue 1 time a week sessions progressing toward goals  Post session pain:   0/10    PLAN:  Next visit: Plan to resume prior strengthening and balance exercises  Pt has four visits left in authorization period; she would like to schedule 1x/week for 4 weeks  This patient's care will be transferred to [facility] since I will be transferring locations; pt is aware of this    OP PT PLAN:  Treatment/Interventions: Cryotherapy , Gait training , Manual Therapy  , Neuromuscular re-education , Therapeutic activities , and Therapeutic exercise    PT Plan: Skilled PT   PT Frequency: 2 times per week   Duration: 6 weeks  Certification Period Start Date: 4/3/24  Certification Period End Date: 7/1/24  Visits Approved: 11  Rehab Potential: Good  Plan of Care Agreement: Patient         Goals:   Pt will report 1/10 pain at worst with prolonged walking and standing; PROGRESSING  Pt will independent with all ADL's and household chores to resume living alone without live-in family support; ACHIEVED  Left knee AROM will be 0-120 or greater to achieve maximal ROM outcomes  Left knee strength will be strong and painfree; PROGRESSING  TUG without device will be < 13 sec  Sit to stand 30 sec (without UE) >4 reps -ACHIEVED  WOMAC < 5%

## 2024-06-07 ASSESSMENT — ENCOUNTER SYMPTOMS
ALLERGIC/IMMUNOLOGIC NEGATIVE: 1
HEMATOLOGIC/LYMPHATIC NEGATIVE: 1
EYES NEGATIVE: 1
ENDOCRINE NEGATIVE: 1
CARDIOVASCULAR NEGATIVE: 1
RESPIRATORY NEGATIVE: 1
GASTROINTESTINAL NEGATIVE: 1
CONSTITUTIONAL NEGATIVE: 1
NEUROLOGICAL NEGATIVE: 1
MUSCULOSKELETAL NEGATIVE: 1
PSYCHIATRIC NEGATIVE: 1

## 2024-06-11 ENCOUNTER — TREATMENT (OUTPATIENT)
Dept: PHYSICAL THERAPY | Facility: CLINIC | Age: 84
End: 2024-06-11
Payer: MEDICARE

## 2024-06-11 DIAGNOSIS — Z96.652 STATUS POST TOTAL KNEE REPLACEMENT, LEFT: ICD-10-CM

## 2024-06-11 DIAGNOSIS — M17.12 UNILATERAL PRIMARY OSTEOARTHRITIS, LEFT KNEE: ICD-10-CM

## 2024-06-11 NOTE — PROGRESS NOTES
"Physical Therapy    Physical Therapy Treatment    Patient Name: Flower Lucas  MRN: [id number]  Today's Date: 6/25/2024    Time Calculation  Start Time: 0845  Stop Time: 0925  Time Calculation (min): 40 min  DOS 3/6/24 about 8+weeks left   Visit #: 10 out of 12 (EVAL + 11 approved through 4.3.24 to 7.1.24)  Evaluation date: 4/2/24    Current Problem:   1. Unilateral primary osteoarthritis, left knee  Follow Up In Physical Therapy      2. Status post total knee replacement, left  Follow Up In Physical Therapy         SUBJECTIVE:   Pt had follow up appt with doctor last week  x ray  taken of left knee, healing appropriately , next follow up is in 4 months. Low back also feels good.      Precautions: STEADI Fall Risk: 8 (score of 4+ indicates fall risk)  WBAT L LE  Left ISMA     Pain:   Start of session: 1-2/10     OBJECTIVE:    6-11-24  Left knee flexion AROM 114 degrees in supine after exercises  6-4-24  30 sec timed sit to stand test, able to complete 7 reps without UE assist (goal achieved)  4/16/24  Left knee AROM: 0 - 111    Treatments:  Therapeutic Exercise: ( 40 minutes)  Nustep L3 x 10 min (seat at 9)  Forward step ups 6 inch lead left 2 x 15  Seated LAQ 2# 3 x 10 Left  Standing   Low estrella forward step overs (3 hurdles) x 4 laps   Air ex lateral stepping 4 laps  Wide POP on blue foam pad: slow march x 10 , dignol reach with ball x 10 L/R  Leg press #  2 x 20 slow eccentric return  Standing calf stretch on slantboard 20\" x 3      Manual Therapy: ( minutes)       HEP:  Access Code: [email or web address]  URL: [email or web address]  Date: 05/28/2024  Prepared by: Sanna Mane    Exercises  - Seated Bilateral Shoulder Flexion Towel Slide at Table Top  - 2-3 x daily - 7 x weekly - 1-2 sets - 10 reps - 5 hold  - Seated Lumbar Extension  - 2-3 x daily - 7 x weekly - 1-2 sets - 10 reps  - Seated Trunk Rotation - Arms Crossed  - 2-3 x daily - 7 x weekly - 1-2 sets - 10 reps  - Sit to Stand with Counter Support  - 2-3 " x daily - 7 x weekly - 1-2 sets - 10 reps  - Seated Long Arc Quad  - 2-3 x daily - 7 x weekly - 1-2 sets - 10 reps    ASSESSMENT:   Able to resume  balance exercises this date without increasing lumbar sx's. Left knee flexion mobility continues to gradually increase . Continue 1 time a week  for 2 more sessions progressing toward goals  Post session pain:   0/10    PLAN:  Next visit: Plan to resume prior strengthening and balance exercises  Pt has four visits left in authorization period; she would like to schedule 1x/week for 4 weeks  This patient's care will be transferred to [facility] since I will be transferring locations; pt is aware of this    OP PT PLAN:  Treatment/Interventions: Cryotherapy , Gait training , Manual Therapy  , Neuromuscular re-education , Therapeutic activities , and Therapeutic exercise    PT Plan: Skilled PT   PT Frequency: 2 times per week   Duration: 6 weeks  Certification Period Start Date: 4/3/24  Certification Period End Date: 7/1/24  Visits Approved: 11  Rehab Potential: Good  Plan of Care Agreement: Patient         Goals:   Pt will report 1/10 pain at worst with prolonged walking and standing; PROGRESSING  Pt will independent with all ADL's and household chores to resume living alone without live-in family support; ACHIEVED  Left knee AROM will be 0-120 or greater to achieve maximal ROM outcomes  Left knee strength will be strong and painfree; PROGRESSING  TUG without device will be < 13 sec  Sit to stand 30 sec (without UE) >4 reps -ACHIEVED  WOMAC < 5%

## 2024-06-13 DIAGNOSIS — I10 HYPERTENSION, UNSPECIFIED TYPE: ICD-10-CM

## 2024-06-13 RX ORDER — HYDROCHLOROTHIAZIDE 12.5 MG/1
12.5 TABLET ORAL DAILY
Qty: 90 TABLET | Refills: 0 | Status: SHIPPED | OUTPATIENT
Start: 2024-06-13

## 2024-06-15 ENCOUNTER — HOSPITAL ENCOUNTER (OUTPATIENT)
Dept: RADIOLOGY | Facility: CLINIC | Age: 84
Discharge: HOME | End: 2024-06-15
Payer: MEDICARE

## 2024-06-15 VITALS — WEIGHT: 160 LBS | BODY MASS INDEX: 26.66 KG/M2 | HEIGHT: 65 IN

## 2024-06-15 DIAGNOSIS — Z12.31 ENCOUNTER FOR SCREENING MAMMOGRAM FOR MALIGNANT NEOPLASM OF BREAST: ICD-10-CM

## 2024-06-15 PROCEDURE — 77067 SCR MAMMO BI INCL CAD: CPT | Mod: RIGHT SIDE | Performed by: RADIOLOGY

## 2024-06-15 PROCEDURE — 77063 BREAST TOMOSYNTHESIS BI: CPT | Mod: RIGHT SIDE | Performed by: RADIOLOGY

## 2024-06-15 PROCEDURE — 77067 SCR MAMMO BI INCL CAD: CPT | Mod: RT

## 2024-06-18 ENCOUNTER — TREATMENT (OUTPATIENT)
Dept: PHYSICAL THERAPY | Facility: CLINIC | Age: 84
End: 2024-06-18
Payer: MEDICARE

## 2024-06-18 DIAGNOSIS — M17.12 UNILATERAL PRIMARY OSTEOARTHRITIS, LEFT KNEE: ICD-10-CM

## 2024-06-18 DIAGNOSIS — Z96.652 STATUS POST TOTAL KNEE REPLACEMENT, LEFT: ICD-10-CM

## 2024-06-18 PROCEDURE — 97110 THERAPEUTIC EXERCISES: CPT | Mod: GP | Performed by: PHYSICAL THERAPIST

## 2024-06-18 NOTE — PROGRESS NOTES
"Physical Therapy    Physical Therapy Treatment / Discharge Summary    Patient Name: Flower Lucas  MRN: [id number]  Today's Date: 2024    Time Calculation  Start Time: 847  Stop Time: 928  Time Calculation (min): 41 min  DOS 3/6/24 about 8+weeks left   Visit #: 12 out of 12 (EVAL + 11 approved through 4.3.24 to 24)  Evaluation date: 24    Current Problem:   1. Unilateral primary osteoarthritis, left knee  Follow Up In Physical Therapy      2. Status post total knee replacement, left  Follow Up In Physical Therapy         SUBJECTIVE:   Patient denies any recent pain in the L knee. Reports recent intermittent pinching pain in L lateral hip and in the back; has history of L ISMA. Expresses readiness for discharge to independent HEP without desire to request further authorization for treatment at this time. She reports independence with ADLs. She has been going up/down stairs to basement for laundry, utilizing laundry chute and placing the basket on the steps one at a time while ascending. She was able to drive several hours to [place] a few weeks ago without issue.     Precautions: s/p L TKA, history of R TKA, L ISMA, and L shoulder fracture     Pain:  Start of session: 1-2/10 L hip    OBJECTIVE:    24  L knee ROM: 0-110 (114 at previous visit)  L knee MMT: EXT 4+/5 painfree, FLEX 4+/5 painfree  L hip MMT: FLEX 4/5 painfree, ER 4/5 painfree  IR 4/5 painfree, ABD 4/5 painfree  TU.98 sec without assistive device  Sit to stand: independent without UE support, symmetrical LE WB  Stairs: reciprocal with unilateral UE support of rail  Gait: independent without assistive device, absence of antalgia    Treatments:  Therapeutic Exercise: (41 minutes)  Nustep L3 (seat 9) x 10 min, LE only  Reassessment (ROM, MMT, TUG see objective)  L modified judy stretch with strap 2x30\"  Update and review of HEP with handouts provided     HEP:  Access Code: [email or web address]  URL: [email or web address]  Date: " 05/28/2024  Prepared by: Sanna Mane    Exercises  - Seated Bilateral Shoulder Flexion Towel Slide at Table Top  - 2-3 x daily - 7 x weekly - 1-2 sets - 10 reps - 5 hold  - Seated Lumbar Extension  - 2-3 x daily - 7 x weekly - 1-2 sets - 10 reps  - Seated Trunk Rotation - Arms Crossed  - 2-3 x daily - 7 x weekly - 1-2 sets - 10 reps  - Sit to Stand with Counter Support  - 2-3 x daily - 7 x weekly - 1-2 sets - 10 reps  - Seated Long Arc Quad  - 2-3 x daily - 7 x weekly - 1-2 sets - 10 reps    ASSESSMENT:   Patient has complete 12/12 authorized PT visits demonstrating improvements in L knee ROM, gross L LE strength, ambulation, and functional mobility. She continues to lack end range flexion for which she demonstrates independence in HEP to continue to work towards. The L knee has been non-painful as of lately, however, pt reports some L hip/back pain for which she was instructed to follow up with physician regarding and obtain referral for PT to address as warranted.     Post session pain: 0/10    PLAN:  Discharge to independent HEP    OP PT PLAN:  Treatment/Interventions: Cryotherapy , Gait training , Manual Therapy  , Neuromuscular re-education , Therapeutic activities , and Therapeutic exercise    PT Plan: Skilled PT   PT Frequency: 2 times per week   Duration: 6 weeks  Certification Period Start Date: 4/3/24  Certification Period End Date: 7/1/24  Visits Approved: 11  Rehab Potential: Good  Plan of Care Agreement: Patient         Goals:   1) Pt will report 1/10 pain at worst with prolonged walking and standing; ACHIEVED  2) Pt will independent with all ADL's and household chores to resume living alone without live-in family support; ACHIEVED  3) Left knee AROM will be 0-120 or greater to achieve maximal ROM outcomes PROGRESSED TOWARDS BUT NOT ACHIEVED (improved from 3-104 to 0-114; independent in HEP to continue to address flexion ROM deficit)  4) Left knee strength will be strong and painfree; ACHIEVED  5) TUG  without device will be < 13 sec  -ACHIEVED  6) Sit to stand 30 sec (without UE) >4 reps -ACHIEVED  7) WOMAC < 5% --PROGRESSED TOWARDS BUT NOT ACHIEVED (improved from 12.5% to 9.4%)

## 2024-06-25 ENCOUNTER — APPOINTMENT (OUTPATIENT)
Dept: PHYSICAL THERAPY | Facility: CLINIC | Age: 84
End: 2024-06-25
Payer: MEDICARE

## 2024-07-01 DIAGNOSIS — I10 HYPERTENSION, UNSPECIFIED TYPE: ICD-10-CM

## 2024-07-01 DIAGNOSIS — E03.9 HYPOTHYROIDISM, UNSPECIFIED TYPE: ICD-10-CM

## 2024-07-04 RX ORDER — AMLODIPINE BESYLATE 5 MG/1
5 TABLET ORAL DAILY
Qty: 90 TABLET | Refills: 0 | Status: SHIPPED | OUTPATIENT
Start: 2024-07-04

## 2024-08-19 DIAGNOSIS — E03.9 HYPOTHYROIDISM, UNSPECIFIED TYPE: ICD-10-CM

## 2024-08-19 DIAGNOSIS — I10 HYPERTENSION, UNSPECIFIED TYPE: ICD-10-CM

## 2024-08-19 RX ORDER — AMLODIPINE BESYLATE 5 MG/1
5 TABLET ORAL DAILY
Qty: 90 TABLET | Refills: 0 | Status: SHIPPED | OUTPATIENT
Start: 2024-08-19

## 2024-08-26 DIAGNOSIS — E03.9 HYPOTHYROIDISM, UNSPECIFIED TYPE: ICD-10-CM

## 2024-08-26 DIAGNOSIS — I10 HYPERTENSION, UNSPECIFIED TYPE: ICD-10-CM

## 2024-08-26 RX ORDER — LEVOTHYROXINE SODIUM 112 UG/1
112 TABLET ORAL DAILY
Qty: 90 TABLET | Refills: 1 | Status: SHIPPED | OUTPATIENT
Start: 2024-08-26

## 2024-08-26 RX ORDER — AMLODIPINE BESYLATE 5 MG/1
5 TABLET ORAL DAILY
Qty: 90 TABLET | Refills: 0 | Status: SHIPPED | OUTPATIENT
Start: 2024-08-26

## 2024-08-27 ENCOUNTER — APPOINTMENT (OUTPATIENT)
Dept: INFUSION THERAPY | Facility: CLINIC | Age: 84
End: 2024-08-27
Payer: MEDICARE

## 2024-08-28 ENCOUNTER — LAB (OUTPATIENT)
Dept: LAB | Facility: LAB | Age: 84
End: 2024-08-28
Payer: MEDICARE

## 2024-08-28 DIAGNOSIS — M81.0 OSTEOPOROSIS, UNSPECIFIED OSTEOPOROSIS TYPE, UNSPECIFIED PATHOLOGICAL FRACTURE PRESENCE: ICD-10-CM

## 2024-08-28 PROCEDURE — 80053 COMPREHEN METABOLIC PANEL: CPT

## 2024-08-28 PROCEDURE — 36415 COLL VENOUS BLD VENIPUNCTURE: CPT

## 2024-08-29 LAB
ALBUMIN SERPL BCP-MCNC: 4.6 G/DL (ref 3.4–5)
ALP SERPL-CCNC: 78 U/L (ref 33–136)
ALT SERPL W P-5'-P-CCNC: 19 U/L (ref 7–45)
ANION GAP SERPL CALC-SCNC: 14 MMOL/L (ref 10–20)
AST SERPL W P-5'-P-CCNC: 21 U/L (ref 9–39)
BILIRUB SERPL-MCNC: 0.5 MG/DL (ref 0–1.2)
BUN SERPL-MCNC: 21 MG/DL (ref 6–23)
CALCIUM SERPL-MCNC: 10.2 MG/DL (ref 8.6–10.6)
CHLORIDE SERPL-SCNC: 102 MMOL/L (ref 98–107)
CO2 SERPL-SCNC: 27 MMOL/L (ref 21–32)
CREAT SERPL-MCNC: 0.81 MG/DL (ref 0.5–1.05)
EGFRCR SERPLBLD CKD-EPI 2021: 72 ML/MIN/1.73M*2
GLUCOSE SERPL-MCNC: 107 MG/DL (ref 74–99)
POTASSIUM SERPL-SCNC: 5.2 MMOL/L (ref 3.5–5.3)
PROT SERPL-MCNC: 7.1 G/DL (ref 6.4–8.2)
SODIUM SERPL-SCNC: 138 MMOL/L (ref 136–145)

## 2024-08-30 ENCOUNTER — INFUSION (OUTPATIENT)
Dept: INFUSION THERAPY | Facility: CLINIC | Age: 84
End: 2024-08-30
Payer: MEDICARE

## 2024-08-30 VITALS
DIASTOLIC BLOOD PRESSURE: 85 MMHG | HEART RATE: 68 BPM | RESPIRATION RATE: 18 BRPM | OXYGEN SATURATION: 96 % | SYSTOLIC BLOOD PRESSURE: 158 MMHG | TEMPERATURE: 98.6 F

## 2024-08-30 DIAGNOSIS — M81.0 OSTEOPOROSIS, UNSPECIFIED OSTEOPOROSIS TYPE, UNSPECIFIED PATHOLOGICAL FRACTURE PRESENCE: ICD-10-CM

## 2024-08-30 PROCEDURE — 2500000004 HC RX 250 GENERAL PHARMACY W/ HCPCS (ALT 636 FOR OP/ED): Mod: JZ | Performed by: INTERNAL MEDICINE

## 2024-08-30 PROCEDURE — 96372 THER/PROPH/DIAG INJ SC/IM: CPT | Performed by: INTERNAL MEDICINE

## 2024-08-30 RX ORDER — DIPHENHYDRAMINE HYDROCHLORIDE 50 MG/ML
50 INJECTION INTRAMUSCULAR; INTRAVENOUS AS NEEDED
OUTPATIENT
Start: 2025-02-21

## 2024-08-30 RX ORDER — ALBUTEROL SULFATE 0.83 MG/ML
3 SOLUTION RESPIRATORY (INHALATION) AS NEEDED
OUTPATIENT
Start: 2025-02-21

## 2024-08-30 RX ORDER — FAMOTIDINE 10 MG/ML
20 INJECTION INTRAVENOUS ONCE AS NEEDED
OUTPATIENT
Start: 2025-02-21

## 2024-08-30 RX ORDER — EPINEPHRINE 0.3 MG/.3ML
0.3 INJECTION SUBCUTANEOUS EVERY 5 MIN PRN
OUTPATIENT
Start: 2025-02-21

## 2024-08-30 NOTE — PROGRESS NOTES
[facility]   Infusion Clinic Note   Date: 2024   Name: Flower Lucas  : 1940   MRN: [id number]          Reason for Visit: No chief complaint on file.         Today: We administered denosumab.                                                  Visit Type: INJECTION       Ordered By: Mauro       Accompanied by:Self       Diagnosis: Osteoporosis, unspecified osteoporosis type, unspecified pathological fracture presence        Allergies:   Allergies as of 2024    (No Known Allergies)          Current Medications has a current medication list which includes the following prescription(s): acetaminophen, amlodipine, ascorbic acid, aspirin, carisoprodol, prolia, docusate sodium, escitalopram, hydrochlorothiazide, lamotrigine, levothyroxine, metoprolol succinate xl, oxybutynin, and zonisamide.       Vitals:   There were no vitals filed for this visit.          Infusion Pre-procedure Checklist:   - Allergies reviewed: yes   - Medications reviewed: yes       - Previous reaction to current treatment: no      Assess patient for the concerns below. Document provider notification as appropriate.  - Active or recent infection with/without current antibiotic use: no  - Recent or planned invasive dental work: no  - Recent or planned surgeries: no  - Recently received or plans to receive vaccinations: no  - Has treatment related toxicities: no  - Is pregnant:  no      Pain: 0   - Is the pain different from normal: no   - Is your Doctor aware:  no       Labs: N/A          Fall Risk Screening:         Review Of Systems:  Review of Systems   All other systems reviewed and are negative.        ROS completed? Yes      Infusion Readiness:  - Assessment Concerns Related to Infusion: No  - Provider notified: no      Document Below Only If Indicated:   New Patient Education:    N/A (returning patient for continuation of therapy. Ongoing education provided as needed.)        Treatment Conditions &  Drug Specific Questions:    Denosumab  (PROLIA. XGEVA)    (Unless otherwise specified on patient specific therapy plan):     TREATMENT CONDITIONS:  Unless otherwise specified on patient specific therapy plan HOLD and notify provider prior to proceeding with today's injection if patients:  o Calcium is LESS THAN 8.6 mg/dL OR  Ionized Calcium LESS THAN 1.1 mmol/L  o Recent or planned invasive dental procedure (within 4 weeks)    Lab Results   Component Value Date    CALCIUM 10.2 08/28/2024    PHOS 3.3 07/19/2023      Lab Results   Component Value Date    CAION 1.24 02/22/2024       Labs reviewed and patient meets treatment conditions? Yes    DRUG SPECIFIC QUESTIONS:  Is the patient taking calcium and vitamin D? Yes  (Recommended)    Pt Instructed on following risks: (1) hypocalcemia, (2) osteonecrosis of the jaw, (3) atypical femoral fractures, (4) serious infections, and (5) dermatologic reactions?  Yes      REMINDER:  PREGNANCY CATEGORY X DRUG. OBTAIN NEGTATIVE PREGNANCY TEST PRIOR TO FIRST INFUSION FOR WOMEN OF CHILDBEARING ABILITY   REMS DRUG    Recommended Vitals/Observation:  Vitals: Obtain vitals prior to injection.  Observation: Patient may leave immediately following injection.        Weight Based Drug Calculations:    WEIGHT BASED DRUGS: NOT APPLICABLE / FLAT DOSE          Initiated By: Yris Florez RN   Patient visit conducted today by Yris Florez RN for administration of Prolia Subcutaneous injection administered in right arm(s) and well-tolerated.

## 2024-08-30 NOTE — PATIENT INSTRUCTIONS
Today :Flower Lucas had no medications administered during this visit.     For:   1. Osteoporosis, unspecified osteoporosis type, unspecified pathological fracture presence         Your next appointment is due in:  2/21/24        Please read the  Medication Guide that was given to you and reviewed during todays visit.     (Tell all doctors including dentists that you are taking this medication)     Go to the emergency room or call 911 if:  -You have signs of allergic reaction:   -Rash, hives, itching.   -Swollen, blistered, peeling skin.   -Swelling of face, lips, mouth, tongue or throat.   -Tightness of chest, trouble breathing, swallowing or talking     Call your doctor:  - If IV / injection site gets red, warm, swollen, itchy or leaks fluid or pus.     (Leave dressing on your IV site for at least 2 hours and keep area clean and dry  - If you get sick or have symptoms of infection or are not feeling well for any reason.    (Wash your hands often, stay away from people who are sick)  - If you have side effects from your medication that do not go away or are bothersome.     (Refer to the teaching your nurse gave you for side effects to call your doctor about)    - Common side effects may include:  stuffy nose, headache, feeling tired, muscle aches, upset stomach  - Before receiving any vaccines     - Call [facility] at   If:  - You get sick, are on antibiotics, have had a recent vaccine, have surgery or dental work and your doctor wants your visit rescheduled.  - You need to cancel and reschedule your visit for any reason. Call at least 2 days before your visit if you need to cancel.   - Your insurance changes before your next visit.    (We will need to get approval from your new insurance. This can take up to two weeks.)     The [facility] is opened Monday thru Friday. We are closed on weekends and holidays.   Voice mail will take your call if the center is closed. If you  leave a message please allow 24 hours for a call back during weekdays. If you leave a message on a weekend/holiday, we will call you back the next business day.

## 2024-10-08 ENCOUNTER — OFFICE VISIT (OUTPATIENT)
Dept: RHEUMATOLOGY | Facility: CLINIC | Age: 84
End: 2024-10-08
Payer: MEDICARE

## 2024-10-08 VITALS — BODY MASS INDEX: 27.29 KG/M2 | SYSTOLIC BLOOD PRESSURE: 110 MMHG | WEIGHT: 164 LBS | DIASTOLIC BLOOD PRESSURE: 62 MMHG

## 2024-10-08 DIAGNOSIS — M81.0 OSTEOPOROSIS, UNSPECIFIED OSTEOPOROSIS TYPE, UNSPECIFIED PATHOLOGICAL FRACTURE PRESENCE: Primary | ICD-10-CM

## 2024-10-08 DIAGNOSIS — M75.101 ROTATOR CUFF SYNDROME OF RIGHT SHOULDER: ICD-10-CM

## 2024-10-08 PROCEDURE — 1159F MED LIST DOCD IN RCRD: CPT | Performed by: INTERNAL MEDICINE

## 2024-10-08 PROCEDURE — 99213 OFFICE O/P EST LOW 20 MIN: CPT | Performed by: INTERNAL MEDICINE

## 2024-10-08 PROCEDURE — 20610 DRAIN/INJ JOINT/BURSA W/O US: CPT | Mod: RT | Performed by: INTERNAL MEDICINE

## 2024-10-08 PROCEDURE — 3074F SYST BP LT 130 MM HG: CPT | Performed by: INTERNAL MEDICINE

## 2024-10-08 PROCEDURE — 2500000004 HC RX 250 GENERAL PHARMACY W/ HCPCS (ALT 636 FOR OP/ED): Performed by: INTERNAL MEDICINE

## 2024-10-08 PROCEDURE — 3078F DIAST BP <80 MM HG: CPT | Performed by: INTERNAL MEDICINE

## 2024-10-08 RX ORDER — TRIAMCINOLONE ACETONIDE 40 MG/ML
2.5 INJECTION, SUSPENSION INTRA-ARTICULAR; INTRAMUSCULAR
Status: COMPLETED | OUTPATIENT
Start: 2024-10-08 | End: 2024-10-08

## 2024-10-08 NOTE — PROGRESS NOTES
Recheck  OA  /  OP  ( Prolia - Aug )  doing well overall with exception of right shoulder pain x 5 months.    My MA called pt in the lobby.  Pt didn't answer.  My MA called her again.  Pt was on her phone but did not look up but instead held up a finger and made her wait 2 min before coming back with her.    HPI - She c/o R shoulder pain.  She has has LBP, and her hips are sometimes stiff in AM.  She takes tylenol arthritis 2 tab bid, which helps.  She is active in her house.   She takes calcium daily.  She has some dairy.      PE  NAD  RRR no r/m/g  CTA  No edema  No synovitis  Pain with abd/rot R shoulder  LB and troch NT    A/P  - OP - on prolia x 2 yrs after evenity x 1 yr.  10/23 DEXA showed improvement.  Continue prolia  Rotator cuff syndrome - R subacr injection - expl risks/benefits.  Pt gave written consent.  Aseptic tech, injected with 40mg kenalog and 1ml 1% lido  Consider PT for back/hip area pain or if shoulder sx continue  Follow up 1 yr or sooner PRN  Patient ID: Flower Lucas is a 83 y.o. female.    Large Joint Injection/Arthrocentesis: R subacromial bursa on 10/8/2024 10:32 AM  Indications: pain  Medications: 2.5 mg triamcinolone acetonide 40 mg/mL  Procedure, treatment alternatives, risks and benefits explained, specific risks discussed. Consent was given by the patient.

## 2024-11-12 PROBLEM — G47.00 INSOMNIA: Status: ACTIVE | Noted: 2024-11-12

## 2024-11-12 PROBLEM — Z86.69 HISTORY OF CATARACT: Status: ACTIVE | Noted: 2024-11-12

## 2024-11-12 PROBLEM — E66.3 OVERWEIGHT WITH BODY MASS INDEX (BMI) 25.0-29.9: Status: ACTIVE | Noted: 2024-11-12

## 2024-11-12 PROBLEM — K63.5 HYPERPLASTIC POLYP OF LARGE INTESTINE: Status: ACTIVE | Noted: 2024-11-12

## 2024-11-12 PROBLEM — F43.20 ADJUSTMENT DISORDER: Status: ACTIVE | Noted: 2024-11-12

## 2024-11-12 PROBLEM — E87.20 LACTIC ACIDOSIS: Status: ACTIVE | Noted: 2024-11-12

## 2024-11-12 PROBLEM — G47.21 SLEEP-WAKE SCHEDULE DISORDER, DELAYED PHASE TYPE: Status: ACTIVE | Noted: 2024-11-12

## 2024-11-12 PROBLEM — H04.19 DISORDER OF LACRIMAL GLAND: Status: ACTIVE | Noted: 2024-11-12

## 2024-11-12 PROBLEM — Z20.822 CONTACT WITH AND (SUSPECTED) EXPOSURE TO COVID-19: Status: ACTIVE | Noted: 2022-09-10

## 2024-11-12 PROBLEM — H02.839 DERMATOCHALASIS: Status: ACTIVE | Noted: 2024-11-12

## 2024-11-12 PROBLEM — R71.8 HIGH HEMATOCRIT: Status: ACTIVE | Noted: 2024-11-12

## 2024-11-12 PROBLEM — R40.1 CLOUDED CONSCIOUSNESS: Status: ACTIVE | Noted: 2024-11-12

## 2024-11-12 PROBLEM — F32.0 MAJOR DEPRESSIVE DISORDER, SINGLE EPISODE, MILD (CMS-HCC): Status: ACTIVE | Noted: 2024-11-12

## 2024-11-12 PROBLEM — E04.1 THYROID NODULE: Status: ACTIVE | Noted: 2022-09-10

## 2024-11-12 PROBLEM — H43.819 POSTERIOR VITREOUS DETACHMENT: Status: ACTIVE | Noted: 2024-11-12

## 2024-11-12 PROBLEM — H26.40 SECONDARY CATARACT: Status: ACTIVE | Noted: 2024-11-12

## 2024-11-12 PROBLEM — R53.83 FATIGUE: Status: ACTIVE | Noted: 2024-11-12

## 2024-11-12 PROBLEM — Z86.79 HISTORY OF HYPERTENSION: Status: ACTIVE | Noted: 2024-11-12

## 2024-11-12 PROBLEM — L72.3 SEBACEOUS CYST: Status: ACTIVE | Noted: 2024-11-12

## 2024-11-12 PROBLEM — Z98.42 HISTORY OF LEFT CATARACT EXTRACTION: Status: ACTIVE | Noted: 2024-11-12

## 2024-11-12 PROBLEM — Z98.41 HISTORY OF RIGHT CATARACT EXTRACTION: Status: ACTIVE | Noted: 2024-11-12

## 2024-11-12 PROBLEM — H04.123 DRY EYES: Status: ACTIVE | Noted: 2024-11-12

## 2024-11-12 RX ORDER — PENICILLIN V POTASSIUM 500 MG/1
TABLET, FILM COATED ORAL
COMMUNITY
Start: 2024-04-18 | End: 2024-11-25 | Stop reason: ALTCHOICE

## 2024-11-12 RX ORDER — GABAPENTIN 300 MG/1
1 CAPSULE ORAL
COMMUNITY
Start: 2024-06-06 | End: 2024-11-25 | Stop reason: ALTCHOICE

## 2024-11-12 RX ORDER — TRIAMCINOLONE ACETONIDE 1 MG/G
CREAM TOPICAL
COMMUNITY
Start: 2024-06-28

## 2024-11-25 ENCOUNTER — OFFICE VISIT (OUTPATIENT)
Dept: PRIMARY CARE | Facility: CLINIC | Age: 84
End: 2024-11-25
Payer: MEDICARE

## 2024-11-25 VITALS
BODY MASS INDEX: 26.99 KG/M2 | SYSTOLIC BLOOD PRESSURE: 124 MMHG | DIASTOLIC BLOOD PRESSURE: 67 MMHG | OXYGEN SATURATION: 98 % | HEART RATE: 69 BPM | WEIGHT: 162 LBS | TEMPERATURE: 97.5 F | HEIGHT: 65 IN

## 2024-11-25 DIAGNOSIS — I10 PRIMARY HYPERTENSION: ICD-10-CM

## 2024-11-25 DIAGNOSIS — Z86.2 HISTORY OF ANEMIA: ICD-10-CM

## 2024-11-25 DIAGNOSIS — E03.9 ACQUIRED HYPOTHYROIDISM: ICD-10-CM

## 2024-11-25 DIAGNOSIS — Z76.89 ENCOUNTER TO ESTABLISH CARE WITH NEW DOCTOR: Primary | ICD-10-CM

## 2024-11-25 DIAGNOSIS — Z12.31 ENCOUNTER FOR SCREENING MAMMOGRAM FOR BREAST CANCER: ICD-10-CM

## 2024-11-25 DIAGNOSIS — E78.2 MIXED HYPERLIPIDEMIA: ICD-10-CM

## 2024-11-25 DIAGNOSIS — R73.03 PRE-DIABETES: ICD-10-CM

## 2024-11-25 DIAGNOSIS — E55.9 VITAMIN D DEFICIENCY: ICD-10-CM

## 2024-11-25 PROCEDURE — 99214 OFFICE O/P EST MOD 30 MIN: CPT | Performed by: INTERNAL MEDICINE

## 2024-11-25 PROCEDURE — 90662 IIV NO PRSV INCREASED AG IM: CPT | Performed by: INTERNAL MEDICINE

## 2024-11-25 PROCEDURE — 3078F DIAST BP <80 MM HG: CPT | Performed by: INTERNAL MEDICINE

## 2024-11-25 PROCEDURE — 1125F AMNT PAIN NOTED PAIN PRSNT: CPT | Performed by: INTERNAL MEDICINE

## 2024-11-25 PROCEDURE — 1159F MED LIST DOCD IN RCRD: CPT | Performed by: INTERNAL MEDICINE

## 2024-11-25 PROCEDURE — 3074F SYST BP LT 130 MM HG: CPT | Performed by: INTERNAL MEDICINE

## 2024-11-25 RX ORDER — AMLODIPINE BESYLATE 5 MG/1
5 TABLET ORAL DAILY
Qty: 90 TABLET | Refills: 2 | Status: SHIPPED | OUTPATIENT
Start: 2024-11-25

## 2024-11-25 RX ORDER — METOPROLOL SUCCINATE 25 MG/1
25 TABLET, EXTENDED RELEASE ORAL DAILY
Qty: 90 TABLET | Refills: 2 | Status: SHIPPED | OUTPATIENT
Start: 2024-11-25

## 2024-11-25 RX ORDER — HYDROCHLOROTHIAZIDE 12.5 MG/1
12.5 TABLET ORAL DAILY
Qty: 90 TABLET | Refills: 2 | Status: SHIPPED | OUTPATIENT
Start: 2024-11-25

## 2024-11-25 RX ORDER — LEVOTHYROXINE SODIUM 112 UG/1
112 TABLET ORAL DAILY
Qty: 90 TABLET | Refills: 1 | Status: SHIPPED | OUTPATIENT
Start: 2024-11-25

## 2024-11-25 ASSESSMENT — PAIN SCALES - GENERAL: PAINLEVEL_OUTOF10: 2

## 2024-11-25 NOTE — PROGRESS NOTES
[facility]: [facility]  PROGRESS NOTE      Flower Lucas is a 84 y.o. female that is presenting today for Establish Care (Hyperlipidemia fu, SOLO pt).    Assessment/Plan   Diagnoses and all orders for this visit:  Encounter to establish care with new doctor     - Reviewed patient's available records, discussed PMH, Current active problems Meds and allergies.  Primary hypertension    Under control with current treatment   Continue the same   Rx E-scripted 90 days x 2  -     levothyroxine (Synthroid, Levoxyl) 112 mcg tablet; Take 1 tablet (112 mcg) by mouth once daily.  -     hydroCHLOROthiazide (Microzide) 12.5 mg tablet; Take 1 tablet (12.5 mg) by mouth once daily.  -     metoprolol succinate XL (Toprol-XL) 25 mg 24 hr tablet; Take 1 tablet (25 mg) by mouth once daily.  -     amLODIPine (Norvasc) 5 mg tablet; Take 1 tablet (5 mg) by mouth once daily.  -     Comprehensive Metabolic Panel; Future  -     Urinalysis with Reflex Microscopic; Future  Acquired hypothyroidism     Under control with current treatment   Continue the same   Rx E-scripted 90 days x 2  -     levothyroxine (Synthroid, Levoxyl) 112 mcg tablet; Take 1 tablet (112 mcg) by mouth once daily.  -     amLODIPine (Norvasc) 5 mg tablet; Take 1 tablet (5 mg) by mouth once daily.  -     TSH with reflex to Free T4 if abnormal; Future  Mixed hyperlipidemia  -     Comprehensive Metabolic Panel; Future  -     Lipid Panel; Future  Pre-diabetes  -     Comprehensive Metabolic Panel; Future  -     Hemoglobin A1C; Future  Vitamin D deficiency  -     Vitamin D 25-Hydroxy,Total (for eval of Vitamin D levels); Future  Encounter for screening mammogram for breast cancer  -     BI mammo bilateral screening tomosynthesis; Future  History of anemia  -     CBC and Auto Differential; Future  Other orders  -     Flu vaccine, trivalent, preservative free, HIGH-DOSE, age 65y+ (Fluzone)  -     Follow Up In Primary Care; Future  Subjective   HPI    -  Flower Lucas 84 y.o. female is here today to establish care ( Loulou/ Igor), Refills       - Patient denies any symptoms or concerns at this time.       - patient denies any adverse reactions to or concerns with his/her meds.       - Problem list and medication reconciliation done today.  - V.S. Stable. No changes at this time.  - Encouraged continued diet and exercise modification.     Review of Systems   All pertinent POSITIVES as noted per HPI.  All other systems have been reviewed and are NEGATIVE and /or Noncontributory to this patient current visit or complaint.     Objective   Vitals:    11/25/24 0850   BP: 124/67   Pulse: 69   Temp: 36.4 °C (97.5 °F)   SpO2: 98%      Body mass index is 26.96 kg/m².  Physical Exam  Vitals and nursing note reviewed.   Constitutional:       Appearance: Normal appearance.   HENT:      Head: Normocephalic and atraumatic.   Neck:      Vascular: No carotid bruit.   Cardiovascular:      Rate and Rhythm: Normal rate and regular rhythm.      Pulses: Normal pulses.      Heart sounds: Normal heart sounds.   Pulmonary:      Effort: Pulmonary effort is normal.      Breath sounds: Normal breath sounds.   Abdominal:      General: Abdomen is flat. Bowel sounds are normal.      Palpations: Abdomen is soft.   Musculoskeletal:         General: No swelling. Normal range of motion.      Cervical back: Neck supple.      Comments: Bilateral V.V. / Spider V.   Lymphadenopathy:      Cervical: No cervical adenopathy.   Skin:     General: Skin is warm and dry.   Neurological:      Mental Status: She is alert.   Psychiatric:         Mood and Affect: Mood normal.       Diagnostic Results   Lab Results   Component Value Date    GLUCOSE 107 (H) 08/28/2024    CALCIUM 10.2 08/28/2024     08/28/2024    K 5.2 08/28/2024    CO2 27 08/28/2024     08/28/2024    BUN 21 08/28/2024    CREATININE 0.81 08/28/2024     Lab Results   Component Value Date    ALT 19 08/28/2024    AST 21 08/28/2024    ALKPHOS  "78 08/28/2024    BILITOT 0.5 08/28/2024     Lab Results   Component Value Date    WBC 13.0 (H) 03/07/2024    HGB 12.4 03/07/2024    HCT 38.9 03/07/2024     (H) 03/07/2024     03/07/2024     Lab Results   Component Value Date    CHOL 170 05/08/2023    CHOL 136 09/11/2022    CHOL 167 04/27/2022     Lab Results   Component Value Date    HDL 65.9 05/08/2023    HDL 51 09/11/2022    HDL 67.9 04/27/2022     Lab Results   Component Value Date    LDLCALC 74 09/11/2022     Lab Results   Component Value Date    TRIG 78 05/08/2023    TRIG 55 09/11/2022    TRIG 76 04/27/2022     No components found for: \"CHOLHDL\"  Lab Results   Component Value Date    HGBA1C 6.0 (H) 11/08/2023     Other labs not included in the list above were reviewed either before or during this encounter.    History    Past Medical History:   Diagnosis Date    Abnormal finding of blood chemistry, unspecified 10/04/2016    Abnormal blood chemistry    Anxiety     Arrhythmia     Arthritis     Breast cancer (Multi)     Broken tooth     left lower    Disorder of bone, unspecified 04/26/2016    Disorder of bone and articular cartilage    Disorder of bone, unspecified 02/11/2021    Humerus lesion, left    Displaced comminuted supracondylar fracture without intercondylar fracture of right humerus, initial encounter for closed fracture 08/13/2021    Closed displaced comminuted supracondylar fracture of right humerus without intercondylar fracture, initial encounter    Encounter for general adult medical examination without abnormal findings 08/03/2020    Medicare annual wellness visit, initial    Epistaxis 04/25/2022    Frequent epistaxis    Flatulence 04/25/2022    Excessive gas    Hypo-osmolality and hyponatremia 09/20/2016    Hyponatremia    Hypothyroidism     Other abnormality of red blood cells 08/13/2021    Elevated hematocrit    Other displaced fracture of upper end of left humerus, sequela 08/13/2021    Other closed displaced fracture of proximal " end of left humerus, sequela    Pain in left hip 08/13/2021    Hip pain, left    Pain in left knee 08/13/2021    Knee pain, left    Pain in unspecified joint 08/13/2021    Pain in joint involving multiple sites    Personal history of diseases of the skin and subcutaneous tissue 02/25/2020    History of sebaceous cyst    Personal history of other diseases of the circulatory system     History of hypertension    Personal history of other diseases of the digestive system 12/08/2015    History of constipation    Personal history of other diseases of the nervous system and sense organs     History of cataract    Personal history of other drug therapy 04/26/2016    History of pneumococcal vaccination    Personal history of other drug therapy 11/01/2021    History of influenza vaccination    Personal history of other drug therapy 10/27/2018    History of influenza vaccination    Personal history of other endocrine, nutritional and metabolic disease 03/08/2018    History of thyroid nodule    Personal history of other mental and behavioral disorders 08/05/2020    History of adjustment disorder    Personal history of other specified conditions 11/30/2020    History of insomnia    Personal history of other specified conditions 12/28/2020    History of fatigue    Polyp of colon 12/08/2015    Hyperplastic colon polyp    Presence of spectacles and contact lenses     Wears glasses    Sebaceous cyst 05/17/2018    Infected sebaceous cyst    Seizure disorder (Multi)     Skin cancer of scalp     Sleep apnea     Unspecified fall, initial encounter 08/13/2021    Accidental fall, initial encounter     Past Surgical History:   Procedure Laterality Date    APPENDECTOMY      ruptured with peritonitis as a child    BREAST BIOPSY Left     MALIGNANT    CATARACT EXTRACTION  09/04/2018    Cataract Surgery    COLONOSCOPY  12/08/2015    Complete Colonoscopy    HIP ARTHROPLASTY Left     MASTECTOMY Left 2004    with  reconstruction    OTHER SURGICAL  HISTORY  2020    Mohs surgery nose    OTHER SURGICAL HISTORY Left     Humerus fracture repair    REVISION TOTAL HIP ARTHROPLASTY Left 2023    TOTAL KNEE ARTHROPLASTY Right     Knee Replacement     Family History   Problem Relation Name Age of Onset    Other (Malignant Neoplasm) Mother      Lung cancer Mother      Other (Laryngeal Cancer) Father      Other (Malignant Neoplasm) Father       Social History     Socioeconomic History    Marital status:      Spouse name: Not on file    Number of children: Not on file    Years of education: Not on file    Highest education level: Not on file   Occupational History    Not on file   Tobacco Use    Smoking status: Former     Current packs/day: 0.00     Types: Cigarettes     Quit date:      Years since quittin.9     Passive exposure: Past    Smokeless tobacco: Never   Vaping Use    Vaping status: Never Used   Substance and Sexual Activity    Alcohol use: Yes     Comment: occasionally    Drug use: Never    Sexual activity: Defer   Other Topics Concern    Not on file   Social History Narrative    Not on file     Social Drivers of Health     Financial Resource Strain: Low Risk  (3/7/2024)    Overall Financial Resource Strain (CARDIA)     Difficulty of Paying Living Expenses: Not hard at all   Food Insecurity: Not on file   Transportation Needs: No Transportation Needs (3/27/2024)    OASIS : Transportation     Lack of Transportation (Medical): No     Lack of Transportation (Non-Medical): No     Patient Unable or Declines to Respond: No   Physical Activity: Not on file   Stress: Not on file   Social Connections: Feeling Socially Integrated (3/27/2024)    OASIS : Social Isolation     Frequency of experiencing loneliness or isolation: Never   Intimate Partner Violence: Not on file   Housing Stability: Low Risk  (3/7/2024)    Housing Stability Vital Sign     Unable to Pay for Housing in the Last Year: No     Number of Places Lived in the Last  Year: 1     Unstable Housing in the Last Year: No     No Known Allergies  Current Outpatient Medications on File Prior to Visit   Medication Sig Dispense Refill    acetaminophen (Tylenol) 325 mg tablet Take by mouth every 6 hours if needed for mild pain (1 - 3).      amLODIPine (Norvasc) 5 mg tablet Take 1 tablet (5 mg) by mouth once daily. 90 tablet 0    ascorbic acid (Vitamin C) 500 mg tablet Take 1 tablet (500 mg) by mouth 2 times a day.      denosumab (Prolia) 60 mg/mL syringe Inject 1 mL (60 mg total) under the skin every 6 months.      escitalopram (Lexapro) 10 mg tablet Take 1 tablet (10 mg) by mouth once daily. 30 tablet 5    hydroCHLOROthiazide (Microzide) 12.5 mg tablet Take 1 tablet (12.5 mg) by mouth once daily. 90 tablet 0    lamoTRIgine (LaMICtal) 100 mg tablet Take 1 tablet (100 mg) by mouth 2 times a day. 180 tablet 0    levothyroxine (Synthroid, Levoxyl) 112 mcg tablet Take 1 tablet (112 mcg) by mouth once daily. 90 tablet 1    metoprolol succinate XL (Toprol-XL) 25 mg 24 hr tablet Take 1 tablet (25 mg) by mouth once daily. 90 tablet 2    penicillin v potassium (Veetid) 500 mg tablet TAKE TWO TABLETS BY MOUTH ONE HOUR PRIOR TO PROCEDURE AND ONE TABLET 6 HOURS AFTER PROCEDURE FOR DENTAL WORK.      psyllium (Metamucil) 3.4 gram packet Take 1 packet by mouth once daily. prn      triamcinolone (Kenalog) 0.1 % cream APPLY CREAM EXTERNALLY TO AFFECTED AREAS ON BACK AS NEEDED TWICE DAILY FOR ITCHING . AVOID FACE .      zonisamide (Zonegran) 100 mg capsule Take 1 capsule twice daily      denosumab (Prolia) 60 mg/mL syringe Inject 1 mL (60 mg) under the skin 1 time for 1 dose. 1 mL 0    [DISCONTINUED] aspirin 325 mg EC tablet Take 1 tablet (325 mg) by mouth 2 times a day.  0    [DISCONTINUED] carisoprodol (Soma) 350 mg tablet Take 1 tablet (350 mg) by mouth 3 times a day as needed for muscle spasms.  0    [DISCONTINUED] docusate sodium (Colace) 100 mg capsule Take 1 capsule (100 mg) by mouth 2 times a day as  needed for constipation.      [DISCONTINUED] gabapentin (Neurontin) 300 mg capsule Take 1 capsule (300 mg) by mouth 3 times a day.      [DISCONTINUED] oxybutynin (Ditropan) 5 mg tablet Take 1 tablet (5 mg) by mouth once daily. 90 tablet 2     No current facility-administered medications on file prior to visit.     Immunization History   Administered Date(s) Administered    COVID-19 mRNA, bivalent, original/Omicron BA.1, Non-US Vaccine Product, [employer] 05/25/2023    Flu vaccine, quadrivalent, high-dose, preservative free, age 65y+ (FLUZONE) 11/01/2021, 10/12/2022, 09/27/2023    Flu vaccine, trivalent, preservative free, HIGH-DOSE, age 65y+ (Fluzone) 09/25/2014, 10/19/2015, 09/28/2017, 10/26/2018, 09/25/2019, 09/18/2020    Influenza, seasonal, injectable 12/18/2012, 01/30/2014    Moderna SARS-CoV-2 Vaccination 02/05/2021, 03/05/2021, 12/07/2021    Novel influenza-H1N1-09, preservative-free 09/27/2011    Pneumococcal conjugate vaccine, 13-valent (PREVNAR 13) 09/28/2017    Pneumococcal polysaccharide vaccine, 23-valent, age 2 years and older (PNEUMOVAX 23) 09/27/2011, 12/18/2012    RSV, 60 Years And Older (AREXVY) 10/11/2023    Tdap vaccine, age 7 year and older (BOOSTRIX, ADACEL) 01/21/2024    Zoster, live 06/01/2016     Patient's medical history was reviewed and updated either before or during this encounter.       Izzy Gallegos MD

## 2025-01-28 DIAGNOSIS — M81.0 OSTEOPOROSIS, UNSPECIFIED OSTEOPOROSIS TYPE, UNSPECIFIED PATHOLOGICAL FRACTURE PRESENCE: Primary | ICD-10-CM

## 2025-02-13 ENCOUNTER — TELEPHONE (OUTPATIENT)
Dept: INFUSION THERAPY | Facility: CLINIC | Age: 85
End: 2025-02-13
Payer: MEDICARE

## 2025-02-13 DIAGNOSIS — M81.0 OSTEOPOROSIS, UNSPECIFIED OSTEOPOROSIS TYPE, UNSPECIFIED PATHOLOGICAL FRACTURE PRESENCE: Primary | ICD-10-CM

## 2025-02-21 ENCOUNTER — DOCUMENTATION (OUTPATIENT)
Dept: INFUSION THERAPY | Facility: CLINIC | Age: 85
End: 2025-02-21

## 2025-02-21 ENCOUNTER — APPOINTMENT (OUTPATIENT)
Dept: INFUSION THERAPY | Facility: CLINIC | Age: 85
End: 2025-02-21
Payer: MEDICARE

## 2025-02-21 VITALS
OXYGEN SATURATION: 98 % | BODY MASS INDEX: 27.82 KG/M2 | SYSTOLIC BLOOD PRESSURE: 125 MMHG | DIASTOLIC BLOOD PRESSURE: 71 MMHG | HEART RATE: 63 BPM | TEMPERATURE: 98 F | WEIGHT: 167.2 LBS | RESPIRATION RATE: 17 BRPM

## 2025-02-21 DIAGNOSIS — M81.0 OSTEOPOROSIS, UNSPECIFIED OSTEOPOROSIS TYPE, UNSPECIFIED PATHOLOGICAL FRACTURE PRESENCE: ICD-10-CM

## 2025-02-21 LAB
25(OH)D3+25(OH)D2 SERPL-MCNC: 43 NG/ML (ref 30–100)
ALBUMIN SERPL-MCNC: 4.6 G/DL (ref 3.6–5.1)
ALP SERPL-CCNC: 85 U/L (ref 37–153)
ALT SERPL-CCNC: 17 U/L (ref 6–29)
ANION GAP SERPL CALCULATED.4IONS-SCNC: 9 MMOL/L (CALC) (ref 7–17)
AST SERPL-CCNC: 18 U/L (ref 10–35)
BASOPHILS # BLD AUTO: 89 CELLS/UL (ref 0–200)
BASOPHILS NFR BLD AUTO: 1 %
BILIRUB SERPL-MCNC: 0.6 MG/DL (ref 0.2–1.2)
BUN SERPL-MCNC: 18 MG/DL (ref 7–25)
CALCIUM SERPL-MCNC: 9.8 MG/DL (ref 8.6–10.4)
CHLORIDE SERPL-SCNC: 105 MMOL/L (ref 98–110)
CHOLEST SERPL-MCNC: 188 MG/DL
CHOLEST/HDLC SERPL: 2.8 (CALC)
CO2 SERPL-SCNC: 27 MMOL/L (ref 20–32)
CREAT SERPL-MCNC: 0.72 MG/DL (ref 0.6–0.95)
EGFRCR SERPLBLD CKD-EPI 2021: 82 ML/MIN/1.73M2
EOSINOPHIL # BLD AUTO: 178 CELLS/UL (ref 15–500)
EOSINOPHIL NFR BLD AUTO: 2 %
ERYTHROCYTE [DISTWIDTH] IN BLOOD BY AUTOMATED COUNT: 13.7 % (ref 11–15)
EST. AVERAGE GLUCOSE BLD GHB EST-MCNC: 123 MG/DL
EST. AVERAGE GLUCOSE BLD GHB EST-SCNC: 6.8 MMOL/L
GLUCOSE SERPL-MCNC: 101 MG/DL (ref 65–139)
HBA1C MFR BLD: 5.9 % OF TOTAL HGB
HCT VFR BLD AUTO: 43.9 % (ref 35–45)
HDLC SERPL-MCNC: 67 MG/DL
HGB BLD-MCNC: 14.4 G/DL (ref 11.7–15.5)
LDLC SERPL CALC-MCNC: 100 MG/DL (CALC)
LYMPHOCYTES # BLD AUTO: 1175 CELLS/UL (ref 850–3900)
LYMPHOCYTES NFR BLD AUTO: 13.2 %
MCH RBC QN AUTO: 32.7 PG (ref 27–33)
MCHC RBC AUTO-ENTMCNC: 32.8 G/DL (ref 32–36)
MCV RBC AUTO: 99.5 FL (ref 80–100)
MONOCYTES # BLD AUTO: 828 CELLS/UL (ref 200–950)
MONOCYTES NFR BLD AUTO: 9.3 %
NEUTROPHILS # BLD AUTO: 6631 CELLS/UL (ref 1500–7800)
NEUTROPHILS NFR BLD AUTO: 74.5 %
NONHDLC SERPL-MCNC: 121 MG/DL (CALC)
PLATELET # BLD AUTO: 400 THOUSAND/UL (ref 140–400)
PMV BLD REES-ECKER: 10.5 FL (ref 7.5–12.5)
POTASSIUM SERPL-SCNC: 4.4 MMOL/L (ref 3.5–5.3)
PROT SERPL-MCNC: 7.2 G/DL (ref 6.1–8.1)
RBC # BLD AUTO: 4.41 MILLION/UL (ref 3.8–5.1)
SODIUM SERPL-SCNC: 141 MMOL/L (ref 135–146)
TRIGL SERPL-MCNC: 109 MG/DL
TSH SERPL-ACNC: 1.99 MIU/L (ref 0.4–4.5)
WBC # BLD AUTO: 8.9 THOUSAND/UL (ref 3.8–10.8)

## 2025-02-21 RX ORDER — EPINEPHRINE 0.3 MG/.3ML
0.3 INJECTION SUBCUTANEOUS EVERY 5 MIN PRN
Status: CANCELLED | OUTPATIENT
Start: 2025-02-26

## 2025-02-21 RX ORDER — EPINEPHRINE 0.3 MG/.3ML
0.3 INJECTION SUBCUTANEOUS EVERY 5 MIN PRN
OUTPATIENT
Start: 2025-02-26

## 2025-02-21 RX ORDER — ALBUTEROL SULFATE 0.83 MG/ML
3 SOLUTION RESPIRATORY (INHALATION) AS NEEDED
OUTPATIENT
Start: 2025-02-26

## 2025-02-21 RX ORDER — FAMOTIDINE 10 MG/ML
20 INJECTION, SOLUTION INTRAVENOUS ONCE AS NEEDED
Status: CANCELLED | OUTPATIENT
Start: 2025-02-26

## 2025-02-21 RX ORDER — FAMOTIDINE 10 MG/ML
20 INJECTION, SOLUTION INTRAVENOUS ONCE AS NEEDED
OUTPATIENT
Start: 2025-02-26

## 2025-02-21 RX ORDER — DIPHENHYDRAMINE HYDROCHLORIDE 50 MG/ML
50 INJECTION INTRAMUSCULAR; INTRAVENOUS AS NEEDED
OUTPATIENT
Start: 2025-02-26

## 2025-02-21 RX ORDER — ALBUTEROL SULFATE 0.83 MG/ML
3 SOLUTION RESPIRATORY (INHALATION) AS NEEDED
Status: CANCELLED | OUTPATIENT
Start: 2025-02-26

## 2025-02-21 RX ORDER — DIPHENHYDRAMINE HYDROCHLORIDE 50 MG/ML
50 INJECTION INTRAMUSCULAR; INTRAVENOUS AS NEEDED
Status: CANCELLED | OUTPATIENT
Start: 2025-02-26

## 2025-02-21 ASSESSMENT — ENCOUNTER SYMPTOMS
DIZZINESS: 0
WHEEZING: 0
NUMBNESS: 0
SHORTNESS OF BREATH: 0
PALPITATIONS: 0
EXTREMITY WEAKNESS: 0
LIGHT-HEADEDNESS: 0
COUGH: 0
WOUND: 0
LEG SWELLING: 0

## 2025-02-21 ASSESSMENT — PAIN SCALES - GENERAL: PAINLEVEL_OUTOF10: 0-NO PAIN

## 2025-02-21 NOTE — PROGRESS NOTES
[facility]   Infusion Clinic Note   Date: 2025   Name: Flower Lucas  : 1940   MRN: [id number]         Reason for Visit: Injections (Prolia 60 mg every six months)         Today: We administered denosumab.       Ordered By: Kathy Wade MD       For a Diagnosis of: Osteoporosis, unspecified osteoporosis type, unspecified pathological fracture presence       At today's visit patient accompanied by: Self      Vitals:   Vitals:    25 1023   BP: 125/71   Pulse: 63   Resp: 17   Temp: 36.7 °C (98 °F)   SpO2: 98%   Weight: 75.8 kg (167 lb 3.2 oz)   PainSc: 0-No pain             Pre - Treatment Checklist:      - Previous reaction to current treatment: no      Assess patient for the concerns below. Document provider notification as appropriate.  - Active or recent infection with/without current antibiotic use: no  - Recent or planned invasive dental work: no  - Recent or planned surgeries: no  - Recently received or plans to receive vaccinations: no  - Has treatment related toxicities: no  - Any chance may be pregnant:  n/a      Pain: 0   - Is the pain different from normal: n/a   - Is prescribing Doctor aware:  n/a      Labs: Reviewed       Fall Risk Screening: Lindquist Fall Risk  History of Falling, Immediate or Within 3 Months: Yes  Secondary Diagnosis: Yes  Ambulatory Aid: Crutches/cane/walker  Intravenous Therapy/Heparin Lock: No  Gait/Transferring: Normal/bedrest/immobile  Mental Status: Oriented to own ability  Lindquist Fall Risk Score: 55       Review Of Systems:  Review of Systems   Respiratory:  Negative for cough, shortness of breath and wheezing.    Cardiovascular:  Negative for chest pain, leg swelling and palpitations.   Skin:  Negative for itching, rash and wound.   Neurological:  Negative for dizziness, extremity weakness, light-headedness and numbness.         Infusion Readiness:  - Assessment Concerns Related to Infusion: No  - Provider notified: n/a       New Patient Education:    N/A (returning patient for continuation of therapy. Ongoing education provided as needed.)        Treatment Conditions & Drug Specific Questions:    Denosumab  (PROLIA. XGEVA)    (Unless otherwise specified on patient specific therapy plan):     TREATMENT CONDITIONS:  Unless otherwise specified on patient specific therapy plan HOLD and notify provider prior to proceeding with today's injection if patients:  o Calcium is LESS THAN 8.6 mg/dL OR  Ionized Calcium LESS THAN 1.1 mmol/L  o Recent or planned invasive dental procedure (within 4 weeks)    Lab Results   Component Value Date    CALCIUM 9.8 02/20/2025    PHOS 3.3 07/19/2023      Lab Results   Component Value Date    CAION 1.24 02/22/2024       Patient meets treatment conditions? Yes    DRUG SPECIFIC QUESTIONS:  Is the patient taking calcium and vitamin D? Yes  (Recommended)    Pt Instructed on following risks: (1) hypocalcemia, (2) osteonecrosis of the jaw, (3) atypical femoral fractures, (4) serious infections, and (5) dermatologic reactions?  Yes      REMINDER:  PREGNANCY CATEGORY X DRUG. OBTAIN NEGTATIVE PREGNANCY TEST PRIOR TO FIRST INFUSION FOR WOMEN OF CHILDBEARING ABILITY   REMS DRUG    Recommended Vitals/Observation:  Vitals: Obtain vitals prior to injection.  Observation: Patient may leave immediately following injection.        Weight Based Drug Calculations:    WEIGHT BASED DRUGS: NOT APPLICABLE / FLAT DOSE       Post Treatment: Patient tolerated treatment without issue and was discharged in no apparent distress.      Note Authored / Patient Cared for By: Teresa Kumar RN   _________________________________________________________________________    Note authored and patient cared for by: Teresa Kumar RN  Note/Encounter reviewed by: Esperanza CAMP NP. This provider on site at time of patient injection. Staff to notify this provider of any questions, concerns, abnormals or issues during encounter. No issues reported.  Pt. tolerated without difficulty. Pt. not independently evaluated by this provider during today's encounter

## 2025-02-21 NOTE — PATIENT INSTRUCTIONS
Today :We administered denosumab.     For:   1. Osteoporosis, unspecified osteoporosis type, unspecified pathological fracture presence         Your next appointment is due in:  6 months        Please read the  Medication Guide that was given to you and reviewed during todays visit.     (Tell all doctors including dentists that you are taking this medication)     Go to the emergency room or call 911 if:  -You have signs of allergic reaction:   -Rash, hives, itching.   -Swollen, blistered, peeling skin.   -Swelling of face, lips, mouth, tongue or throat.   -Tightness of chest, trouble breathing, swallowing or talking     Call your doctor:  - If IV / injection site gets red, warm, swollen, itchy or leaks fluid or pus.     (Leave dressing on your IV site for at least 2 hours and keep area clean and dry  - If you get sick or have symptoms of infection or are not feeling well for any reason.    (Wash your hands often, stay away from people who are sick)  - If you have side effects from your medication that do not go away or are bothersome.     (Refer to the teaching your nurse gave you for side effects to call your doctor about)    - Common side effects may include:  stuffy nose, headache, feeling tired, muscle aches, upset stomach  - Before receiving any vaccines     - Call the [facility] at   If:  - You get sick, are on antibiotics, have had a recent vaccine, have surgery or dental work and your doctor wants your visit rescheduled.  - You need to cancel and reschedule your visit for any reason. Call at least 2 days before your visit if you need to cancel.   - Your insurance changes before your next visit.    (We will need to get approval from your new insurance. This can take up to two weeks.)     The [facility] is opened Monday thru Friday. We are closed on weekends and holidays.   Voice mail will take your call if the center is closed. If you leave a message please allow 24 hours for  a call back during weekdays. If you leave a message on a weekend/holiday, we will call you back the next business day.    A pharmacist is available Monday - Friday from 8:30AM to 3:30PM to help answer any questions you may have about your prescriptions(s). Please call pharmacy at:    [facility]: [phone]  [facility]: [phone]  [facility]: [phone]

## 2025-05-28 ENCOUNTER — APPOINTMENT (OUTPATIENT)
Dept: PRIMARY CARE | Facility: CLINIC | Age: 85
End: 2025-05-28
Payer: MEDICARE

## 2025-06-13 ENCOUNTER — TELEPHONE (OUTPATIENT)
Dept: PRIMARY CARE | Facility: CLINIC | Age: 85
End: 2025-06-13
Payer: MEDICARE

## 2025-06-16 ENCOUNTER — HOSPITAL ENCOUNTER (OUTPATIENT)
Dept: RADIOLOGY | Facility: CLINIC | Age: 85
Discharge: HOME | End: 2025-06-16
Payer: MEDICARE

## 2025-06-16 VITALS — BODY MASS INDEX: 27.82 KG/M2 | WEIGHT: 167 LBS | HEIGHT: 65 IN

## 2025-06-16 DIAGNOSIS — Z12.31 ENCOUNTER FOR SCREENING MAMMOGRAM FOR BREAST CANCER: ICD-10-CM

## 2025-06-16 PROCEDURE — 77067 SCR MAMMO BI INCL CAD: CPT | Mod: RIGHT SIDE | Performed by: RADIOLOGY

## 2025-06-16 PROCEDURE — 77061 BREAST TOMOSYNTHESIS UNI: CPT | Mod: RT

## 2025-06-16 PROCEDURE — 77063 BREAST TOMOSYNTHESIS BI: CPT | Mod: RIGHT SIDE | Performed by: RADIOLOGY

## 2025-06-19 DIAGNOSIS — E78.2 MIXED HYPERLIPIDEMIA: Primary | ICD-10-CM

## 2025-06-19 DIAGNOSIS — R73.9 HYPERGLYCEMIA: ICD-10-CM

## 2025-06-25 ENCOUNTER — APPOINTMENT (OUTPATIENT)
Dept: PRIMARY CARE | Facility: CLINIC | Age: 85
End: 2025-06-25
Payer: MEDICARE

## 2025-07-01 ENCOUNTER — APPOINTMENT (OUTPATIENT)
Dept: PRIMARY CARE | Facility: CLINIC | Age: 85
End: 2025-07-01
Payer: MEDICARE

## 2025-07-08 ENCOUNTER — HOSPITAL ENCOUNTER (OUTPATIENT)
Dept: RADIOLOGY | Facility: CLINIC | Age: 85
Discharge: HOME | End: 2025-07-08
Payer: MEDICARE

## 2025-07-08 DIAGNOSIS — H53.2 DIPLOPIA: ICD-10-CM

## 2025-07-08 PROCEDURE — 70551 MRI BRAIN STEM W/O DYE: CPT

## 2025-07-21 PROBLEM — M62.81 MUSCLE WEAKNESS: Status: ACTIVE | Noted: 2024-11-12

## 2025-07-21 RX ORDER — DOCUSATE SODIUM 100 MG/1
100 CAPSULE, LIQUID FILLED ORAL
COMMUNITY
End: 2025-07-29 | Stop reason: ALTCHOICE

## 2025-07-24 DIAGNOSIS — E78.2 MIXED HYPERLIPIDEMIA: ICD-10-CM

## 2025-07-24 DIAGNOSIS — R73.9 HYPERGLYCEMIA: ICD-10-CM

## 2025-07-25 LAB
ALBUMIN SERPL-MCNC: 4.4 G/DL (ref 3.6–5.1)
ALP SERPL-CCNC: 84 U/L (ref 37–153)
ALT SERPL-CCNC: 18 U/L (ref 6–29)
ANION GAP SERPL CALCULATED.4IONS-SCNC: 9 MMOL/L (CALC) (ref 7–17)
APPEARANCE UR: CLEAR
AST SERPL-CCNC: 18 U/L (ref 10–35)
BACTERIA #/AREA URNS HPF: ABNORMAL /HPF
BILIRUB SERPL-MCNC: 0.5 MG/DL (ref 0.2–1.2)
BILIRUB UR QL STRIP: NEGATIVE
BUN SERPL-MCNC: 19 MG/DL (ref 7–25)
CALCIUM SERPL-MCNC: 9.7 MG/DL (ref 8.6–10.4)
CHLORIDE SERPL-SCNC: 106 MMOL/L (ref 98–110)
CHOLEST SERPL-MCNC: 175 MG/DL
CHOLEST/HDLC SERPL: 2.9 (CALC)
CO2 SERPL-SCNC: 26 MMOL/L (ref 20–32)
COLOR UR: YELLOW
CREAT SERPL-MCNC: 0.8 MG/DL (ref 0.6–0.95)
EGFRCR SERPLBLD CKD-EPI 2021: 73 ML/MIN/1.73M2
EST. AVERAGE GLUCOSE BLD GHB EST-MCNC: 126 MG/DL
EST. AVERAGE GLUCOSE BLD GHB EST-SCNC: 7 MMOL/L
GLUCOSE SERPL-MCNC: 148 MG/DL (ref 65–99)
GLUCOSE UR QL STRIP: NEGATIVE
HBA1C MFR BLD: 6 %
HDLC SERPL-MCNC: 61 MG/DL
HGB UR QL STRIP: NEGATIVE
HYALINE CASTS #/AREA URNS LPF: ABNORMAL /LPF
KETONES UR QL STRIP: NEGATIVE
LDLC SERPL CALC-MCNC: 95 MG/DL (CALC)
LEUKOCYTE ESTERASE UR QL STRIP: ABNORMAL
NITRITE UR QL STRIP: POSITIVE
NONHDLC SERPL-MCNC: 114 MG/DL (CALC)
PH UR STRIP: 7 [PH] (ref 5–8)
POTASSIUM SERPL-SCNC: 4.9 MMOL/L (ref 3.5–5.3)
PROT SERPL-MCNC: 6.8 G/DL (ref 6.1–8.1)
PROT UR QL STRIP: NEGATIVE
RBC #/AREA URNS HPF: ABNORMAL /HPF
SERVICE CMNT-IMP: ABNORMAL
SODIUM SERPL-SCNC: 141 MMOL/L (ref 135–146)
SP GR UR STRIP: 1.01 (ref 1–1.03)
SQUAMOUS #/AREA URNS HPF: ABNORMAL /HPF
TRIGL SERPL-MCNC: 91 MG/DL
WBC #/AREA URNS HPF: ABNORMAL /HPF

## 2025-07-29 ENCOUNTER — OFFICE VISIT (OUTPATIENT)
Dept: PRIMARY CARE | Facility: CLINIC | Age: 85
End: 2025-07-29
Payer: MEDICARE

## 2025-07-29 VITALS
HEIGHT: 65 IN | DIASTOLIC BLOOD PRESSURE: 63 MMHG | HEART RATE: 70 BPM | TEMPERATURE: 97.7 F | WEIGHT: 164 LBS | BODY MASS INDEX: 27.32 KG/M2 | SYSTOLIC BLOOD PRESSURE: 122 MMHG | OXYGEN SATURATION: 95 %

## 2025-07-29 DIAGNOSIS — R73.03 PRE-DIABETES: ICD-10-CM

## 2025-07-29 DIAGNOSIS — E03.9 ACQUIRED HYPOTHYROIDISM: ICD-10-CM

## 2025-07-29 DIAGNOSIS — I10 PRIMARY HYPERTENSION: ICD-10-CM

## 2025-07-29 DIAGNOSIS — R56.9 SEIZURE (MULTI): ICD-10-CM

## 2025-07-29 DIAGNOSIS — Z00.00 ENCOUNTER FOR MEDICARE ANNUAL WELLNESS EXAM: Primary | ICD-10-CM

## 2025-07-29 DIAGNOSIS — F43.10 PTSD (POST-TRAUMATIC STRESS DISORDER): ICD-10-CM

## 2025-07-29 PROCEDURE — 1159F MED LIST DOCD IN RCRD: CPT | Performed by: INTERNAL MEDICINE

## 2025-07-29 PROCEDURE — 1126F AMNT PAIN NOTED NONE PRSNT: CPT | Performed by: INTERNAL MEDICINE

## 2025-07-29 PROCEDURE — 99214 OFFICE O/P EST MOD 30 MIN: CPT | Performed by: INTERNAL MEDICINE

## 2025-07-29 PROCEDURE — G2211 COMPLEX E/M VISIT ADD ON: HCPCS | Performed by: INTERNAL MEDICINE

## 2025-07-29 PROCEDURE — 99215 OFFICE O/P EST HI 40 MIN: CPT | Performed by: INTERNAL MEDICINE

## 2025-07-29 PROCEDURE — 3078F DIAST BP <80 MM HG: CPT | Performed by: INTERNAL MEDICINE

## 2025-07-29 PROCEDURE — 3074F SYST BP LT 130 MM HG: CPT | Performed by: INTERNAL MEDICINE

## 2025-07-29 PROCEDURE — G0439 PPPS, SUBSEQ VISIT: HCPCS | Performed by: INTERNAL MEDICINE

## 2025-07-29 RX ORDER — ZONISAMIDE 100 MG/1
100 CAPSULE ORAL 2 TIMES DAILY
Start: 2025-07-29

## 2025-07-29 RX ORDER — HYDROCHLOROTHIAZIDE 12.5 MG/1
12.5 TABLET ORAL DAILY
Qty: 90 TABLET | Refills: 2 | Status: SHIPPED | OUTPATIENT
Start: 2025-07-29

## 2025-07-29 RX ORDER — LAMOTRIGINE 100 MG/1
100 TABLET ORAL 2 TIMES DAILY
Start: 2025-07-29

## 2025-07-29 RX ORDER — ASCORBIC ACID 125 MG
TABLET,CHEWABLE ORAL
COMMUNITY

## 2025-07-29 RX ORDER — AMLODIPINE BESYLATE 5 MG/1
5 TABLET ORAL DAILY
Qty: 90 TABLET | Refills: 2 | Status: SHIPPED | OUTPATIENT
Start: 2025-07-29

## 2025-07-29 RX ORDER — ESCITALOPRAM OXALATE 10 MG/1
10 TABLET ORAL DAILY
Qty: 90 TABLET | Refills: 2 | Status: SHIPPED | OUTPATIENT
Start: 2025-07-29

## 2025-07-29 RX ORDER — LEVOTHYROXINE SODIUM 112 UG/1
112 TABLET ORAL DAILY
Qty: 90 TABLET | Refills: 1 | Status: SHIPPED | OUTPATIENT
Start: 2025-07-29

## 2025-07-29 RX ORDER — BISMUTH SUBSALICYLATE 262 MG
1 TABLET,CHEWABLE ORAL DAILY
COMMUNITY

## 2025-07-29 RX ORDER — METOPROLOL SUCCINATE 25 MG/1
25 TABLET, EXTENDED RELEASE ORAL DAILY
Qty: 90 TABLET | Refills: 2 | Status: SHIPPED | OUTPATIENT
Start: 2025-07-29

## 2025-07-29 ASSESSMENT — PAIN SCALES - GENERAL: PAINLEVEL_OUTOF10: 0-NO PAIN

## 2025-07-29 NOTE — PROGRESS NOTES
[facility]: [facility]  MEDICARE WELLNESS EXAM      Flower Lucas is a 84 y.o. female that is presenting today for Annual Exam.    Assessment/Plan    Diagnoses and all orders for this visit:  Encounter for Medicare annual wellness exam  Seizure (Multi)  -     zonisamide (Zonegran) 100 mg capsule; Take 1 capsule (100 mg) by mouth 2 times a day.  Primary hypertension  -     amLODIPine (Norvasc) 5 mg tablet; Take 1 tablet (5 mg) by mouth once daily.  -     hydroCHLOROthiazide (Microzide) 12.5 mg tablet; Take 1 tablet (12.5 mg) by mouth once daily.  -     levothyroxine (Synthroid, Levoxyl) 112 mcg tablet; Take 1 tablet (112 mcg) by mouth once daily.  -     metoprolol succinate XL (Toprol-XL) 25 mg 24 hr tablet; Take 1 tablet (25 mg) by mouth once daily.  Acquired hypothyroidism  -     amLODIPine (Norvasc) 5 mg tablet; Take 1 tablet (5 mg) by mouth once daily.  -     levothyroxine (Synthroid, Levoxyl) 112 mcg tablet; Take 1 tablet (112 mcg) by mouth once daily.  PTSD (post-traumatic stress disorder)  -     escitalopram (Lexapro) 10 mg tablet; Take 1 tablet (10 mg) by mouth once daily.  -     lamoTRIgine (LaMICtal) 100 mg tablet; Take 1 tablet (100 mg) by mouth 2 times a day.  -     zonisamide (Zonegran) 100 mg capsule; Take 1 capsule (100 mg) by mouth 2 times a day.  Pre-diabetes  Other orders  -     Follow Up In Primary Care  -     Follow Up In Primary Care; Future  ADVANCED CARE PLANNING  Advanced Care Planning was discussed with patient:  The patient does not have an advanced care plan on file. The patient does not have an active surrogate decision-maker on file.  Encouraged the patient to confirm that Living Will and Healthcare Power of  (HCPoA) are accurate and up to date.  Encouraged the patient to confirm that our office be provided a copy of any documentation in the event that anything changes.    ACTIVITIES OF DAILY LIVING  Basic ADLs:  Bathing: Independent, Dressing:  Independent, Toileting: Independent, Transferring: Independent, Continence: Independent, Feeding: Independent.    Instrumental ADLs:  Ability to use phone: Independent, Shopping: Independent, Cooking: Independent, House-keeping: Independent, Laundry: Independent, Transportation: Independent, Medication Management: Independent, Finance Management: Independent.    Subjective   HPI    - Flower Lucas 84 y.o. female is here today for her AWV / Results and refills.       - Patient denies any  symptoms or concerns at this time.       - patient denies any adverse reactions to or concerns with his/her meds.       - Problem list and medication reconciliation done today.  - V.S. Stable. No changes at this time.  - Encouraged continued diet and exercise modification.     Review of Systems  All pertinent POSITIVES as noted per HPI.  All other systems have been reviewed and are NEGATIVE and /or Noncontributory to this patient current visit or complaint.    Objective   Vitals:    07/29/25 1601   BP: 122/63   Pulse: 70   Temp: 36.5 °C (97.7 °F)   SpO2: 95%      Body mass index is 27.29 kg/m².  Physical Exam  Diagnostic Results   Lab Results   Component Value Date    GLUCOSE 148 (H) 07/24/2025    CALCIUM 9.7 07/24/2025     07/24/2025    K 4.9 07/24/2025    CO2 26 07/24/2025     07/24/2025    BUN 19 07/24/2025    CREATININE 0.80 07/24/2025     Lab Results   Component Value Date    ALT 18 07/24/2025    AST 18 07/24/2025    ALKPHOS 84 07/24/2025    BILITOT 0.5 07/24/2025     Lab Results   Component Value Date    WBC 8.9 02/20/2025    HGB 14.4 02/20/2025    HCT 43.9 02/20/2025    MCV 99.5 02/20/2025     02/20/2025     Lab Results   Component Value Date    CHOL 175 07/24/2025    CHOL 188 02/20/2025    CHOL 170 05/08/2023     Lab Results   Component Value Date    HDL 61 07/24/2025    HDL 67 02/20/2025    HDL 65.9 05/08/2023     Lab Results   Component Value Date    LDLCALC 95 07/24/2025    LDLCALC 100 (H) 02/20/2025  "   LDLCALC 74 2022     Lab Results   Component Value Date    TRIG 91 2025    TRIG 109 2025    TRIG 78 2023     No components found for: \"CHOLHDL\"  Lab Results   Component Value Date    HGBA1C 6.0 (H) 2025     Other labs not included in the list above reviewed either before or during this encounter.    History   Medical History[1]  Surgical History[2]  Family History[3]  Social History     Socioeconomic History    Marital status:      Spouse name: Not on file    Number of children: Not on file    Years of education: Not on file    Highest education level: Not on file   Occupational History    Not on file   Tobacco Use    Smoking status: Former     Current packs/day: 0.00     Types: Cigarettes     Quit date:      Years since quittin.6     Passive exposure: Past    Smokeless tobacco: Never   Vaping Use    Vaping status: Never Used   Substance and Sexual Activity    Alcohol use: Yes     Comment: occasionally    Drug use: Never    Sexual activity: Defer   Other Topics Concern    Not on file   Social History Narrative    Not on file     Social Drivers of Health     Financial Resource Strain: Low Risk  (3/7/2024)    Overall Financial Resource Strain (CARDIA)     Difficulty of Paying Living Expenses: Not hard at all   Food Insecurity: Not on file   Transportation Needs: No Transportation Needs (3/27/2024)    OASIS : Transportation     Lack of Transportation (Medical): No     Lack of Transportation (Non-Medical): No     Patient Unable or Declines to Respond: No   Physical Activity: Not on file   Stress: Not on file   Social Connections: Feeling Socially Integrated (3/27/2024)    OASIS : Social Isolation     Frequency of experiencing loneliness or isolation: Never   Intimate Partner Violence: Not on file   Housing Stability: Low Risk  (3/7/2024)    Housing Stability Vital Sign     Unable to Pay for Housing in the Last Year: No     Number of Places Lived in the Last Year: " 1     Unstable Housing in the Last Year: No     Allergies[4]  Medications Ordered Prior to Encounter[5]  Immunization History   Administered Date(s) Administered    COVID-19 mRNA, bivalent, original/Omicron BA.1, Non-US Vaccine Product, NewsFixed 05/25/2023    Flu vaccine, quadrivalent, high-dose, preservative free, age 65y+ (FLUZONE) 11/01/2021, 10/12/2022, 09/27/2023    Flu vaccine, trivalent, preservative free, HIGH-DOSE, age 65y+ (Fluzone) 09/25/2014, 10/19/2015, 09/28/2017, 10/26/2018, 09/25/2019, 09/18/2020, 11/25/2024    Influenza, seasonal, injectable 12/18/2012, 01/30/2014, 10/01/2023    Moderna SARS-CoV-2 Vaccination 02/05/2021, 03/05/2021, 12/07/2021    Novel influenza-H1N1-09, preservative-free 09/27/2011    Pneumococcal conjugate vaccine, 13-valent (PREVNAR 13) 09/28/2017    Pneumococcal polysaccharide vaccine, 23-valent, age 2 years and older (PNEUMOVAX 23) 09/27/2011, 12/18/2012    RSV, 60 Years And Older (AREXVY) 10/11/2023    SARS-CoV-2, Unspecified 06/18/2021    Tdap vaccine, age 7 year and older (BOOSTRIX, ADACEL) 01/21/2024    Zoster, live 06/01/2016     Patient's medical history was reviewed and updated either before or during this encounter.     Izzy Gallegos MD         [1]   Past Medical History:  Diagnosis Date    Abnormal finding of blood chemistry, unspecified 10/04/2016    Abnormal blood chemistry    Anxiety     Arrhythmia     Arthritis     Breast cancer 2004    left    Broken tooth     left lower    Disorder of bone, unspecified 04/26/2016    Disorder of bone and articular cartilage    Disorder of bone, unspecified 02/11/2021    Humerus lesion, left    Displaced comminuted supracondylar fracture without intercondylar fracture of right humerus, initial encounter for closed fracture 08/13/2021    Closed displaced comminuted supracondylar fracture of right humerus without intercondylar fracture, initial encounter    Encounter for general adult medical examination without abnormal  findings 08/03/2020    Medicare annual wellness visit, initial    Epistaxis 04/25/2022    Frequent epistaxis    Flatulence 04/25/2022    Excessive gas    Hypo-osmolality and hyponatremia 09/20/2016    Hyponatremia    Hypothyroidism     Other abnormality of red blood cells 08/13/2021    Elevated hematocrit    Other displaced fracture of upper end of left humerus, sequela 08/13/2021    Other closed displaced fracture of proximal end of left humerus, sequela    Pain in left hip 08/13/2021    Hip pain, left    Pain in left knee 08/13/2021    Knee pain, left    Pain in unspecified joint 08/13/2021    Pain in joint involving multiple sites    Personal history of diseases of the skin and subcutaneous tissue 02/25/2020    History of sebaceous cyst    Personal history of other diseases of the circulatory system     History of hypertension    Personal history of other diseases of the digestive system 12/08/2015    History of constipation    Personal history of other diseases of the nervous system and sense organs     History of cataract    Personal history of other drug therapy 04/26/2016    History of pneumococcal vaccination    Personal history of other drug therapy 11/01/2021    History of influenza vaccination    Personal history of other drug therapy 10/27/2018    History of influenza vaccination    Personal history of other endocrine, nutritional and metabolic disease 03/08/2018    History of thyroid nodule    Personal history of other mental and behavioral disorders 08/05/2020    History of adjustment disorder    Personal history of other specified conditions 11/30/2020    History of insomnia    Personal history of other specified conditions 12/28/2020    History of fatigue    Polyp of colon 12/08/2015    Hyperplastic colon polyp    Presence of spectacles and contact lenses     Wears glasses    Sebaceous cyst 05/17/2018    Infected sebaceous cyst    Seizure disorder (Multi)     Skin cancer of scalp     Sleep apnea      Unspecified fall, initial encounter 08/13/2021    Accidental fall, initial encounter   [2]   Past Surgical History:  Procedure Laterality Date    APPENDECTOMY      ruptured with peritonitis as a child    BREAST BIOPSY Left     MALIGNANT    CATARACT EXTRACTION  09/04/2018    Cataract Surgery    COLONOSCOPY  12/08/2015    Complete Colonoscopy    HIP ARTHROPLASTY Left     MASTECTOMY Left 2004    with  reconstruction    OTHER SURGICAL HISTORY  02/25/2020    Mohs surgery nose    OTHER SURGICAL HISTORY Left     Humerus fracture repair    REVISION TOTAL HIP ARTHROPLASTY Left 07/19/2023    TOTAL KNEE ARTHROPLASTY Right     Knee Replacement   [3]   Family History  Problem Relation Name Age of Onset    Other (Malignant Neoplasm) Mother      Lung cancer Mother      Other (Laryngeal Cancer) Father      Other (Malignant Neoplasm) Father     [4] No Known Allergies  [5]   Current Outpatient Medications on File Prior to Visit   Medication Sig Dispense Refill    acetaminophen (Tylenol) 325 mg tablet Take by mouth every 6 hours if needed for mild pain (1 - 3).      amLODIPine (Norvasc) 5 mg tablet Take 1 tablet (5 mg) by mouth once daily. 90 tablet 2    ascorbic acid (Vitamin C) 500 mg tablet Take 1 tablet (500 mg) by mouth 2 times a day.      biotin 5,000 mcg tablet,chewable Chew and swallow.      calcium carbonate-vitamin D3 600 mg-20 mcg (800 unit) tablet,chewable Chew and swallow.      denosumab (Prolia) 60 mg/mL syringe Inject 1 mL (60 mg total) under the skin every 6 months. 1 mL 1    escitalopram (Lexapro) 10 mg tablet Take 1 tablet (10 mg) by mouth once daily. 30 tablet 5    hydroCHLOROthiazide (Microzide) 12.5 mg tablet Take 1 tablet (12.5 mg) by mouth once daily. 90 tablet 2    lamoTRIgine (LaMICtal) 100 mg tablet Take 1 tablet (100 mg) by mouth 2 times a day. 180 tablet 0    levothyroxine (Synthroid, Levoxyl) 112 mcg tablet Take 1 tablet (112 mcg) by mouth once daily. 90 tablet 1    metoprolol succinate XL (Toprol-XL) 25  mg 24 hr tablet Take 1 tablet (25 mg) by mouth once daily. 90 tablet 2    multivitamin tablet Take 1 tablet by mouth once daily.      psyllium (Metamucil) 3.4 gram packet Take 1 packet by mouth once daily. prn      triamcinolone (Kenalog) 0.1 % cream APPLY CREAM EXTERNALLY TO AFFECTED AREAS ON BACK AS NEEDED TWICE DAILY FOR ITCHING . AVOID FACE .      zonisamide (Zonegran) 100 mg capsule Take 1 capsule twice daily      [DISCONTINUED] denosumab (Prolia) 60 mg/mL syringe Inject 1 mL (60 mg total) under the skin every 6 months.      [DISCONTINUED] docusate sodium (Colace) 100 mg capsule Take 1 capsule (100 mg) by mouth once daily.       No current facility-administered medications on file prior to visit.      EXTERNALLY TO AFFECTED AREAS ON BACK AS NEEDED TWICE DAILY FOR ITCHING . AVOID FACE .      zonisamide (Zonegran) 100 mg capsule Take 1 capsule twice daily      [DISCONTINUED] denosumab (Prolia) 60 mg/mL syringe Inject 1 mL (60 mg total) under the skin every 6 months.      [DISCONTINUED] docusate sodium (Colace) 100 mg capsule Take 1 capsule (100 mg) by mouth once daily.       No current facility-administered medications on file prior to visit.

## 2025-08-13 ENCOUNTER — EVALUATION (OUTPATIENT)
Dept: PHYSICAL THERAPY | Facility: CLINIC | Age: 85
End: 2025-08-13
Payer: MEDICARE

## 2025-08-13 DIAGNOSIS — M54.16 LUMBAR BACK PAIN WITH RADICULOPATHY AFFECTING LEFT LOWER EXTREMITY: Primary | ICD-10-CM

## 2025-08-13 PROCEDURE — 97162 PT EVAL MOD COMPLEX 30 MIN: CPT | Mod: GP | Performed by: PHYSICAL THERAPIST

## 2025-08-19 ENCOUNTER — TREATMENT (OUTPATIENT)
Dept: PHYSICAL THERAPY | Facility: CLINIC | Age: 85
End: 2025-08-19
Payer: MEDICARE

## 2025-08-19 DIAGNOSIS — M54.16 LUMBAR BACK PAIN WITH RADICULOPATHY AFFECTING LEFT LOWER EXTREMITY: ICD-10-CM

## 2025-08-19 PROCEDURE — 97110 THERAPEUTIC EXERCISES: CPT | Mod: GP,CQ

## 2025-08-21 ENCOUNTER — TREATMENT (OUTPATIENT)
Dept: PHYSICAL THERAPY | Facility: CLINIC | Age: 85
End: 2025-08-21
Payer: MEDICARE

## 2025-08-21 DIAGNOSIS — M54.16 LUMBAR BACK PAIN WITH RADICULOPATHY AFFECTING LEFT LOWER EXTREMITY: ICD-10-CM

## 2025-08-21 PROCEDURE — 97110 THERAPEUTIC EXERCISES: CPT | Mod: GP,CQ

## 2025-08-25 ENCOUNTER — INFUSION (OUTPATIENT)
Dept: INFUSION THERAPY | Facility: CLINIC | Age: 85
End: 2025-08-25
Payer: MEDICARE

## 2025-08-25 ENCOUNTER — APPOINTMENT (OUTPATIENT)
Dept: INFUSION THERAPY | Facility: CLINIC | Age: 85
End: 2025-08-25
Payer: MEDICARE

## 2025-08-25 VITALS
DIASTOLIC BLOOD PRESSURE: 68 MMHG | HEART RATE: 69 BPM | SYSTOLIC BLOOD PRESSURE: 127 MMHG | TEMPERATURE: 97.6 F | RESPIRATION RATE: 18 BRPM | OXYGEN SATURATION: 98 %

## 2025-08-25 DIAGNOSIS — M81.0 OSTEOPOROSIS, UNSPECIFIED OSTEOPOROSIS TYPE, UNSPECIFIED PATHOLOGICAL FRACTURE PRESENCE: ICD-10-CM

## 2025-08-25 PROCEDURE — 96372 THER/PROPH/DIAG INJ SC/IM: CPT | Performed by: NURSE PRACTITIONER

## 2025-08-25 RX ORDER — ALBUTEROL SULFATE 0.83 MG/ML
3 SOLUTION RESPIRATORY (INHALATION) AS NEEDED
OUTPATIENT
Start: 2026-02-21

## 2025-08-25 RX ORDER — DIPHENHYDRAMINE HYDROCHLORIDE 50 MG/ML
50 INJECTION, SOLUTION INTRAMUSCULAR; INTRAVENOUS AS NEEDED
OUTPATIENT
Start: 2026-02-21

## 2025-08-25 RX ORDER — FAMOTIDINE 10 MG/ML
20 INJECTION, SOLUTION INTRAVENOUS ONCE AS NEEDED
OUTPATIENT
Start: 2026-02-21

## 2025-08-25 RX ORDER — EPINEPHRINE 0.3 MG/.3ML
0.3 INJECTION SUBCUTANEOUS EVERY 5 MIN PRN
OUTPATIENT
Start: 2026-02-21

## 2025-08-25 ASSESSMENT — ENCOUNTER SYMPTOMS
LEG SWELLING: 0
EXTREMITY WEAKNESS: 0
WHEEZING: 0
COUGH: 1
DIZZINESS: 0
PALPITATIONS: 0
NUMBNESS: 0
LIGHT-HEADEDNESS: 0
WOUND: 0
SHORTNESS OF BREATH: 0

## 2025-08-26 ENCOUNTER — APPOINTMENT (OUTPATIENT)
Dept: PHYSICAL THERAPY | Facility: CLINIC | Age: 85
End: 2025-08-26
Payer: MEDICARE

## 2025-08-28 ENCOUNTER — APPOINTMENT (OUTPATIENT)
Dept: PHYSICAL THERAPY | Facility: CLINIC | Age: 85
End: 2025-08-28
Payer: MEDICARE

## 2025-09-02 ENCOUNTER — TREATMENT (OUTPATIENT)
Dept: PHYSICAL THERAPY | Facility: CLINIC | Age: 85
End: 2025-09-02
Payer: MEDICARE

## 2025-09-02 DIAGNOSIS — M54.16 LUMBAR BACK PAIN WITH RADICULOPATHY AFFECTING LEFT LOWER EXTREMITY: Primary | ICD-10-CM

## 2025-09-02 PROCEDURE — 97110 THERAPEUTIC EXERCISES: CPT | Mod: GP,CQ

## 2025-09-04 ENCOUNTER — TREATMENT (OUTPATIENT)
Dept: PHYSICAL THERAPY | Facility: CLINIC | Age: 85
End: 2025-09-04
Payer: MEDICARE

## 2025-09-04 DIAGNOSIS — M54.16 LUMBAR BACK PAIN WITH RADICULOPATHY AFFECTING LEFT LOWER EXTREMITY: ICD-10-CM

## 2025-09-04 PROCEDURE — 97110 THERAPEUTIC EXERCISES: CPT | Mod: GP,CQ

## 2025-09-29 ENCOUNTER — APPOINTMENT (OUTPATIENT)
Dept: OPHTHALMOLOGY | Facility: CLINIC | Age: 85
End: 2025-09-29
Payer: MEDICARE

## 2025-10-16 ENCOUNTER — APPOINTMENT (OUTPATIENT)
Dept: OPHTHALMOLOGY | Facility: CLINIC | Age: 85
End: 2025-10-16
Payer: MEDICARE

## 2026-03-09 ENCOUNTER — APPOINTMENT (OUTPATIENT)
Dept: INFUSION THERAPY | Facility: CLINIC | Age: 86
End: 2026-03-09
Payer: MEDICARE

## (undated) DEVICE — HEMOSTAT, ARISTA, ABSORBABLE, 3 GRAMS

## (undated) DEVICE — TIP, SUCTION, YANKAUER, FLEXIBLE

## (undated) DEVICE — BLADE, OSCILLATOR 19.5 X 86 X 1.27MM

## (undated) DEVICE — GLOVE, SURGICAL, PROTEXIS NEOPRENE, 8.0, PF, LF

## (undated) DEVICE — SOLUTION, IRRIGATION, SODIUM CHLORIDE 0.9%, 1000 ML, POUR BOTTLE

## (undated) DEVICE — Device

## (undated) DEVICE — DRESSING, GAUZE, SPONGE, KERLIX, SUPER, 6 X 6.75 IN, STERILE 10PK

## (undated) DEVICE — SUTURE, VICRYL, 1, 36 IN, CT-1, UNDYED

## (undated) DEVICE — TRACKING KIT, TM KNEE PROCEDURES, VIZADISC

## (undated) DEVICE — IRRIGATION SYSTEM, WOUND, PULSAVAC PLUS

## (undated) DEVICE — GLOVE, SURGICAL, PROTEXIS NEOPRENE, 8.5, PF, LF

## (undated) DEVICE — CATHETER TRAY, SURESTEP, 14FR, PRECONNECTED DRAIN BAG

## (undated) DEVICE — CUFF, TOURNIQUET, 34 X 4, SNGL PORT/SNGL BLADDER, DISP, LF

## (undated) DEVICE — DRESSING, MEPILEX BORDER, POST-OP AG, 4 X 10 IN

## (undated) DEVICE — SUTURE, CTD, VICRYL, 2-0, UND, BR, CT-2

## (undated) DEVICE — PIN, BONE, 4MM X 140MM, STERILE

## (undated) DEVICE — NEEDLE, SPINAL, 20 G X 3.5 IN, YELLOW HUB

## (undated) DEVICE — SYRINGE, 60 CC, LUER LOCK, MONOJECT, W/O CAP, LF

## (undated) DEVICE — TIBIAL CHECKPOINT, STERILE

## (undated) DEVICE — ADHESIVE, SKIN, LIQUIBAND EXCEED

## (undated) DEVICE — SUTURE, V-LOC, 3-0, 23IN, P-12, 90 ABS

## (undated) DEVICE — PIN, BONE, 4MM X 110MM, STERILE

## (undated) DEVICE — DRAPE PACK, TOTAL KNEE, CUSTOM, GEAUGA

## (undated) DEVICE — KIT, MINOR, DOUBLE BASIN

## (undated) DEVICE — CUFF, TOURNIQUET, 30 X 4, SNGL PORT/SNGL BLADDER, DISP, LF

## (undated) DEVICE — COVER, TABLE, 44 X 75 IN, DISPOSABLE, LF, STERILE